# Patient Record
Sex: FEMALE | Race: BLACK OR AFRICAN AMERICAN | NOT HISPANIC OR LATINO | Employment: OTHER | URBAN - METROPOLITAN AREA
[De-identification: names, ages, dates, MRNs, and addresses within clinical notes are randomized per-mention and may not be internally consistent; named-entity substitution may affect disease eponyms.]

---

## 2017-06-27 PROCEDURE — 99283 EMERGENCY DEPT VISIT LOW MDM: CPT | Mod: ,,, | Performed by: EMERGENCY MEDICINE

## 2017-06-27 PROCEDURE — 99282 EMERGENCY DEPT VISIT SF MDM: CPT

## 2017-06-28 ENCOUNTER — HOSPITAL ENCOUNTER (EMERGENCY)
Facility: HOSPITAL | Age: 62
Discharge: HOME | End: 2017-06-28
Attending: EMERGENCY MEDICINE
Payer: MEDICARE

## 2017-06-28 VITALS
TEMPERATURE: 99 F | BODY MASS INDEX: 29.82 KG/M2 | SYSTOLIC BLOOD PRESSURE: 181 MMHG | HEIGHT: 67 IN | WEIGHT: 190 LBS | RESPIRATION RATE: 16 BRPM | DIASTOLIC BLOOD PRESSURE: 84 MMHG | HEART RATE: 88 BPM

## 2017-06-28 DIAGNOSIS — R51.9 NONINTRACTABLE EPISODIC HEADACHE, UNSPECIFIED HEADACHE TYPE: Primary | ICD-10-CM

## 2017-06-28 NOTE — ED PROVIDER NOTES
"Encounter Date: 6/27/2017    SCRIBE #1 NOTE: I, Bettina Payne, am scribing for, and in the presence of,  Dr. Avelar. I have scribed the following portions of the note - Other sections scribed: ANDREA VARGAS.       History     Chief Complaint   Patient presents with    Headache     Headache and nausea x 2 days     Time seen by provider: 1:06 AM    This is a 61 y.o. female with a hx of DM, HTN, stroke, MI x 2, and hypercholesteremia who presents with complaint of headache and nausea x 2 days. Patient states her head felt "swollen," but is now resolved. She did not take any medication for pain. She reports recording her sugar at time of symptom onset to be 205. She denies vomiting, dysuria, cold symptoms, sinus congestion, and unilateral weakness. She notes being out in the sun yesterday, but no over exertion.       The history is provided by the patient and medical records.     Review of patient's allergies indicates:   Allergen Reactions    Ampicillin     Phenergan [promethazine]      Past Medical History:   Diagnosis Date    Diabetes mellitus     Hypercholesteremia     Hypertension     Miscarriage     Myocardial infarct x2    Stroke      Past Surgical History:   Procedure Laterality Date    CARDIAC SURGERY      cardiac stent placement    CORONARY STENT PLACEMENT      TUBAL LIGATION       Family History   Problem Relation Age of Onset    Heart disease Mother     Early death Mother     Diabetes Maternal Aunt      Social History   Substance Use Topics    Smoking status: Current Every Day Smoker     Packs/day: 0.50     Years: 40.00    Smokeless tobacco: Not on file    Alcohol use Yes      Comment: occasionally     Review of Systems   Constitutional: Negative for fever.   HENT: Negative for congestion, sinus pressure and sore throat.    Respiratory: Negative for cough.    Gastrointestinal: Positive for nausea. Negative for abdominal pain, diarrhea and vomiting.   Genitourinary: Negative for dysuria and " hematuria.   Neurological: Positive for headaches. Negative for weakness.       Physical Exam     Initial Vitals [06/27/17 2359]   BP Pulse Resp Temp SpO2   (!) 181/84 88 16 98.7 °F (37.1 °C) --      MAP       116.33         Physical Exam    Constitutional: She appears well-developed and well-nourished. She is not diaphoretic. No distress.   HENT:   Head: Normocephalic and atraumatic.   Right Ear: External ear normal.   Left Ear: External ear normal.   Mouth/Throat: Oropharynx is clear and moist.   Eyes: Conjunctivae and EOM are normal. Pupils are equal, round, and reactive to light. Right eye exhibits no discharge. Left eye exhibits no discharge. No scleral icterus.   Neck: Normal range of motion. Neck supple. No JVD present.   No meningismus   Cardiovascular: Normal rate, regular rhythm and intact distal pulses. Exam reveals no gallop.    Pulmonary/Chest: Breath sounds normal. No stridor. No respiratory distress. She has no wheezes. She has no rhonchi. She has no rales.   Abdominal: Soft. Bowel sounds are normal. She exhibits no distension. There is no tenderness.   Musculoskeletal: Normal range of motion. She exhibits no edema.   Neurological: She is alert and oriented to person, place, and time. She has normal strength. No sensory deficit.   Skin: Skin is warm and dry. No rash noted. No erythema.   Psychiatric: She has a normal mood and affect.         ED Course   Procedures  Labs Reviewed - No data to display          Medical Decision Making:   History:   Old Medical Records: I decided to obtain old medical records.  Initial Assessment:   Resolved headache.  No neuro complaint, no fevers, no trauma.  C/w prior headaches.  ? Tension type ha, migraine.  Low suspicion for bleed, mass, infection or other life-threatening cause.  D/c home, return for new/worse sxs.  Pt indicates understanding.              Scribe Attestation:   Scribe #1: I performed the above scribed service and the documentation accurately  describes the services I performed. I attest to the accuracy of the note.    Attending Attestation:           Physician Attestation for Scribe:  Physician Attestation Statement for Scribe #1: I, Dr. Avelar, reviewed documentation, as scribed by Bettina Payne in my presence, and it is both accurate and complete.                 ED Course     Clinical Impression:   The encounter diagnosis was Nonintractable episodic headache, unspecified headache type.    Disposition:   Disposition: Discharged  Condition: Stable                        Campbell Avelar MD  06/30/17 6962

## 2018-02-16 ENCOUNTER — HOSPITAL ENCOUNTER (OUTPATIENT)
Facility: HOSPITAL | Age: 63
Discharge: HOME | End: 2018-02-17
Attending: EMERGENCY MEDICINE | Admitting: EMERGENCY MEDICINE
Payer: MEDICARE

## 2018-02-16 DIAGNOSIS — Z86.73 H/O: CVA (CEREBROVASCULAR ACCIDENT): Chronic | ICD-10-CM

## 2018-02-16 DIAGNOSIS — R06.02 SHORTNESS OF BREATH: ICD-10-CM

## 2018-02-16 DIAGNOSIS — I25.2 HISTORY OF MI (MYOCARDIAL INFARCTION): Chronic | ICD-10-CM

## 2018-02-16 DIAGNOSIS — R53.1 WEAKNESS: Primary | ICD-10-CM

## 2018-02-16 DIAGNOSIS — I10 HTN (HYPERTENSION): Chronic | ICD-10-CM

## 2018-02-16 LAB
ALBUMIN SERPL BCP-MCNC: 3.4 G/DL
ALP SERPL-CCNC: 148 U/L
ALT SERPL W/O P-5'-P-CCNC: 14 U/L
ANION GAP SERPL CALC-SCNC: 12 MMOL/L
AST SERPL-CCNC: 15 U/L
BASOPHILS # BLD AUTO: 0.1 K/UL
BASOPHILS NFR BLD: 0.6 %
BILIRUB SERPL-MCNC: 0.5 MG/DL
BILIRUB UR QL STRIP: NEGATIVE
BNP SERPL-MCNC: 38 PG/ML
BUN SERPL-MCNC: 19 MG/DL
CALCIUM SERPL-MCNC: 9.7 MG/DL
CHLORIDE SERPL-SCNC: 104 MMOL/L
CK SERPL-CCNC: 50 U/L
CLARITY UR REFRACT.AUTO: CLEAR
CO2 SERPL-SCNC: 22 MMOL/L
COLOR UR AUTO: YELLOW
CREAT SERPL-MCNC: 0.9 MG/DL
DIFFERENTIAL METHOD: ABNORMAL
EOSINOPHIL # BLD AUTO: 0.2 K/UL
EOSINOPHIL NFR BLD: 0.9 %
ERYTHROCYTE [DISTWIDTH] IN BLOOD BY AUTOMATED COUNT: 14.4 %
EST. GFR  (AFRICAN AMERICAN): >60 ML/MIN/1.73 M^2
EST. GFR  (NON AFRICAN AMERICAN): >60 ML/MIN/1.73 M^2
ESTIMATED AVG GLUCOSE: 174 MG/DL
FLUAV AG SPEC QL IA: NEGATIVE
FLUBV AG SPEC QL IA: NEGATIVE
GLUCOSE SERPL-MCNC: 129 MG/DL
GLUCOSE UR QL STRIP: NEGATIVE
HBA1C MFR BLD HPLC: 7.7 %
HCT VFR BLD AUTO: 46.8 %
HGB BLD-MCNC: 16 G/DL
HGB UR QL STRIP: NEGATIVE
IMM GRANULOCYTES # BLD AUTO: 0.09 K/UL
IMM GRANULOCYTES NFR BLD AUTO: 0.6 %
INR PPP: 2.6
KETONES UR QL STRIP: NEGATIVE
LEUKOCYTE ESTERASE UR QL STRIP: NEGATIVE
LYMPHOCYTES # BLD AUTO: 3.2 K/UL
LYMPHOCYTES NFR BLD: 19.8 %
MAGNESIUM SERPL-MCNC: 1.8 MG/DL
MCH RBC QN AUTO: 31.3 PG
MCHC RBC AUTO-ENTMCNC: 34.2 G/DL
MCV RBC AUTO: 91 FL
MONOCYTES # BLD AUTO: 0.9 K/UL
MONOCYTES NFR BLD: 5.5 %
NEUTROPHILS # BLD AUTO: 11.6 K/UL
NEUTROPHILS NFR BLD: 72.6 %
NITRITE UR QL STRIP: NEGATIVE
NRBC BLD-RTO: 0 /100 WBC
PH UR STRIP: 5 [PH] (ref 5–8)
PHOSPHATE SERPL-MCNC: 3.3 MG/DL
PLATELET # BLD AUTO: 249 K/UL
PMV BLD AUTO: 10.7 FL
POCT GLUCOSE: 131 MG/DL (ref 70–110)
POCT GLUCOSE: 162 MG/DL (ref 70–110)
POCT GLUCOSE: 174 MG/DL (ref 70–110)
POTASSIUM SERPL-SCNC: 3.8 MMOL/L
PROCALCITONIN SERPL IA-MCNC: <0.02 NG/ML
PROT SERPL-MCNC: 7.9 G/DL
PROT UR QL STRIP: NEGATIVE
PROTHROMBIN TIME: 25.5 SEC
RBC # BLD AUTO: 5.12 M/UL
SODIUM SERPL-SCNC: 138 MMOL/L
SP GR UR STRIP: 1.02 (ref 1–1.03)
SPECIMEN SOURCE: NORMAL
TROPONIN I SERPL DL<=0.01 NG/ML-MCNC: 0.01 NG/ML
TSH SERPL DL<=0.005 MIU/L-ACNC: 1.49 UIU/ML
URN SPEC COLLECT METH UR: NORMAL
UROBILINOGEN UR STRIP-ACNC: NEGATIVE EU/DL
VIT B12 SERPL-MCNC: 452 PG/ML
WBC # BLD AUTO: 15.97 K/UL

## 2018-02-16 PROCEDURE — 80053 COMPREHEN METABOLIC PANEL: CPT

## 2018-02-16 PROCEDURE — 83880 ASSAY OF NATRIURETIC PEPTIDE: CPT

## 2018-02-16 PROCEDURE — 82607 VITAMIN B-12: CPT

## 2018-02-16 PROCEDURE — 25000003 PHARM REV CODE 250: Performed by: STUDENT IN AN ORGANIZED HEALTH CARE EDUCATION/TRAINING PROGRAM

## 2018-02-16 PROCEDURE — 87400 INFLUENZA A/B EACH AG IA: CPT

## 2018-02-16 PROCEDURE — 85610 PROTHROMBIN TIME: CPT

## 2018-02-16 PROCEDURE — 63600175 PHARM REV CODE 636 W HCPCS: Performed by: STUDENT IN AN ORGANIZED HEALTH CARE EDUCATION/TRAINING PROGRAM

## 2018-02-16 PROCEDURE — 99284 EMERGENCY DEPT VISIT MOD MDM: CPT | Mod: 25

## 2018-02-16 PROCEDURE — 99285 EMERGENCY DEPT VISIT HI MDM: CPT | Mod: ,,, | Performed by: EMERGENCY MEDICINE

## 2018-02-16 PROCEDURE — 85025 COMPLETE CBC W/AUTO DIFF WBC: CPT

## 2018-02-16 PROCEDURE — 84100 ASSAY OF PHOSPHORUS: CPT

## 2018-02-16 PROCEDURE — 81003 URINALYSIS AUTO W/O SCOPE: CPT

## 2018-02-16 PROCEDURE — 83735 ASSAY OF MAGNESIUM: CPT

## 2018-02-16 PROCEDURE — 25000003 PHARM REV CODE 250: Performed by: HOSPITALIST

## 2018-02-16 PROCEDURE — 84443 ASSAY THYROID STIM HORMONE: CPT

## 2018-02-16 PROCEDURE — 96360 HYDRATION IV INFUSION INIT: CPT

## 2018-02-16 PROCEDURE — 84145 PROCALCITONIN (PCT): CPT

## 2018-02-16 PROCEDURE — 99220 PR INITIAL OBSERVATION CARE,LEVL III: CPT | Mod: GC,,, | Performed by: HOSPITALIST

## 2018-02-16 PROCEDURE — 82550 ASSAY OF CK (CPK): CPT

## 2018-02-16 PROCEDURE — G0378 HOSPITAL OBSERVATION PER HR: HCPCS

## 2018-02-16 PROCEDURE — 83036 HEMOGLOBIN GLYCOSYLATED A1C: CPT

## 2018-02-16 PROCEDURE — 84484 ASSAY OF TROPONIN QUANT: CPT

## 2018-02-16 RX ORDER — NITROGLYCERIN 0.4 MG/1
0.4 TABLET SUBLINGUAL EVERY 5 MIN PRN
Status: DISCONTINUED | OUTPATIENT
Start: 2018-02-16 | End: 2018-02-17 | Stop reason: HOSPADM

## 2018-02-16 RX ORDER — METOPROLOL SUCCINATE 50 MG/1
200 TABLET, EXTENDED RELEASE ORAL DAILY
Status: DISCONTINUED | OUTPATIENT
Start: 2018-02-16 | End: 2018-02-17 | Stop reason: HOSPADM

## 2018-02-16 RX ORDER — IBUPROFEN 200 MG
16 TABLET ORAL
Status: DISCONTINUED | OUTPATIENT
Start: 2018-02-16 | End: 2018-02-17 | Stop reason: HOSPADM

## 2018-02-16 RX ORDER — IBUPROFEN 200 MG
24 TABLET ORAL
Status: DISCONTINUED | OUTPATIENT
Start: 2018-02-16 | End: 2018-02-17 | Stop reason: HOSPADM

## 2018-02-16 RX ORDER — WARFARIN SODIUM 5 MG/1
5 TABLET ORAL
COMMUNITY

## 2018-02-16 RX ORDER — AMLODIPINE AND BENAZEPRIL HYDROCHLORIDE 5; 20 MG/1; MG/1
2 CAPSULE ORAL DAILY
Status: DISCONTINUED | OUTPATIENT
Start: 2018-02-16 | End: 2018-02-17 | Stop reason: HOSPADM

## 2018-02-16 RX ORDER — INSULIN ASPART 100 [IU]/ML
0-5 INJECTION, SOLUTION INTRAVENOUS; SUBCUTANEOUS
Status: DISCONTINUED | OUTPATIENT
Start: 2018-02-16 | End: 2018-02-17 | Stop reason: HOSPADM

## 2018-02-16 RX ORDER — ATORVASTATIN CALCIUM 20 MG/1
80 TABLET, FILM COATED ORAL DAILY
Status: DISCONTINUED | OUTPATIENT
Start: 2018-02-16 | End: 2018-02-17 | Stop reason: HOSPADM

## 2018-02-16 RX ORDER — IBUPROFEN 200 MG
1 TABLET ORAL DAILY
Status: DISCONTINUED | OUTPATIENT
Start: 2018-02-16 | End: 2018-02-17 | Stop reason: HOSPADM

## 2018-02-16 RX ORDER — SODIUM CHLORIDE 0.9 % (FLUSH) 0.9 %
5 SYRINGE (ML) INJECTION
Status: DISCONTINUED | OUTPATIENT
Start: 2018-02-16 | End: 2018-02-17 | Stop reason: HOSPADM

## 2018-02-16 RX ORDER — WARFARIN SODIUM 5 MG/1
5 TABLET ORAL
Status: DISCONTINUED | OUTPATIENT
Start: 2018-02-17 | End: 2018-02-17

## 2018-02-16 RX ORDER — ASPIRIN 81 MG/1
81 TABLET ORAL DAILY
Status: DISCONTINUED | OUTPATIENT
Start: 2018-02-16 | End: 2018-02-17 | Stop reason: HOSPADM

## 2018-02-16 RX ORDER — ONDANSETRON 4 MG/1
4 TABLET, ORALLY DISINTEGRATING ORAL EVERY 8 HOURS PRN
Status: DISCONTINUED | OUTPATIENT
Start: 2018-02-16 | End: 2018-02-17 | Stop reason: HOSPADM

## 2018-02-16 RX ORDER — SODIUM CHLORIDE 9 MG/ML
INJECTION, SOLUTION INTRAVENOUS ONCE
Status: COMPLETED | OUTPATIENT
Start: 2018-02-16 | End: 2018-02-16

## 2018-02-16 RX ORDER — GLUCAGON 1 MG
1 KIT INJECTION
Status: DISCONTINUED | OUTPATIENT
Start: 2018-02-16 | End: 2018-02-17 | Stop reason: HOSPADM

## 2018-02-16 RX ORDER — ACETAMINOPHEN 325 MG/1
650 TABLET ORAL EVERY 8 HOURS PRN
Status: DISCONTINUED | OUTPATIENT
Start: 2018-02-16 | End: 2018-02-17 | Stop reason: HOSPADM

## 2018-02-16 RX ORDER — CHOLECALCIFEROL (VITAMIN D3) 25 MCG
1000 TABLET ORAL DAILY
Status: DISCONTINUED | OUTPATIENT
Start: 2018-02-16 | End: 2018-02-17 | Stop reason: HOSPADM

## 2018-02-16 RX ORDER — INSULIN ASPART 100 [IU]/ML
6 INJECTION, SOLUTION INTRAVENOUS; SUBCUTANEOUS
Status: DISCONTINUED | OUTPATIENT
Start: 2018-02-16 | End: 2018-02-17 | Stop reason: HOSPADM

## 2018-02-16 RX ADMIN — SODIUM CHLORIDE: 0.9 INJECTION, SOLUTION INTRAVENOUS at 06:02

## 2018-02-16 RX ADMIN — VITAMIN D, TAB 1000IU (100/BT) 1000 UNITS: 25 TAB at 12:02

## 2018-02-16 RX ADMIN — SPIRONOLACTONE 25 MG: 25 TABLET, FILM COATED ORAL at 09:02

## 2018-02-16 RX ADMIN — WARFARIN SODIUM 7.5 MG: 2.5 TABLET ORAL at 05:02

## 2018-02-16 RX ADMIN — METOPROLOL SUCCINATE 200 MG: 50 TABLET, EXTENDED RELEASE ORAL at 09:02

## 2018-02-16 RX ADMIN — ATORVASTATIN CALCIUM 80 MG: 20 TABLET, FILM COATED ORAL at 12:02

## 2018-02-16 RX ADMIN — SODIUM CHLORIDE 250 ML: 0.9 INJECTION, SOLUTION INTRAVENOUS at 08:02

## 2018-02-16 RX ADMIN — INSULIN DETEMIR 18 UNITS: 100 INJECTION, SOLUTION SUBCUTANEOUS at 09:02

## 2018-02-16 RX ADMIN — ASPIRIN 81 MG: 81 TABLET, COATED ORAL at 12:02

## 2018-02-16 RX ADMIN — INSULIN ASPART 6 UNITS: 100 INJECTION, SOLUTION INTRAVENOUS; SUBCUTANEOUS at 03:02

## 2018-02-16 RX ADMIN — AMLODIPINE AND BENAZEPRIL HYDROCHLORIDE 2 CAPSULE: 5; 20 CAPSULE ORAL at 02:02

## 2018-02-16 NOTE — PLAN OF CARE
Problem: Patient Care Overview  Goal: Plan of Care Review  Outcome: Ongoing (interventions implemented as appropriate)  Pt's VSS, NAD noted. Pt denies pain at this time. Bed locked in lowest position, side rails upx2, call bell within reach. Nonskid socks present on pt.

## 2018-02-16 NOTE — ED PROVIDER NOTES
Encounter Date: 2/16/2018       History     Chief Complaint   Patient presents with    Fatigue     Pt states that she has been feeling weak and having body aches since Wednesday. Pt states she gets tired when she does a little activity.      Mrs. Mohan is a 59 y/o lady with PMHx STEMI s/p RCA PCI x 2, LV thrombus on coumadin, embolic CVA in 2013, DM, HTN, HLD, who presents today for dyspnea on exertion x 2 days. Patient endorses acute onset dyspnea and lower extremity weakness when walking up stairs in her home. She states she has never experienced this before, and her dyspnea is relived with rest. She states her usually her BG is low when she feels weak, however her fingstick glucose this AM was 115. She denies any recent illnesses, fevers, chills, CP, chest tightness, cough/hemoptysis, orthopnea, PND, n/v/d, hematochezia/melena.          Review of patient's allergies indicates:   Allergen Reactions    Ampicillin     Phenergan [promethazine]      Past Medical History:   Diagnosis Date    Diabetes mellitus     Hypercholesteremia     Hypertension     Miscarriage     Myocardial infarct x2    Stroke      Past Surgical History:   Procedure Laterality Date    CARDIAC SURGERY      cardiac stent placement    CORONARY STENT PLACEMENT      TUBAL LIGATION       Family History   Problem Relation Age of Onset    Heart disease Mother     Early death Mother     Diabetes Maternal Aunt      Social History   Substance Use Topics    Smoking status: Current Every Day Smoker     Packs/day: 0.50     Years: 40.00    Smokeless tobacco: Never Used    Alcohol use Yes      Comment: occasionally     Review of Systems   Constitutional: Negative for chills, diaphoresis and fever.   HENT: Negative for congestion and sore throat.    Eyes: Negative for photophobia and visual disturbance.   Respiratory: Positive for shortness of breath. Negative for cough, chest tightness and wheezing.    Cardiovascular: Negative for chest pain,  palpitations and leg swelling.   Gastrointestinal: Negative for abdominal pain, blood in stool, constipation, diarrhea, nausea and vomiting.   Genitourinary: Negative for dysuria and hematuria.   Musculoskeletal: Negative for arthralgias and myalgias.   Skin: Negative for color change and rash.   Neurological: Positive for weakness. Negative for dizziness, syncope, speech difficulty, light-headedness and headaches.       Physical Exam     Initial Vitals [02/16/18 0602]   BP Pulse Resp Temp SpO2   129/74 108 18 98.8 °F (37.1 °C) 98 %      MAP       92.33         Physical Exam    Vitals reviewed.  Constitutional: She appears well-developed and well-nourished. She is not diaphoretic. No distress.   HENT:   Head: Normocephalic and atraumatic.   Mouth/Throat: Oropharynx is clear and moist.   Eyes: Conjunctivae and EOM are normal. Pupils are equal, round, and reactive to light.   Neck: Neck supple. No JVD present.   Cardiovascular: Regular rhythm, normal heart sounds and intact distal pulses.   No murmur heard.  Tachycardic. 104 BPM   Pulmonary/Chest: Breath sounds normal. No respiratory distress. She has no wheezes. She has no rales.   Abdominal: Soft. Bowel sounds are normal. She exhibits no distension. There is no tenderness.   Musculoskeletal: Normal range of motion. She exhibits no edema.   Neurological: She is alert and oriented to person, place, and time. She has normal strength and normal reflexes.   Skin: Skin is warm and dry. Capillary refill takes less than 2 seconds. No erythema.         ED Course   Procedures  Labs Reviewed   CBC W/ AUTO DIFFERENTIAL - Abnormal; Notable for the following:        Result Value    WBC 15.97 (*)     MCH 31.3 (*)     Immature Granulocytes 0.6 (*)     Gran # (ANC) 11.6 (*)     Immature Grans (Abs) 0.09 (*)     All other components within normal limits   COMPREHENSIVE METABOLIC PANEL - Abnormal; Notable for the following:     CO2 22 (*)     Glucose 129 (*)     Albumin 3.4 (*)      Alkaline Phosphatase 148 (*)     All other components within normal limits   PROTIME-INR - Abnormal; Notable for the following:     Prothrombin Time 25.5 (*)     INR 2.6 (*)     All other components within normal limits   HEMOGLOBIN A1C - Abnormal; Notable for the following:     Hemoglobin A1C 7.7 (*)     Estimated Avg Glucose 174 (*)     All other components within normal limits    Narrative:     Add on order [id number] A1C. Per Dr. Vences  02/16/2018  11:51  add on mg tsh phos and cpk as per dr. vences  02/16/2018  12:27     B-TYPE NATRIURETIC PEPTIDE   TROPONIN I   INFLUENZA A AND B ANTIGEN   URINALYSIS, REFLEX TO URINE CULTURE    Narrative:     Preferred Collection Type->Urine, Clean Catch   PHOSPHORUS   MAGNESIUM   HEMOGLOBIN A1C   CK   TSH   MAGNESIUM    Narrative:     Add on order [id number] A1C. Per Dr. Vences  02/16/2018  11:51  add on mg tsh phos and cpk as per dr. vences  02/16/2018  12:27     PHOSPHORUS    Narrative:     Add on order [id number] A1C. Per Dr. Vences  02/16/2018  11:51  add on mg tsh phos and cpk as per dr. vences  02/16/2018  12:27     TSH    Narrative:     Add on order [id number] A1C. Per Dr. Vences  02/16/2018  11:51  add on mg tsh phos and cpk as per dr. vences  02/16/2018  12:27     CK    Narrative:     Add on order [id number] A1C. Per Dr. Vences  02/16/2018  11:51  add on mg tsh phos and cpk as per dr. vences  02/16/2018  12:27     PROCALCITONIN             Medical Decision Making:   Initial Assessment:   63 y/o F presents with dyspnea on exertion x 2 days.    Differential Diagnosis:   DDx includes but not limited to CHF, PNA, COPD, anemia, URI  Clinical Tests:   Lab Tests: Ordered  Radiological Study: Ordered  Medical Tests: Ordered              Attending Attestation:   Physician Attestation Statement for Resident:  As the supervising MD   Physician Attestation Statement: I have personally seen and examined this patient.   I agree with the above history. -: 63 yo f, extensive  "PMH, including DM, HTN, MI/CAD, CVA, LV thrombus on coumadin, here with multiple complaints, generalized weakness x 3-4 days, this am had left forearm discomfort and paresthesias with mild back pain that felt c/w previous MI.  Also feeling "wobbly" on her legs for several days but without focal weakness.  No HA, no bowel/bladder sx.  + episode nausea this am but none since.  On exam, VSS, pt well-appearing, no focal weakness, LE strength 5/5, gait normal.  EKG unchanged from prior.  Unclear etiology of sx - seem vague, multiple complaints - unclear if they are related?  Considered recurrent ACS given pt's hx.  Do not feel stroke an issue given lack of focal/unilateral sx.  Will likely admit to obs   As the supervising MD I agree with the above PE.    As the supervising MD I agree with the above treatment, course, plan, and disposition.  I have reviewed and agree with the residents interpretation of the following: lab data, x-rays and EKG.  I have reviewed the following: old records at this facility.                       Clinical Impression:   The primary encounter diagnosis was Weakness. A diagnosis of Shortness of breath was also pertinent to this visit.                           Zahraa Vences MD  02/17/18 [id number]    "

## 2018-02-16 NOTE — ASSESSMENT & PLAN NOTE
- Home medications:   Amlodipine- Benazepril 10-40 mg qd   Metoprolol succinate 200 mg qd   Spironolactone 25 mg qd  - Currently BP running ~125-150/ 60-80    Plan:  - Will resume all home medications

## 2018-02-16 NOTE — ED TRIAGE NOTES
Karen Mohan, a 62 y.o. female presents to the ED bed 19      Chief Complaint   Patient presents with    Fatigue     Pt states that she has been feeling weak and having body aches since Wednesday. Pt states she gets tired when she does a little activity.        +SOB with exertion. Denies chest pain, fevers/chills.     Review of patient's allergies indicates:   Allergen Reactions    Ampicillin     Phenergan [promethazine]      Past Medical History:   Diagnosis Date    Diabetes mellitus     Hypercholesteremia     Hypertension     Miscarriage     Myocardial infarct x2    Stroke

## 2018-02-16 NOTE — ASSESSMENT & PLAN NOTE
- Home medications:    Metformin 1000 mg q12   Glimepiride 4mg qd   Insulin glargine 36 units qHS (per patient, but not in MAR)  - Most recent HbA1C.    6/2018: 10.2  - Glucose level currently: 129    Plan:  - will obtain an updated HbA1C  - will hold oral medications  - will start her on Detemir 18 units qHS + Aspart 6 units TIDWM  - will also add LD SSI  - will monitor glucose levels closely and adjust insulin based on requirements

## 2018-02-16 NOTE — ASSESSMENT & PLAN NOTE
- Home medications:   Atorvastatin 80 mg qd  - Most recent lipid panel 2/2016:        LDL 55   HDL 22    Plan:  - Will continue home dose  - Will obtain updated lipid panel

## 2018-02-16 NOTE — ASSESSMENT & PLAN NOTE
Assessment:  - patient with non specific generalized fatigue, lightheadedness and weakness in the legs. Is compliant with all medications. No flu like symptoms, or change in GI or urinary habit  - WBC 15.9  - Hgb 16  - influenza negative  - u/a negative  - CXR within normal limits  - Based on findings so far, symptoms are less likely to be due hypoglycemia, anemia, dehydration, or influenza (however still possible). Other differential diagnosis are electrolyte disturbances, hypothyroidism, orthostatic hypotension, polypharmacy, arrhythmias, occult malignancies and lower on the list myositis, and fibromyalgia.     Plan:  - will obtain orthostatic vital signs  - will obtain TSH, B12, Ck, Mg, Phos  - will keep on cardiac monitoring

## 2018-02-16 NOTE — HPI
The patient is a 63 y/o F with HTN, Dm, HLD, and CAD s/p RCA PCI and stenting x2, cardioembolic CVA due to LV thrombus (on warfarin) with no residual FND,who presented to the ED with generalized fatigue and weakness in the lower extremities.    Patient started to feel not being like her usual 3-4 days ago, she describes it as feeling wobbly and week in her legs, associated with lightheadedness when walking, feeling that she is about to pass out. She mentions palpitation and mild shortness of breath, pain in her left forearm and upper back and nausea as well. She denies headache, dizziness, falling to one side, difficulty in speech, chest pain/tightness, diaphoresis, or any focal weakness or paresthesia. She denies any other GI or urinary symptoms, she has had enough PO intake in the past few days. She hasn't lost any weight recently.

## 2018-02-16 NOTE — SUBJECTIVE & OBJECTIVE
Past Medical History:   Diagnosis Date    Diabetes mellitus     Hypercholesteremia     Hypertension     Miscarriage     Myocardial infarct x2    Stroke        Past Surgical History:   Procedure Laterality Date    CARDIAC SURGERY      cardiac stent placement    CORONARY STENT PLACEMENT      TUBAL LIGATION         Review of patient's allergies indicates:   Allergen Reactions    Ampicillin     Phenergan [promethazine]        No current facility-administered medications on file prior to encounter.      Current Outpatient Prescriptions on File Prior to Encounter   Medication Sig    amlodipine-benazepril (LOTREL) 10-40 mg per capsule Take 1 capsule by mouth once daily.    aspirin (ECOTRIN) 81 MG EC tablet Take 81 mg by mouth once daily.    atorvastatin (LIPITOR) 80 MG tablet Take 1 tablet (80 mg total) by mouth once daily.    blood pressure test kit-large Kit Use as directed    glimepiride (AMARYL) 4 MG tablet Take 4 mg by mouth before breakfast.      metformin (GLUCOPHAGE) 1000 MG tablet Take 1,000 mg by mouth 2 (two) times daily with meals.    metoprolol succinate (TOPROL-XL) 200 MG 24 hr tablet Take 200 mg by mouth once daily.    nitroGLYCERIN (NITROSTAT) 0.4 MG SL tablet Place 1 tablet (0.4 mg total) under the tongue every 5 (five) minutes as needed for Chest pain (Do not take more than three doses at one time. If having chest pain, report to nearest emergency room.).    spironolactone (ALDACTONE) 25 MG tablet Take 25 mg by mouth once daily.    vitamin D 1000 units Tab Take 185 mg by mouth once daily.    warfarin (COUMADIN) 7.5 MG tablet Take 7.5 mg by mouth once daily.     Family History     Problem Relation (Age of Onset)    Diabetes Maternal Aunt    Early death Mother    Heart disease Mother        Social History Main Topics    Smoking status: Current Every Day Smoker     Packs/day: 0.50     Years: 40.00    Smokeless tobacco: Never Used    Alcohol use Yes      Comment: occasionally    Drug  use: No    Sexual activity: Yes     Partners: Male     Birth control/ protection: None     Review of Systems   Constitutional: Positive for activity change and fatigue. Negative for appetite change, chills, fever and unexpected weight change.   HENT: Negative for congestion, ear pain (but feels swollen), rhinorrhea, sneezing, sore throat, tinnitus, trouble swallowing and voice change.         Feels her head is swollen   Eyes: Negative for visual disturbance.   Respiratory: Negative for cough, chest tightness, shortness of breath and wheezing.    Cardiovascular: Positive for palpitations. Negative for chest pain and leg swelling.   Gastrointestinal: Positive for nausea. Negative for abdominal pain, blood in stool, diarrhea and vomiting.   Genitourinary: Negative for dysuria, frequency, hematuria and urgency.   Musculoskeletal: Positive for back pain (upper mid chest). Negative for arthralgias and myalgias.   Skin: Negative for pallor and rash.   Neurological: Positive for weakness and light-headedness. Negative for dizziness, syncope (few episodes of near syncope), speech difficulty, numbness and headaches.   Psychiatric/Behavioral: Negative for agitation and confusion. The patient is nervous/anxious.      Objective:     Vital Signs (Most Recent):  Temp: 98.8 °F (37.1 °C) (02/16/18 0602)  Pulse: 95 (02/16/18 1102)  Resp: 19 (02/16/18 1102)  BP: 130/61 (02/16/18 1102)  SpO2: 97 % (02/16/18 1102) Vital Signs (24h Range):  Temp:  [98.8 °F (37.1 °C)] 98.8 °F (37.1 °C)  Pulse:  [] 95  Resp:  [18-21] 19  SpO2:  [95 %-100 %] 97 %  BP: (123-155)/(60-83) 130/61     Weight: 82.6 kg (182 lb)  Body mass index is 28.51 kg/m².    Physical Exam   Constitutional: She is oriented to person, place, and time. She appears well-developed and well-nourished. She is cooperative. She does not appear ill. No distress.   HENT:   Head: Normocephalic and atraumatic.   Right Ear: Tympanic membrane normal. No drainage or swelling. Tympanic  membrane is not bulging. No middle ear effusion.   Left Ear: Tympanic membrane normal. No drainage or swelling. Tympanic membrane is not bulging.  No middle ear effusion.   Mouth/Throat: Mucous membranes are normal. Mucous membranes are not dry.   Eyes: Conjunctivae and EOM are normal. Pupils are equal, round, and reactive to light. No scleral icterus.   Neck: Normal range of motion. Neck supple. No JVD present. No thyroid mass and no thyromegaly present.   Cardiovascular: Regular rhythm and normal heart sounds.  Tachycardia present.  Exam reveals no gallop.    No murmur heard.  Pulses:       Radial pulses are 2+ on the right side, and 2+ on the left side.        Dorsalis pedis pulses are 2+ on the right side, and 2+ on the left side.   Pulmonary/Chest: Effort normal and breath sounds normal. No accessory muscle usage. No tachypnea. No respiratory distress. She has no decreased breath sounds. She has no wheezes. She has no rales.   Abdominal: Soft. Bowel sounds are normal. She exhibits no distension. There is no tenderness. There is no guarding.   Musculoskeletal: She exhibits no edema or tenderness.   Lymphadenopathy:        Head (right side): No submandibular adenopathy present.        Head (left side): No submandibular adenopathy present.     She has no cervical adenopathy.   Neurological: She is alert and oriented to person, place, and time. She has normal strength. She displays no tremor. No sensory deficit. She displays a negative Romberg sign. Coordination normal. GCS eye subscore is 4. GCS verbal subscore is 5. GCS motor subscore is 6.   Skin: Skin is warm and dry. She is not diaphoretic. No cyanosis or erythema. No pallor.   Psychiatric: Her speech is normal and behavior is normal. Thought content normal. Her mood appears anxious (tearful). Cognition and memory are normal.   Nursing note and vitals reviewed.        CRANIAL NERVES     CN III, IV, VI   Pupils are equal, round, and reactive to  light.  Extraocular motions are normal.   Nystagmus: none     CN V   Facial sensation intact.     CN VII   Facial expression full, symmetric.     CN VIII   CN VIII normal.     CN IX, X   CN IX normal.   CN X normal.     CN XI   CN XI normal.     CN XII   CN XII normal.        Significant Labs:   CBC:   Recent Labs  Lab 02/16/18  0655   WBC 15.97*   HGB 16.0   HCT 46.8        CMP:   Recent Labs  Lab 02/16/18  0655      K 3.8      CO2 22*   *   BUN 19   CREATININE 0.9   CALCIUM 9.7   PROT 7.9   ALBUMIN 3.4*   BILITOT 0.5   ALKPHOS 148*   AST 15   ALT 14   ANIONGAP 12   EGFRNONAA >60.0     Cardiac Markers:   Recent Labs  Lab 02/16/18  0655   BNP 38     Coagulation:   Recent Labs  Lab 02/16/18  0655   INR 2.6*     Troponin:   Recent Labs  Lab 02/16/18  0655   TROPONINI 0.011     Urine Studies:   Recent Labs  Lab 02/16/18  0902   COLORU Yellow   APPEARANCEUA Clear   PHUR 5.0   SPECGRAV 1.020   PROTEINUA Negative   GLUCUA Negative   KETONESU Negative   BILIRUBINUA Negative   OCCULTUA Negative   NITRITE Negative   UROBILINOGEN Negative   LEUKOCYTESUR Negative       Significant Imaging: CXR: I have reviewed all pertinent results/findings within the past 24 hours and my personal findings are:  no evidence of pulmonary edema, pleural effusion or any consolidations  EKG: I have reviewed all pertinent results/findings within the past 24 hours and my personal findings are: sinus tachyacardia, no ischemic changes

## 2018-02-16 NOTE — H&P
[facility] Hospital Medicine  History & Physical    Patient Name: Karen Mohan  MRN: [id number]  Admission Date: 2/16/2018  Attending Physician: Zahraa Vences MD   Primary Care Provider: Ghazala Valencia MD    [facility] Medicine Team: [facility] MED 2 Asia Monterroso MD     Patient information was obtained from patient and ER records.     Subjective:     Principal Problem:<principal problem not specified>    Chief Complaint:   Chief Complaint   Patient presents with    Fatigue     Pt states that she has been feeling weak and having body aches since Wednesday. Pt states she gets tired when she does a little activity.         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    Patient started to feel not being like her usual 3-4 days ago, she describes it as feeling wobbly and week in her legs, associated with lightheadedness when walking, feeling that she is about to pass out. She mentions palpitation and mild shortness of breath, pain in her left forearm and upper back and nausea as well. She denies headache, dizziness, falling to one side, difficulty in speech, chest pain/tightness, diaphoresis, or any focal weakness or paresthesia. She denies any other GI or urinary symptoms, she has had enough PO intake in the past few days. She hasn't lost any weight recently.    Upon presentation to the ED she was tachycardic~110, O2 sat WNL, and no significant finding in the exam. Labs were significant for WBC 15k, upper limit normal Hgb, and normal Troponin I, BNP, u/a. ECG with no acute ischemic changes and CXR normal. Patient is getting admitted to the hospital medicine for further work up of the fatigue and weakness and observation.    Past Medical History:   Diagnosis Date    Diabetes mellitus      Hypercholesteremia     Hypertension     Miscarriage     Myocardial infarct x2    Stroke        Past Surgical History:   Procedure Laterality Date    CARDIAC SURGERY      cardiac stent placement    CORONARY STENT PLACEMENT      TUBAL LIGATION         Review of patient's allergies indicates:   Allergen Reactions    Ampicillin     Phenergan [promethazine]        No current facility-administered medications on file prior to encounter.      Current Outpatient Prescriptions on File Prior to Encounter   Medication Sig    amlodipine-benazepril (LOTREL) 10-40 mg per capsule Take 1 capsule by mouth once daily.    aspirin (ECOTRIN) 81 MG EC tablet Take 81 mg by mouth once daily.    atorvastatin (LIPITOR) 80 MG tablet Take 1 tablet (80 mg total) by mouth once daily.    blood pressure test kit-large Kit Use as directed    glimepiride (AMARYL) 4 MG tablet Take 4 mg by mouth before breakfast.      metformin (GLUCOPHAGE) 1000 MG tablet Take 1,000 mg by mouth 2 (two) times daily with meals.    metoprolol succinate (TOPROL-XL) 200 MG 24 hr tablet Take 200 mg by mouth once daily.    nitroGLYCERIN (NITROSTAT) 0.4 MG SL tablet Place 1 tablet (0.4 mg total) under the tongue every 5 (five) minutes as needed for Chest pain (Do not take more than three doses at one time. If having chest pain, report to nearest emergency room.).    spironolactone (ALDACTONE) 25 MG tablet Take 25 mg by mouth once daily.    vitamin D 1000 units Tab Take 185 mg by mouth once daily.    warfarin (COUMADIN) 7.5 MG tablet Take 7.5 mg by mouth once daily.     Family History     Problem Relation (Age of Onset)    Diabetes Maternal Aunt    Early death Mother    Heart disease Mother        Social History Main Topics    Smoking status: Current Every Day Smoker     Packs/day: 0.50     Years: 40.00    Smokeless tobacco: Never Used    Alcohol use Yes      Comment: occasionally    Drug use: No    Sexual activity: Yes     Partners: Male     Birth  control/ protection: None     Review of Systems   Constitutional: Positive for activity change and fatigue. Negative for appetite change, chills, fever and unexpected weight change.   HENT: Negative for congestion, ear pain (but feels swollen), rhinorrhea, sneezing, sore throat, tinnitus, trouble swallowing and voice change.         Feels her head is swollen   Eyes: Negative for visual disturbance.   Respiratory: Negative for cough, chest tightness, shortness of breath and wheezing.    Cardiovascular: Positive for palpitations. Negative for chest pain and leg swelling.   Gastrointestinal: Positive for nausea. Negative for abdominal pain, blood in stool, diarrhea and vomiting.   Genitourinary: Negative for dysuria, frequency, hematuria and urgency.   Musculoskeletal: Positive for back pain (upper mid chest). Negative for arthralgias and myalgias.   Skin: Negative for pallor and rash.   Neurological: Positive for weakness and light-headedness. Negative for dizziness, syncope (few episodes of near syncope), speech difficulty, numbness and headaches.   Psychiatric/Behavioral: Negative for agitation and confusion. The patient is nervous/anxious.      Objective:     Vital Signs (Most Recent):  Temp: 98.8 °F (37.1 °C) (02/16/18 0602)  Pulse: 95 (02/16/18 1102)  Resp: 19 (02/16/18 1102)  BP: 130/61 (02/16/18 1102)  SpO2: 97 % (02/16/18 1102) Vital Signs (24h Range):  Temp:  [98.8 °F (37.1 °C)] 98.8 °F (37.1 °C)  Pulse:  [] 95  Resp:  [18-21] 19  SpO2:  [95 %-100 %] 97 %  BP: (123-155)/(60-83) 130/61     Weight: 82.6 kg (182 lb)  Body mass index is 28.51 kg/m².    Physical Exam   Constitutional: She is oriented to person, place, and time. She appears well-developed and well-nourished. She is cooperative. She does not appear ill. No distress.   HENT:   Head: Normocephalic and atraumatic.   Mouth/Throat: Mucous membranes are normal. Mucous membranes are not dry.   Eyes: Conjunctivae and EOM are normal. Pupils are equal,  round, and reactive to light. No scleral icterus.   Neck: Normal range of motion. Neck supple. No JVD present. No thyroid mass and no thyromegaly present.   Cardiovascular: Regular rhythm and normal heart sounds.  Tachycardia present.  Exam reveals no gallop.    No murmur heard.  Pulses:       Radial pulses are 2+ on the right side, and 2+ on the left side.        Dorsalis pedis pulses are 2+ on the right side, and 2+ on the left side.   Pulmonary/Chest: Effort normal and breath sounds normal. No accessory muscle usage. No tachypnea. No respiratory distress. She has no decreased breath sounds. She has no wheezes. She has no rales.   Abdominal: Soft. Bowel sounds are normal. She exhibits no distension. There is no tenderness. There is no guarding.   Musculoskeletal: She exhibits no edema or tenderness.   Lymphadenopathy:        Head (right side): No submandibular adenopathy present.        Head (left side): No submandibular adenopathy present.     She has no cervical adenopathy.   Skin: Skin is warm and dry. She is not diaphoretic. No cyanosis or erythema. No pallor.   Psychiatric: Her speech is normal and behavior is normal. Thought content normal. Her mood appears anxious (tearful). Cognition and memory are normal.   Nursing note and vitals reviewed.    Neurological: She is alert and oriented to person, place, and time. She has normal strength. She displays no tremor. No sensory deficit. She displays a negative Romberg sign. Coordination normal. GCS eye subscore is 4. GCS verbal subscore is 5. GCS motor subscore is 6.   CN III, IV, VI   Pupils are equal, round, and reactive to light.  Extraocular motions are normal.   Nystagmus: none   CN V   Facial sensation intact.   CN VII   Facial expression full, symmetric.   CN VIII   CN VIII normal.   CN IX, X   CN IX normal.   CN X normal.   CN XI   CN XI normal.   CN XII   CN XII normal.        Significant Labs:   CBC:   Recent Labs  Lab 02/16/18  0655   WBC 15.97*   HGB  16.0   HCT 46.8        CMP:   Recent Labs  Lab 02/16/18  0655      K 3.8      CO2 22*   *   BUN 19   CREATININE 0.9   CALCIUM 9.7   PROT 7.9   ALBUMIN 3.4*   BILITOT 0.5   ALKPHOS 148*   AST 15   ALT 14   ANIONGAP 12   EGFRNONAA >60.0     Cardiac Markers:   Recent Labs  Lab 02/16/18  0655   BNP 38     Coagulation:   Recent Labs  Lab 02/16/18  0655   INR 2.6*     Troponin:   Recent Labs  Lab 02/16/18  0655   TROPONINI 0.011     Urine Studies:   Recent Labs  Lab 02/16/18  0902   COLORU Yellow   APPEARANCEUA Clear   PHUR 5.0   SPECGRAV 1.020   PROTEINUA Negative   GLUCUA Negative   KETONESU Negative   BILIRUBINUA Negative   OCCULTUA Negative   NITRITE Negative   UROBILINOGEN Negative   LEUKOCYTESUR Negative       Significant Imaging:   CXR: I have reviewed all pertinent results/findings within the past 24 hours and my personal findings are:  no evidence of pulmonary edema, pleural effusion or any consolidations  EKG: I have reviewed all pertinent results/findings within the past 24 hours and my personal findings are: sinus tachyacardia, no ischemic changes    Assessment/Plan:     Weakness    Assessment:  - patient with non specific generalized fatigue, lightheadedness and weakness in the legs. Is compliant with all medications. No flu like symptoms, or change in GI or urinary habit  - WBC 15.9  - Hgb 16  - influenza negative  - u/a negative  - CXR within normal limits  - Based on findings so far, symptoms are less likely to be due hypoglycemia, anemia, dehydration, or influenza (however still possible). Other differential diagnosis are electrolyte disturbances, hypothyroidism, orthostatic hypotension, polypharmacy, arrhythmias, occult malignancies and lower on the list myositis, and fibromyalgia.     Plan:  - will obtain orthostatic vital signs  - will obtain TSH, B12, Ck, Mg, Phos  - will keep on cardiac monitoring        History of MI (myocardial infarction)    Assessment:   - Hx of STEMI x2  (2000 + 2012) s/p RCA PCI and stenting x2  - multiple risk factors: HTN, HLD, current smoker 20 PPD  - On Aspirin 81 mg qd + Atorvastatin 80 mg qd  - Most recent 2D echo 2/2016:     1 - Concentric remodeling.      2 - EF 45-50% with akinesis of the apex.      3 - Normal right ventricular systolic function .     4 - Left ventricular diastolic dysfunction.      5 - There is a small, partially calcified (old) thrombus in the cardiac apex.   - came in not feeling well, SOB, palpitation, lightheadedness, and pain in the L forearm and back  - initial ACS work up in the ED:   Troponin: 0.011   BNP: 38   ECG: sinus tachycardia, no ischemic changes   CXR: WNL  - more likely to be due to transient arrhythmia or medication mismanagement (patient mentions ran out of metoprolol a few days ago) rather than ACS, also patient very anxious about her health that is also contributing to her symptoms.    Plan:  - will keep on cardiac monitoring  - might benefit a cardiac event monitoring upon discharge if work up in the hospital inconclusive  - patient already anticoagulated in any case, INR within therapeutic levels  - will order nicotine patch qd  - will provide counseling for smoking cessation         Diabetes mellitus, type 2    - Home medications:    Metformin 1000 mg q12   Glimepiride 4mg qd   Insulin glargine 36 units qHS (per patient, but not in MAR)  - Most recent HbA1C.    6/2018: 10.2  - Glucose level currently: 129    Plan:  - will obtain an updated HbA1C  - will hold oral medications  - will start her on Detemir 18 units qHS + Aspart 6 units TIDWM  - will also add LD SSI  - will monitor glucose levels closely and adjust insulin based on requirements        Hyperlipidemia    - Home medications:   Atorvastatin 80 mg qd  - Most recent lipid panel 2/2016:        LDL 55   HDL 22    Plan:  - Will continue home dose  - Will obtain updated lipid panel        HTN (hypertension)    - Home medications:   Amlodipine- Benazepril  10-40 mg qd   Metoprolol succinate 200 mg qd   Spironolactone 25 mg qd  - Currently BP running ~125-150/ 60-80    Plan:  - Will resume all home medications          VTE Risk Mitigation         Ordered     warfarin (COUMADIN) tablet 5 mg  Every Tues, Thurs, Sat     Route:  Oral        02/16/18 1144     warfarin tablet 7.5 mg  Every Mon, Wed, Fri     Route:  Oral        02/16/18 1144     Reason for no Mechanical VTE Prophylaxis  Once      02/16/18 1109     High Risk of VTE  Once      02/16/18 1109        Asia Monterroso MD  Department of Hospital Medicine   [facility]

## 2018-02-16 NOTE — ASSESSMENT & PLAN NOTE
Assessment:   - Hx of STEMI x2 (2000 + 2012) s/p RCA PCI and stenting x2  - multiple risk factors: HTN, HLD, current smoker 20 PPD  - On Aspirin 81 mg qd + Atorvastatin 80 mg qd  - Most recent 2D echo 2/2016:     1 - Concentric remodeling.      2 - EF 45-50% with akinesis of the apex.      3 - Normal right ventricular systolic function .     4 - Left ventricular diastolic dysfunction.      5 - There is a small, partially calcified (old) thrombus in the cardiac apex.   - came in not feeling well, SOB, palpitation, lightheadedness, and pain in the L forearm and back  - initial ACS work up in the ED:   Troponin: 0.011   BNP: 38   ECG: sinus tachycardia, no ischemic changes   CXR: WNL  - more likely to be due to transient arrhythmia or medication mismanagement (patient mentions ran out of metoprolol a few days ago) rather than ACS, also patient very anxious about her health that is also contributing to her symptoms.    Plan:  - will keep on cardiac monitoring  - might benefit a cardiac event monitoring upon discharge if work up in the hospital inconclusive  - patient already anticoagulated in any case, INR within therapeutic levels  - will order nicotine patch qd  - will provide counseling for smoking cessation

## 2018-02-17 VITALS
BODY MASS INDEX: 27.25 KG/M2 | HEART RATE: 77 BPM | HEIGHT: 67 IN | RESPIRATION RATE: 17 BRPM | DIASTOLIC BLOOD PRESSURE: 73 MMHG | TEMPERATURE: 98 F | WEIGHT: 173.63 LBS | SYSTOLIC BLOOD PRESSURE: 117 MMHG | OXYGEN SATURATION: 93 %

## 2018-02-17 LAB
ANION GAP SERPL CALC-SCNC: 9 MMOL/L
BASOPHILS # BLD AUTO: 0.11 K/UL
BASOPHILS NFR BLD: 0.9 %
BUN SERPL-MCNC: 14 MG/DL
CALCIUM SERPL-MCNC: 9.5 MG/DL
CHLORIDE SERPL-SCNC: 105 MMOL/L
CO2 SERPL-SCNC: 24 MMOL/L
CREAT SERPL-MCNC: 0.8 MG/DL
DIFFERENTIAL METHOD: ABNORMAL
EOSINOPHIL # BLD AUTO: 0.1 K/UL
EOSINOPHIL NFR BLD: 1.1 %
ERYTHROCYTE [DISTWIDTH] IN BLOOD BY AUTOMATED COUNT: 14.4 %
EST. GFR  (AFRICAN AMERICAN): >60 ML/MIN/1.73 M^2
EST. GFR  (NON AFRICAN AMERICAN): >60 ML/MIN/1.73 M^2
GLUCOSE SERPL-MCNC: 77 MG/DL
HCT VFR BLD AUTO: 45.9 %
HGB BLD-MCNC: 15.5 G/DL
IMM GRANULOCYTES # BLD AUTO: 0.07 K/UL
IMM GRANULOCYTES NFR BLD AUTO: 0.6 %
INR PPP: 3.8
LYMPHOCYTES # BLD AUTO: 3.4 K/UL
LYMPHOCYTES NFR BLD: 27.4 %
MCH RBC QN AUTO: 31 PG
MCHC RBC AUTO-ENTMCNC: 33.8 G/DL
MCV RBC AUTO: 92 FL
MONOCYTES # BLD AUTO: 0.7 K/UL
MONOCYTES NFR BLD: 6 %
NEUTROPHILS # BLD AUTO: 7.8 K/UL
NEUTROPHILS NFR BLD: 64 %
NRBC BLD-RTO: 0 /100 WBC
PLATELET # BLD AUTO: 235 K/UL
PMV BLD AUTO: 10.4 FL
POCT GLUCOSE: 107 MG/DL (ref 70–110)
POTASSIUM SERPL-SCNC: 4 MMOL/L
PROTHROMBIN TIME: 37.3 SEC
RBC # BLD AUTO: 5 M/UL
SODIUM SERPL-SCNC: 138 MMOL/L
WBC # BLD AUTO: 12.22 K/UL

## 2018-02-17 PROCEDURE — 97161 PT EVAL LOW COMPLEX 20 MIN: CPT

## 2018-02-17 PROCEDURE — G8980 MOBILITY D/C STATUS: HCPCS | Mod: CH

## 2018-02-17 PROCEDURE — G8987 SELF CARE CURRENT STATUS: HCPCS | Mod: CH

## 2018-02-17 PROCEDURE — 97165 OT EVAL LOW COMPLEX 30 MIN: CPT

## 2018-02-17 PROCEDURE — G8979 MOBILITY GOAL STATUS: HCPCS | Mod: CH

## 2018-02-17 PROCEDURE — G8978 MOBILITY CURRENT STATUS: HCPCS | Mod: CH

## 2018-02-17 PROCEDURE — 85025 COMPLETE CBC W/AUTO DIFF WBC: CPT

## 2018-02-17 PROCEDURE — 85610 PROTHROMBIN TIME: CPT

## 2018-02-17 PROCEDURE — 25000003 PHARM REV CODE 250: Performed by: STUDENT IN AN ORGANIZED HEALTH CARE EDUCATION/TRAINING PROGRAM

## 2018-02-17 PROCEDURE — G8988 SELF CARE GOAL STATUS: HCPCS | Mod: CH

## 2018-02-17 PROCEDURE — 99217 PR OBSERVATION CARE DISCHARGE: CPT | Mod: GC,,, | Performed by: HOSPITALIST

## 2018-02-17 PROCEDURE — G8989 SELF CARE D/C STATUS: HCPCS | Mod: CH

## 2018-02-17 PROCEDURE — G0378 HOSPITAL OBSERVATION PER HR: HCPCS

## 2018-02-17 PROCEDURE — 36415 COLL VENOUS BLD VENIPUNCTURE: CPT

## 2018-02-17 PROCEDURE — 80048 BASIC METABOLIC PNL TOTAL CA: CPT

## 2018-02-17 RX ORDER — INSULIN GLARGINE 100 [IU]/ML
36 INJECTION, SOLUTION SUBCUTANEOUS NIGHTLY
COMMUNITY

## 2018-02-17 RX ADMIN — ASPIRIN 81 MG: 81 TABLET, COATED ORAL at 08:02

## 2018-02-17 RX ADMIN — ATORVASTATIN CALCIUM 80 MG: 20 TABLET, FILM COATED ORAL at 08:02

## 2018-02-17 RX ADMIN — VITAMIN D, TAB 1000IU (100/BT) 1000 UNITS: 25 TAB at 08:02

## 2018-02-17 RX ADMIN — SODIUM CHLORIDE 500 ML: 0.9 INJECTION, SOLUTION INTRAVENOUS at 10:02

## 2018-02-17 NOTE — PT/OT/SLP EVAL
"Occupational Therapy   Evaluation and Discharge Note    Name: Karen Mohan  MRN: [id number]  Admitting Diagnosis:  Weakness      Recommendations:     Discharge Recommendations: home  Discharge Equipment Recommendations:  none  Barriers to discharge:  None    History:     Occupational Profile:  Living Environment: lives in 2 story home, rail on one side, bedroom upstairs  Previous level of function: Independent  Roles and Routines: none stated  Equipment Owned:  grab bar  Assistance upon Discharge: yes    Past Medical History:   Diagnosis Date    Diabetes mellitus     Hypercholesteremia     Hypertension     Miscarriage     Myocardial infarct x2    Stroke        Past Surgical History:   Procedure Laterality Date    CARDIAC SURGERY      cardiac stent placement    CORONARY STENT PLACEMENT      TUBAL LIGATION         Subjective     Chief Complaint: wants to go home  Patient/Family stated goals: go home  Communicated with: temo prior to session.  Pain/Comfort:  · Pain Rating 1: 0/10    Patients cultural, spiritual, Jehovah's witness conflicts given the current situation: none    Objective:     Patient found with: peripheral IV    General Precautions: Standard,     Orthopedic Precautions:    Braces:       Occupational Performance:    Bed Mobility:    · Mod I     Functional Mobility/Transfers:  · Ambulated x 150 ft with independence, no AD  · Activities of Daily Living:  · independent    Cognitive/Visual Perceptual:  intact    Physical Exam:  UE ROM intact, 5/5 strength    Patient left supine with all lines intact and call button in reach    AMPAC 6 Click:  AMPAC Total Score: 24    Treatment & Education:  Plan of care, role of Ot, safety  Education:    Assessment:     Karen Mohan is a 62 y.o. female with a medical diagnosis of Weakness. At this time, patient is functioning at their prior level of function and does not require further acute OT services.     Clinical Decision Makin.  OT Low:  "Pt evaluation falls " "under low complexity for evaluation coding due to performance deficits noted in 1-3 areas as stated above and 0 co-morbities affecting current functional status. Data obtained from problem focused assessments. No modifications or assistance was required for completion of evaluation. Only brief occupational profile and history review completed."     Plan:     During this hospitalization, patient does not require further acute OT services.  Please re-consult if situation changes.    · Plan of Care Reviewed with: patient    This Plan of care has been discussed with the patient who was involved in its development and understands and is in agreement with the identified goals and treatment plan    GOALS:    Occupational Therapy Goals     Not on file                Time Tracking:     OT Date of Treatment: 02/17/18  OT Start Time: 0923  OT Stop Time: 0932  OT Total Time (min): 9 min    Billable Minutes:Evaluation 9 min    Mary Lou Martinez OT  2/17/2018    "

## 2018-02-17 NOTE — PROGRESS NOTES
Pt discharged and ambulated off unit with spouse. VSS, NAD noted. IV removed with catheter intact. Discharge instructions reviewed and pt verbalized understanding.

## 2018-02-17 NOTE — PROGRESS NOTES
Dr. Monterroso from [facility] aware that pt's BP is 104/62 with a HR of 72 and is asymptomatic. Pt stated she did feel lightheaded as she ambulated to the bathroom with the night nurse this AM. Pt denies feeling lightheaded or dizzy at this time. Per Dr. Monterroso, hold morning Amlodipine, Metoprolol, and Spirnolactone medications unless SBP is above 120.

## 2018-02-17 NOTE — PLAN OF CARE
Problem: Patient Care Overview  Goal: Plan of Care Review  Outcome: Ongoing (interventions implemented as appropriate)  Plan of care reviewed with patient. No distress noted at this time. IV fluids infusing as ordered. Cardiac monitoring maintained. Blood glucose monitoring in place. Pt free from falls. Will continue to monitor closely.

## 2018-02-17 NOTE — PT/OT/SLP EVAL
"Physical Therapy Evaluation and Discharge Note    Patient Name:  Karen Mohan   MRN:  [id number]    Recommendations:     Discharge Recommendations:  home   Discharge Equipment Recommendations: none   Barriers to discharge: None (Pt has stairs to bedroom and bathroom but demo'd ability to ascend/descend stairs without difficulty during PT eval.)    Assessment:     Karen Mohan is a 62 y.o. female admitted with a medical diagnosis of Weakness. Pt able to perform functional mobility without physical assist or use of DME. Performed ambulation and stair training with no SOB or LOB noted. Pt is baseline with mobility and denies acute PT needs at this time.  At this time, patient is functioning at their prior level of function and does not require further acute PT services.     Recent Surgery: * No surgery found *      Plan:     During this hospitalization, patient does not require further acute PT services.  Please re-consult if situation changes.     Plan of Care Reviewed with: patient    Subjective     Communicated with RN prior to session.  Patient found supine upon PT entry to room, agreeable to evaluation.      Chief Complaint: none noted  Patient comments/goals: "I'm not even out of breath," pt reported following mobility.   Pain/Comfort:  · Pain Rating 1: 0/10    Patients cultural, spiritual, Latter day conflicts given the current situation: none noted     Living Environment:  Pt lives with her son in a 2SH with 0 NAEEM; bedroom and bathroom on 2nd floor with L handrail to 2nd floor of home. Pt reports that PTA she was (I) with ambulation and ADLs. Pt reports that her son is able to assist upon d/c as needed.   Prior to admission, patients level of function was indep.  Patient has the following equipment: grab bar.  DME owned (not currently used): none.  Upon discharge, patient will have assistance from son.    Objective:     Patient found with: peripheral IV     General Precautions: Standard     Orthopedic " Precautions:N/A   Braces: N/A     Exams:  · Cognitive Exam:  Patient is oriented to Person, Place, Time and Situation and follows 100% of two-step commands   · Gross Motor Coordination:  WFL  · Sensation:    · -       Intact  · RLE ROM: WFL  · RLE Strength: WFL  · LLE ROM: WFL  · LLE Strength: WFL    Functional Mobility:  · Bed Mobility:     · Supine to Sit: modified independence with HOB elevated  · Transfers:     · Sit to Stand:  independence with no AD  · Gait: ~150 ft with no AD and with independence  · No gait deviations noted  · Denied SOB and weakness throughout  · Stairs:  Pt ascended/descended 1st stair x5 reps with No Assistive Device with left handrail with Supervision or Set-up Assistance.   · Stair training limited 2* length of IV line    AM-PAC 6 CLICK MOBILITY  Total Score:24       Therapeutic Activities and Exercises:  Pt educated on role of PT and PT POC including plan to d/c IP PT services at this time. Pt instructed to contact medical team if therapy needs arise during hospital admission. Pt verbalized understanding.   White board updated.    Patient left seated EOB with all lines intact, call button in reach and pt's friend present.    GOALS:    Physical Therapy Goals     Not on file                History:     Past Medical History:   Diagnosis Date    Diabetes mellitus     Hypercholesteremia     Hypertension     Miscarriage     Myocardial infarct x2    Stroke        Past Surgical History:   Procedure Laterality Date    CARDIAC SURGERY      cardiac stent placement    CORONARY STENT PLACEMENT      TUBAL LIGATION         Clinical Decision Making:     History  Co-morbidities and personal factors that may impact the plan of care Examination  Body Structures and Functions, activity limitations and participation restrictions that may impact the plan of care Clinical Presentation   Decision Making/ Complexity Score   Co-morbidities:   [] Time since onset of injury / illness / exacerbation  [x]  Status of current condition  []Patient's cognitive status and safety concerns    [] Multiple Medical Problems (see med hx)  Personal Factors:   [] Patient's age  [] Prior Level of function   [] Patient's home situation (environment and family support)  [] Patient's level of motivation  [] Expected progression of patient      HISTORY:(criteria)    [] 85513 - no personal factors/history    [x] 31799 - has 1-2 personal factor/comorbidity     [] 91168 - has >3 personal factor/comorbidity     Body Regions:  [] Objective examination findings  [] Head     []  Neck  [] Trunk   [] Upper Extremity  [] Lower Extremity    Body Systems:  [] For communication ability, affect, cognition, language, and learning style: the assessment of the ability to make needs known, consciousness, orientation (person, place, and time), expected emotional /behavioral responses, and learning preferences (eg, learning barriers, education  needs)  [x] For the neuromuscular system: a general assessment of gross coordinated movement (eg, balance, gait, locomotion, transfers, and transitions) and motor function  (motor control and motor learning)  [x] For the musculoskeletal system: the assessment of gross symmetry, gross range of motion, gross strength, height, and weight  [] For the integumentary system: the assessment of pliability(texture), presence of scar formation, skin color, and skin integrity  [] For cardiovascular/pulmonary system: the assessment of heart rate, respiratory rate, blood pressure, and edema     Activity limitations:    [] Patient's cognitive status and saf ety concerns          [] Status of current condition      [] Weight bearing restriction  [] Cardiopulmunary Restriction    Participation Restrictions:   [] Goals and goal agreement with the patient     [] Rehab potential (prognosis) and probable outcome      Examination of Body System: (criteria)    [x] 57191 - addressing 1-2 elements    [] 79980 - addressing a total of 3 or  more elements     [] 14304 -  Addressing a total of 4 or more elements         Clinical Presentation: (criteria)  Stable - 90264     On examination of body system using standardized tests and measures patient presents with 1-2 elements from any of the following: body structures and functions, activity limitations, and/or participation restrictions.  Leading to a clinical presentation that is considered stable and/or uncomplicated                              Clinical Decision Making  (Eval Complexity):  Low- 72842     Time Tracking:     PT Received On: 02/17/18  PT Start Time: 0922     PT Stop Time: 0932  PT Total Time (min): 10 min     Billable Minutes: Evaluation 10  (co-eval with OT)    Nancy Felix, PT, DPT   2/17/2018  [phone]

## 2018-02-17 NOTE — H&P
[facility] Hospital Medicine  History & Physical    Patient Name: Karen Mohan  MRN: [id number]  Admission Date: 2/16/2018  Attending Physician: Essence Umanzor MD   Primary Care Provider: Ghazala Valencia MD    [facility] Medicine Team: [facility] MED 2 Asia Monterroso MD     Patient information was obtained from patient and ER records.     Subjective:     Principal Problem:Weakness    Chief Complaint:   Chief Complaint   Patient presents with    Fatigue     Pt states that she has been feeling weak and having body aches since Wednesday. Pt states she gets tired when she does a little activity.         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    Patient started to feel not being like her usual 3-4 days ago, she describes it as feeling wobbly and week in her legs, associated with lightheadedness when walking, feeling that she is about to pass out. She mentions palpitation and mild shortness of breath, pain in her left forearm and upper back and nausea as well. She denies headache, dizziness, falling to one side, difficulty in speech, chest pain/tightness, diaphoresis, or any focal weakness or paresthesia. She denies any other GI or urinary symptoms, she has had enough PO intake in the past few days. She hasn't lost any weight recently.    Upon presentation to the ED she was tachycardic~110, O2 sat WNL, and no significant finding in the exam. Labs were significant for WBC 15k, upper limit normal Hgb, and normal Troponin I, BNP, u/a. ECG with no acute ischemic changes and CXR normal. Patient is getting admitted to the hospital medicine for further work up of the fatigue and weakness and observation.    Past Medical History:   Diagnosis Date    Diabetes mellitus     Hypercholesteremia      Hypertension     Miscarriage     Myocardial infarct x2    Stroke        Past Surgical History:   Procedure Laterality Date    CARDIAC SURGERY      cardiac stent placement    CORONARY STENT PLACEMENT      TUBAL LIGATION         Review of patient's allergies indicates:   Allergen Reactions    Ampicillin     Phenergan [promethazine]        No current facility-administered medications on file prior to encounter.      Current Outpatient Prescriptions on File Prior to Encounter   Medication Sig    amlodipine-benazepril (LOTREL) 10-40 mg per capsule Take 1 capsule by mouth once daily.    aspirin (ECOTRIN) 81 MG EC tablet Take 81 mg by mouth once daily.    atorvastatin (LIPITOR) 80 MG tablet Take 1 tablet (80 mg total) by mouth once daily.    blood pressure test kit-large Kit Use as directed    glimepiride (AMARYL) 4 MG tablet Take 4 mg by mouth before breakfast.      metformin (GLUCOPHAGE) 1000 MG tablet Take 1,000 mg by mouth 2 (two) times daily with meals.    metoprolol succinate (TOPROL-XL) 200 MG 24 hr tablet Take 200 mg by mouth once daily.    nitroGLYCERIN (NITROSTAT) 0.4 MG SL tablet Place 1 tablet (0.4 mg total) under the tongue every 5 (five) minutes as needed for Chest pain (Do not take more than three doses at one time. If having chest pain, report to nearest emergency room.).    spironolactone (ALDACTONE) 25 MG tablet Take 25 mg by mouth once daily.    vitamin D 1000 units Tab Take 185 mg by mouth once daily.    warfarin (COUMADIN) 7.5 MG tablet Take 7.5 mg by mouth once daily.     Family History     Problem Relation (Age of Onset)    Diabetes Maternal Aunt    Early death Mother    Heart disease Mother        Social History Main Topics    Smoking status: Current Every Day Smoker     Packs/day: 0.50     Years: 40.00    Smokeless tobacco: Never Used    Alcohol use Yes      Comment: occasionally    Drug use: No    Sexual activity: Yes     Partners: Male     Birth control/ protection: None      Review of Systems   Constitutional: Positive for activity change and fatigue. Negative for appetite change, chills, fever and unexpected weight change.   HENT: Negative for congestion, ear pain (but feels swollen), rhinorrhea, sneezing, sore throat, tinnitus, trouble swallowing and voice change.         Feels her head is swollen   Eyes: Negative for visual disturbance.   Respiratory: Negative for cough, chest tightness, shortness of breath and wheezing.    Cardiovascular: Positive for palpitations. Negative for chest pain and leg swelling.   Gastrointestinal: Positive for nausea. Negative for abdominal pain, blood in stool, diarrhea and vomiting.   Genitourinary: Negative for dysuria, frequency, hematuria and urgency.   Musculoskeletal: Positive for back pain (upper mid chest). Negative for arthralgias and myalgias.   Skin: Negative for pallor and rash.   Neurological: Positive for weakness and light-headedness. Negative for dizziness, syncope (few episodes of near syncope), speech difficulty, numbness and headaches.   Psychiatric/Behavioral: Negative for agitation and confusion. The patient is nervous/anxious.      Objective:     Vital Signs (Most Recent):  Temp: 98.8 °F (37.1 °C) (02/16/18 0602)  Pulse: 95 (02/16/18 1102)  Resp: 19 (02/16/18 1102)  BP: 130/61 (02/16/18 1102)  SpO2: 97 % (02/16/18 1102) Vital Signs (24h Range):  Temp:  [98.8 °F (37.1 °C)] 98.8 °F (37.1 °C)  Pulse:  [] 95  Resp:  [18-21] 19  SpO2:  [95 %-100 %] 97 %  BP: (123-155)/(60-83) 130/61     Weight: 82.6 kg (182 lb)  Body mass index is 28.51 kg/m².    Physical Exam   Constitutional: She is oriented to person, place, and time. She appears well-developed and well-nourished. She is cooperative. She does not appear ill. No distress.   HENT:   Head: Normocephalic and atraumatic.   Right Ear: Tympanic membrane normal. No drainage or swelling. Tympanic membrane is not bulging. No middle ear effusion.   Left Ear: Tympanic membrane normal.  No drainage or swelling. Tympanic membrane is not bulging.  No middle ear effusion.   Mouth/Throat: Mucous membranes are normal. Mucous membranes are not dry.   Eyes: Conjunctivae and EOM are normal. Pupils are equal, round, and reactive to light. No scleral icterus.   Neck: Normal range of motion. Neck supple. No JVD present. No thyroid mass and no thyromegaly present.   Cardiovascular: Regular rhythm and normal heart sounds.  Tachycardia present.  Exam reveals no gallop.    No murmur heard.  Pulses:       Radial pulses are 2+ on the right side, and 2+ on the left side.        Dorsalis pedis pulses are 2+ on the right side, and 2+ on the left side.   Pulmonary/Chest: Effort normal and breath sounds normal. No accessory muscle usage. No tachypnea. No respiratory distress. She has no decreased breath sounds. She has no wheezes. She has no rales.   Abdominal: Soft. Bowel sounds are normal. She exhibits no distension. There is no tenderness. There is no guarding.   Musculoskeletal: She exhibits no edema or tenderness.   Lymphadenopathy:        Head (right side): No submandibular adenopathy present.        Head (left side): No submandibular adenopathy present.     She has no cervical adenopathy.   Neurological: She is alert and oriented to person, place, and time. She has normal strength. She displays no tremor. No sensory deficit. She displays a negative Romberg sign. Coordination normal. GCS eye subscore is 4. GCS verbal subscore is 5. GCS motor subscore is 6.   Skin: Skin is warm and dry. She is not diaphoretic. No cyanosis or erythema. No pallor.   Psychiatric: Her speech is normal and behavior is normal. Thought content normal. Her mood appears anxious (tearful). Cognition and memory are normal.   Nursing note and vitals reviewed.        CRANIAL NERVES     CN III, IV, VI   Pupils are equal, round, and reactive to light.  Extraocular motions are normal.   Nystagmus: none     CN V   Facial sensation intact.     CN  VII   Facial expression full, symmetric.     CN VIII   CN VIII normal.     CN IX, X   CN IX normal.   CN X normal.     CN XI   CN XI normal.     CN XII   CN XII normal.        Significant Labs:   CBC:   Recent Labs  Lab 02/16/18  0655   WBC 15.97*   HGB 16.0   HCT 46.8        CMP:   Recent Labs  Lab 02/16/18  0655      K 3.8      CO2 22*   *   BUN 19   CREATININE 0.9   CALCIUM 9.7   PROT 7.9   ALBUMIN 3.4*   BILITOT 0.5   ALKPHOS 148*   AST 15   ALT 14   ANIONGAP 12   EGFRNONAA >60.0     Cardiac Markers:   Recent Labs  Lab 02/16/18  0655   BNP 38     Coagulation:   Recent Labs  Lab 02/16/18  0655   INR 2.6*     Troponin:   Recent Labs  Lab 02/16/18  0655   TROPONINI 0.011     Urine Studies:   Recent Labs  Lab 02/16/18  0902   COLORU Yellow   APPEARANCEUA Clear   PHUR 5.0   SPECGRAV 1.020   PROTEINUA Negative   GLUCUA Negative   KETONESU Negative   BILIRUBINUA Negative   OCCULTUA Negative   NITRITE Negative   UROBILINOGEN Negative   LEUKOCYTESUR Negative       Significant Imaging: CXR: I have reviewed all pertinent results/findings within the past 24 hours and my personal findings are:  no evidence of pulmonary edema, pleural effusion or any consolidations  EKG: I have reviewed all pertinent results/findings within the past 24 hours and my personal findings are: sinus tachyacardia, no ischemic changes    Assessment/Plan:     * Weakness    Assessment:  - patient with non specific generalized fatigue, lightheadedness and weakness in the legs. Is compliant with all medications. No flu like symptoms, or change in GI or urinary habit  - WBC 15.9  - Hgb 16  - influenza negative  - u/a negative  - CXR within normal limits  - Based on findings so far, symptoms are less likely to be due hypoglycemia, anemia, dehydration, or influenza (however still possible). Other differential diagnosis are electrolyte disturbances, hypothyroidism, orthostatic hypotension, polypharmacy, arrhythmias, occult  malignancies and lower on the list myositis, and fibromyalgia.     Plan:  - will obtain orthostatic vital signs  - will obtain TSH, B12, Ck, Mg, Phos  - will keep on cardiac monitoring        History of MI (myocardial infarction)    Assessment:   - Hx of STEMI x2 (2000 + 2012) s/p RCA PCI and stenting x2  - multiple risk factors: HTN, HLD, current smoker 20 PPD  - On Aspirin 81 mg qd + Atorvastatin 80 mg qd  - Most recent 2D echo 2/2016:     1 - Concentric remodeling.      2 - EF 45-50% with akinesis of the apex.      3 - Normal right ventricular systolic function .     4 - Left ventricular diastolic dysfunction.      5 - There is a small, partially calcified (old) thrombus in the cardiac apex.   - came in not feeling well, SOB, palpitation, lightheadedness, and pain in the L forearm and back  - initial ACS work up in the ED:   Troponin: 0.011   BNP: 38   ECG: sinus tachycardia, no ischemic changes   CXR: WNL  - more likely to be due to transient arrhythmia or medication mismanagement (patient mentions ran out of metoprolol a few days ago) rather than ACS, also patient very anxious about her health that is also contributing to her symptoms.    Plan:  - will keep on cardiac monitoring  - might benefit a cardiac event monitoring upon discharge if work up in the hospital inconclusive  - patient already anticoagulated in any case, INR within therapeutic levels  - will order nicotine patch qd  - will provide counseling for smoking cessation         Diabetes mellitus, type 2    - Home medications:    Metformin 1000 mg q12   Glimepiride 4mg qd   Insulin glargine 36 units qHS (per patient, but not in MAR)  - Most recent HbA1C.    6/2018: 10.2  - Glucose level currently: 129    Plan:  - will obtain an updated HbA1C  - will hold oral medications  - will start her on Detemir 18 units qHS + Aspart 6 units TIDWM  - will also add LD SSI  - will monitor glucose levels closely and adjust insulin based on requirements         Hyperlipidemia    - Home medications:   Atorvastatin 80 mg qd  - Most recent lipid panel 2/2016:        LDL 55   HDL 22    Plan:  - Will continue home dose  - Will obtain updated lipid panel        HTN (hypertension)    - Home medications:   Amlodipine- Benazepril 10-40 mg qd   Metoprolol succinate 200 mg qd   Spironolactone 25 mg qd  - Currently BP running ~125-150/ 60-80    Plan:  - Will resume all home medications          VTE Risk Mitigation         Ordered     warfarin (COUMADIN) tablet 5 mg  Every Tues, Thurs, Sat     Route:  Oral        02/16/18 1144     warfarin tablet 7.5 mg  Every Mon, Wed, Fri     Route:  Oral        02/16/18 1144     Reason for no Mechanical VTE Prophylaxis  Once      02/16/18 1109     High Risk of VTE  Once      02/16/18 1109             Asia Monterroso MD  Department of Hospital Medicine   [facility]

## 2018-02-17 NOTE — SUBJECTIVE & OBJECTIVE
Past Medical History:   Diagnosis Date    Diabetes mellitus     Hypercholesteremia     Hypertension     Miscarriage     Myocardial infarct x2    Stroke        Past Surgical History:   Procedure Laterality Date    CARDIAC SURGERY      cardiac stent placement    CORONARY STENT PLACEMENT      TUBAL LIGATION         Review of patient's allergies indicates:   Allergen Reactions    Ampicillin     Phenergan [promethazine]        No current facility-administered medications on file prior to encounter.      Current Outpatient Prescriptions on File Prior to Encounter   Medication Sig    amlodipine-benazepril (LOTREL) 10-40 mg per capsule Take 1 capsule by mouth once daily.    aspirin (ECOTRIN) 81 MG EC tablet Take 81 mg by mouth once daily.    atorvastatin (LIPITOR) 80 MG tablet Take 1 tablet (80 mg total) by mouth once daily.    glimepiride (AMARYL) 4 MG tablet Take 4 mg by mouth before breakfast.      metformin (GLUCOPHAGE) 1000 MG tablet Take 1,000 mg by mouth 2 (two) times daily with meals.    metoprolol succinate (TOPROL-XL) 200 MG 24 hr tablet Take 200 mg by mouth once daily.    spironolactone (ALDACTONE) 25 MG tablet Take 25 mg by mouth once daily.    vitamin D 1000 units Tab Take 185 mg by mouth once daily.    warfarin (COUMADIN) 7.5 MG tablet Take 7.5 mg by mouth every Mon, Wed, Fri.     blood pressure test kit-large Kit Use as directed    nitroGLYCERIN (NITROSTAT) 0.4 MG SL tablet Place 1 tablet (0.4 mg total) under the tongue every 5 (five) minutes as needed for Chest pain (Do not take more than three doses at one time. If having chest pain, report to nearest emergency room.).     Family History     Problem Relation (Age of Onset)    Diabetes Maternal Aunt    Early death Mother    Heart disease Mother        Social History Main Topics    Smoking status: Current Every Day Smoker     Packs/day: 0.50     Years: 40.00    Smokeless tobacco: Never Used    Alcohol use Yes      Comment:  occasionally    Drug use: No    Sexual activity: Yes     Partners: Male     Birth control/ protection: None     Review of Systems   Constitutional: Negative for appetite change, chills, fatigue, fever and unexpected weight change.   HENT: Negative for congestion, ear pain (but feels swollen), rhinorrhea, sneezing, sore throat, tinnitus, trouble swallowing and voice change.         Feels her head is swollen   Eyes: Negative for visual disturbance.   Respiratory: Negative for cough, chest tightness, shortness of breath and wheezing.    Cardiovascular: Negative for chest pain, palpitations and leg swelling.   Gastrointestinal: Negative for abdominal pain, blood in stool, diarrhea, nausea and vomiting.   Genitourinary: Negative for dysuria, frequency, hematuria and urgency.   Musculoskeletal: Negative for arthralgias and myalgias.   Skin: Negative for pallor and rash.   Neurological: Positive for light-headedness (while ambulating to the bathroom). Negative for dizziness, syncope (few episodes of near syncope at home), speech difficulty, weakness, numbness and headaches.   Psychiatric/Behavioral: Negative for agitation and confusion. The patient is not nervous/anxious.      Objective:     Vital Signs (Most Recent):  Temp: 98.1 °F (36.7 °C) (02/17/18 0750)  Pulse: 72 (02/17/18 0750)  Resp: 17 (02/17/18 0750)  BP: 104/62 (02/17/18 0750)  SpO2: 96 % (02/17/18 0750) Vital Signs (24h Range):  Temp:  [97.1 °F (36.2 °C)-98.1 °F (36.7 °C)] 98.1 °F (36.7 °C)  Pulse:  [] 72  Resp:  [17-28] 17  SpO2:  [94 %-100 %] 96 %  BP: (104-155)/(58-73) 104/62     Weight: 78.8 kg (173 lb 9.8 oz)  Body mass index is 27.19 kg/m².    Physical Exam   Constitutional: She is oriented to person, place, and time. She appears well-developed and well-nourished. She is cooperative. She does not appear ill. No distress.   HENT:   Head: Normocephalic and atraumatic.   Right Ear: Tympanic membrane normal. No drainage or swelling. Tympanic membrane  is not bulging. No middle ear effusion.   Left Ear: Tympanic membrane normal. No drainage or swelling. Tympanic membrane is not bulging.  No middle ear effusion.   Mouth/Throat: Mucous membranes are normal. Mucous membranes are not dry.   Eyes: Conjunctivae and EOM are normal. Pupils are equal, round, and reactive to light. No scleral icterus.   Neck: Normal range of motion. Neck supple. No JVD present. No thyroid mass and no thyromegaly present.   Cardiovascular: Regular rhythm and normal heart sounds.  Exam reveals no gallop.    No murmur heard.  Pulses:       Radial pulses are 2+ on the right side, and 2+ on the left side.        Dorsalis pedis pulses are 2+ on the right side, and 2+ on the left side.   Pulmonary/Chest: Effort normal and breath sounds normal. No accessory muscle usage. No tachypnea. No respiratory distress. She has no decreased breath sounds. She has no wheezes. She has no rales.   Abdominal: Soft. Bowel sounds are normal. She exhibits no distension. There is no tenderness. There is no guarding.   Musculoskeletal: She exhibits no edema or tenderness.   Lymphadenopathy:        Head (right side): No submandibular adenopathy present.        Head (left side): No submandibular adenopathy present.     She has no cervical adenopathy.   Neurological: She is alert and oriented to person, place, and time. She has normal strength. She displays no tremor. No sensory deficit. She displays a negative Romberg sign. Coordination normal. GCS eye subscore is 4. GCS verbal subscore is 5. GCS motor subscore is 6.   Skin: Skin is warm and dry. She is not diaphoretic. No cyanosis or erythema. No pallor.   Psychiatric: She has a normal mood and affect. Her speech is normal and behavior is normal. Thought content normal. Cognition and memory are normal.   Significantly improved mood, looking more relieved   Nursing note and vitals reviewed.        CRANIAL NERVES     CN III, IV, VI   Pupils are equal, round, and reactive  to light.  Extraocular motions are normal.   Nystagmus: none     CN V   Facial sensation intact.     CN VII   Facial expression full, symmetric.     CN VIII   CN VIII normal.     CN IX, X   CN IX normal.   CN X normal.     CN XI   CN XI normal.     CN XII   CN XII normal.        Significant Labs:   CBC:     Recent Labs  Lab 02/16/18  0655 02/17/18  0641   WBC 15.97* 12.22   HGB 16.0 15.5   HCT 46.8 45.9    235     CMP:     Recent Labs  Lab 02/16/18  0655 02/17/18  0641    138   K 3.8 4.0    105   CO2 22* 24   * 77   BUN 19 14   CREATININE 0.9 0.8   CALCIUM 9.7 9.5   PROT 7.9  --    ALBUMIN 3.4*  --    BILITOT 0.5  --    ALKPHOS 148*  --    AST 15  --    ALT 14  --    ANIONGAP 12 9   EGFRNONAA >60.0 >60.0     Cardiac Markers:     Recent Labs  Lab 02/16/18  0655   BNP 38     Coagulation:     Recent Labs  Lab 02/17/18  0641   INR 3.8*     Troponin:     Recent Labs  Lab 02/16/18  0655   TROPONINI 0.011     Urine Studies:     Recent Labs  Lab 02/16/18  0902   COLORU Yellow   APPEARANCEUA Clear   PHUR 5.0   SPECGRAV 1.020   PROTEINUA Negative   GLUCUA Negative   KETONESU Negative   BILIRUBINUA Negative   OCCULTUA Negative   NITRITE Negative   UROBILINOGEN Negative   LEUKOCYTESUR Negative       Significant Imaging: CXR: I have reviewed all pertinent results/findings within the past 24 hours and my personal findings are:  no evidence of pulmonary edema, pleural effusion or any consolidations  EKG: I have reviewed all pertinent results/findings within the past 24 hours and my personal findings are: sinus tachyacardia, no ischemic changes

## 2018-02-17 NOTE — PLAN OF CARE
Problem: Patient Care Overview  Goal: Plan of Care Review  Outcome: Ongoing (interventions implemented as appropriate)  Pt's VSS, NAD noted. 500mL bolus of NS still infusing. Pt aware of impending discharge after bolus is complete. Bed locked in lowest position, side rails upx2, call bell within reach. Pt's family at bedside. Pt denies pain at this time.

## 2018-02-18 NOTE — ASSESSMENT & PLAN NOTE
- Most recent lipid panel 2/2016:        LDL 55   HDL 22  - Home medications:   Atorvastatin 80 mg qd  - will continue upon discharge

## 2018-02-18 NOTE — ASSESSMENT & PLAN NOTE
Assessment:  - patient with non specific generalized fatigue, lightheadedness and weakness in the legs. Is compliant with all medications. No flu like symptoms, or change in GI or urinary habit  - WBC 15.9 --> 12.2 (most likely was volume contracted)  - influenza, u/a and CXR negative  - symptoms most likely related to dehydration and orthostatic hypotension

## 2018-02-18 NOTE — HOSPITAL COURSE
Upon presentation to the ED she was tachycardic~110, O2 sat WNL, and no significant finding in the exam. Labs were significant for WBC 15k, upper limit normal Hgb, and normal Troponin I, BNP, u/a. ECG with no acute ischemic changes and CXR normal. Patient is getting admitted to the hospital medicine for further work up of the fatigue and weakness and observation.  Found to have positive orthostatic hypotension associated with lightheadedness whenever she wanted to ambulate to the bathroom, resuscitated with IVF which improved her symptoms and overall feeling of well being. Patient agreeable to discharge. This morning INR 3.8, held the warfarin dose, and instructed the patient to hold the dose for tomorrow as well and check in with her coumadin clinic Monday morning.      Review of Systems   Constitutional: Negative for appetite change, chills, fatigue, fever and unexpected weight change.   HENT: Negative for congestion, ear pain (but feels swollen), rhinorrhea, sneezing, sore throat, tinnitus, trouble swallowing and voice change.         Feels her head is swollen   Eyes: Negative for visual disturbance.   Respiratory: Negative for cough, chest tightness, shortness of breath and wheezing.    Cardiovascular: Negative for chest pain, palpitations and leg swelling.   Gastrointestinal: Negative for abdominal pain, blood in stool, diarrhea, nausea and vomiting.   Genitourinary: Negative for dysuria, frequency, hematuria and urgency.   Musculoskeletal: Negative for arthralgias and myalgias.   Skin: Negative for pallor and rash.   Neurological: Positive for light-headedness (while ambulating to the bathroom). Negative for dizziness, syncope (few episodes of near syncope at home), speech difficulty, weakness, numbness and headaches.   Psychiatric/Behavioral: Negative for agitation and confusion. The patient is not nervous/anxious.       Objective:      Vital Signs (Most Recent):  Temp: 98.1 °F (36.7 °C) (02/17/18  0750)  Pulse: 72 (02/17/18 0750)  Resp: 17 (02/17/18 0750)  BP: 104/62 (02/17/18 0750)  SpO2: 96 % (02/17/18 0750) Vital Signs (24h Range):  Temp:  [97.1 °F (36.2 °C)-98.1 °F (36.7 °C)] 98.1 °F (36.7 °C)  Pulse:  [] 72  Resp:  [17-28] 17  SpO2:  [94 %-100 %] 96 %  BP: (104-155)/(58-73) 104/62      Weight: 78.8 kg (173 lb 9.8 oz)  Body mass index is 27.19 kg/m².     Physical Exam   Constitutional: She is oriented to person, place, and time. She appears well-developed and well-nourished. She is cooperative. She does not appear ill. No distress.   HENT:   Head: Normocephalic and atraumatic.   Right Ear: Tympanic membrane normal. No drainage or swelling. Tympanic membrane is not bulging. No middle ear effusion.   Left Ear: Tympanic membrane normal. No drainage or swelling. Tympanic membrane is not bulging.  No middle ear effusion.   Mouth/Throat: Mucous membranes are normal. Mucous membranes are not dry.   Eyes: Conjunctivae and EOM are normal. Pupils are equal, round, and reactive to light. No scleral icterus.   Neck: Normal range of motion. Neck supple. No JVD present. No thyroid mass and no thyromegaly present.   Cardiovascular: Regular rhythm and normal heart sounds.  Exam reveals no gallop.    No murmur heard.  Pulses:       Radial pulses are 2+ on the right side, and 2+ on the left side.        Dorsalis pedis pulses are 2+ on the right side, and 2+ on the left side.   Pulmonary/Chest: Effort normal and breath sounds normal. No accessory muscle usage. No tachypnea. No respiratory distress. She has no decreased breath sounds. She has no wheezes. She has no rales.   Abdominal: Soft. Bowel sounds are normal. She exhibits no distension. There is no tenderness. There is no guarding.   Musculoskeletal: She exhibits no edema or tenderness.   Lymphadenopathy:        Head (right side): No submandibular adenopathy present.        Head (left side): No submandibular adenopathy present.     She has no cervical adenopathy.    Neurological: She is alert and oriented to person, place, and time. She has normal strength. She displays no tremor. No sensory deficit. She displays a negative Romberg sign. Coordination normal. GCS eye subscore is 4. GCS verbal subscore is 5. GCS motor subscore is 6.   Skin: Skin is warm and dry. She is not diaphoretic. No cyanosis or erythema. No pallor.   Psychiatric: She has a normal mood and affect. Her speech is normal and behavior is normal. Thought content normal. Cognition and memory are normal.   Significantly improved mood, looking more relieved   Nursing note and vitals reviewed.

## 2018-02-18 NOTE — ASSESSMENT & PLAN NOTE
- Home medications:   Amlodipine- Benazepril 10-40 mg qd   Metoprolol succinate 200 mg qd   Spironolactone 25 mg qd  - BP WNL  - will continue upon discharge

## 2018-02-18 NOTE — ASSESSMENT & PLAN NOTE
Assessment:   - Hx of STEMI x2 (2000 + 2012) s/p RCA PCI and stenting x2  - multiple risk factors: HTN, HLD, current smoker 20 PPD  - On Aspirin 81 mg qd + Atorvastatin 80 mg qd  - came in not feeling well, SOB, palpitation, lightheadedness, and pain in the L forearm and back  - initial ACS work up in the ED:   Troponin: 0.011   BNP: 38   ECG: sinus tachycardia, no ischemic changes   CXR: WNL  - no events overnight on cardiac monitoring  - symptoms most likely related to dehydration and orthostatic hypotension which resolved with IVF  - provided smoking cessation counseling

## 2018-02-18 NOTE — DISCHARGE SUMMARY
[facility] Hospital Medicine  Discharge Summary      Patient Name: Karen Mohan  MRN: [id number]  Admission Date: 2/16/2018  Hospital Length of Stay: 0 days  Discharge Date and Time:  02/17/2018 6:15 PM  Attending Physician: No att. 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    Patient started to feel not being like her usual 3-4 days ago, she describes it as feeling wobbly and week in her legs, associated with lightheadedness when walking, feeling that she is about to pass out. She mentions palpitation and mild shortness of breath, pain in her left forearm and upper back and nausea as well. She denies headache, dizziness, falling to one side, difficulty in speech, chest pain/tightness, diaphoresis, or any focal weakness or paresthesia. She denies any other GI or urinary symptoms, she has had enough PO intake in the past few days. She hasn't lost any weight recently.      * No surgery found *      Hospital Course:   Upon presentation to the ED she was tachycardic~110, O2 sat WNL, and no significant finding in the exam. Labs were significant for WBC 15k, upper limit normal Hgb, and normal Troponin I, BNP, u/a. ECG with no acute ischemic changes and CXR normal. Patient is getting admitted to the hospital medicine for further work up of the fatigue and weakness and observation.  Found to have positive orthostatic hypotension associated with lightheadedness whenever she wanted to ambulate to the bathroom, resuscitated with IVF which improved her symptoms and overall feeling of well being. Patient agreeable to discharge. This morning  INR 3.8, held the warfarin dose, and instructed the patient to hold the dose for tomorrow as well and check in with her coumadin clinic Monday morning.      Review of Systems   Constitutional: Negative for appetite change, chills, fatigue, fever and unexpected weight change.   HENT: Negative for congestion, ear pain (but feels swollen), rhinorrhea, sneezing, sore throat, tinnitus, trouble swallowing and voice change.         Feels her head is swollen   Eyes: Negative for visual disturbance.   Respiratory: Negative for cough, chest tightness, shortness of breath and wheezing.    Cardiovascular: Negative for chest pain, palpitations and leg swelling.   Gastrointestinal: Negative for abdominal pain, blood in stool, diarrhea, nausea and vomiting.   Genitourinary: Negative for dysuria, frequency, hematuria and urgency.   Musculoskeletal: Negative for arthralgias and myalgias.   Skin: Negative for pallor and rash.   Neurological: Positive for light-headedness (while ambulating to the bathroom). Negative for dizziness, syncope (few episodes of near syncope at home), speech difficulty, weakness, numbness and headaches.   Psychiatric/Behavioral: Negative for agitation and confusion. The patient is not nervous/anxious.       Objective:      Vital Signs (Most Recent):  Temp: 98.1 °F (36.7 °C) (02/17/18 0750)  Pulse: 72 (02/17/18 0750)  Resp: 17 (02/17/18 0750)  BP: 104/62 (02/17/18 0750)  SpO2: 96 % (02/17/18 0750) Vital Signs (24h Range):  Temp:  [97.1 °F (36.2 °C)-98.1 °F (36.7 °C)] 98.1 °F (36.7 °C)  Pulse:  [] 72  Resp:  [17-28] 17  SpO2:  [94 %-100 %] 96 %  BP: (104-155)/(58-73) 104/62      Weight: 78.8 kg (173 lb 9.8 oz)  Body mass index is 27.19 kg/m².     Physical Exam   Constitutional: She is oriented to person, place, and time. She appears well-developed and well-nourished. She is cooperative. She does not appear ill. No distress.   HENT:   Head: Normocephalic and atraumatic.   Right Ear: Tympanic membrane  normal. No drainage or swelling. Tympanic membrane is not bulging. No middle ear effusion.   Left Ear: Tympanic membrane normal. No drainage or swelling. Tympanic membrane is not bulging.  No middle ear effusion.   Mouth/Throat: Mucous membranes are normal. Mucous membranes are not dry.   Eyes: Conjunctivae and EOM are normal. Pupils are equal, round, and reactive to light. No scleral icterus.   Neck: Normal range of motion. Neck supple. No JVD present. No thyroid mass and no thyromegaly present.   Cardiovascular: Regular rhythm and normal heart sounds.  Exam reveals no gallop.    No murmur heard.  Pulses:       Radial pulses are 2+ on the right side, and 2+ on the left side.        Dorsalis pedis pulses are 2+ on the right side, and 2+ on the left side.   Pulmonary/Chest: Effort normal and breath sounds normal. No accessory muscle usage. No tachypnea. No respiratory distress. She has no decreased breath sounds. She has no wheezes. She has no rales.   Abdominal: Soft. Bowel sounds are normal. She exhibits no distension. There is no tenderness. There is no guarding.   Musculoskeletal: She exhibits no edema or tenderness.   Lymphadenopathy:        Head (right side): No submandibular adenopathy present.        Head (left side): No submandibular adenopathy present.     She has no cervical adenopathy.   Neurological: She is alert and oriented to person, place, and time. She has normal strength. She displays no tremor. No sensory deficit. She displays a negative Romberg sign. Coordination normal. GCS eye subscore is 4. GCS verbal subscore is 5. GCS motor subscore is 6.   Skin: Skin is warm and dry. She is not diaphoretic. No cyanosis or erythema. No pallor.   Psychiatric: She has a normal mood and affect. Her speech is normal and behavior is normal. Thought content normal. Cognition and memory are normal.   Significantly improved mood, looking more relieved   Nursing note and vitals reviewed.       Consults:     *  Weakness    Assessment:  - patient with non specific generalized fatigue, lightheadedness and weakness in the legs. Is compliant with all medications. No flu like symptoms, or change in GI or urinary habit  - WBC 15.9 --> 12.2 (most likely was volume contracted)  - TSH, B12, CK WNL  - influenza, u/a and CXR negative  - symptoms most likely related to dehydration and orthostatic hypotension        History of MI (myocardial infarction)    Assessment:   - Hx of STEMI x2 (2000 + 2012) s/p RCA PCI and stenting x2  - multiple risk factors: HTN, HLD, current smoker 20 PPD  - On Aspirin 81 mg qd + Atorvastatin 80 mg qd  - came in not feeling well, SOB, palpitation, lightheadedness, and pain in the L forearm and back  - initial ACS work up in the ED:   Troponin: 0.011   BNP: 38   ECG: sinus tachycardia, no ischemic changes   CXR: WNL  - no events overnight on cardiac monitoring  - symptoms most likely related to dehydration and orthostatic hypotension which resolved with IVF  - provided smoking cessation counseling        Diabetes mellitus, type 2    - Home medications:    Metformin 1000 mg q12   Glimepiride 4mg qd   Insulin glargine 36 units qHS (per patient, but not in MAR)  - Most recent HbA1C.    6/2018: 10.2   2/16/18:  7.7  - Glucose WNL  - will resume all home medications upon discharge        Hyperlipidemia    - Most recent lipid panel 2/2016:        LDL 55   HDL 22  - Home medications:   Atorvastatin 80 mg qd  - will continue upon discharge        HTN (hypertension)    - Home medications:   Amlodipine- Benazepril 10-40 mg qd   Metoprolol succinate 200 mg qd   Spironolactone 25 mg qd  - BP WNL  - will continue upon discharge          Final Active Diagnoses:    Diagnosis Date Noted POA    PRINCIPAL PROBLEM:  Weakness [R53.1] 02/16/2018 Yes    History of MI (myocardial infarction) [I25.2] 03/06/2013 Not Applicable     Chronic    Hyperlipidemia [E78.5] 12/09/2012 Yes     Chronic    HTN (hypertension) [I10]  12/09/2012 Yes     Chronic    Diabetes mellitus, type 2 [E11.9] 12/09/2012 Yes     Chronic      Problems Resolved During this Admission:    Diagnosis Date Noted Date Resolved POA       Discharged Condition: good    Disposition: Home or Self Care    Follow Up:  Follow-up Information     Ghazala Valencia MD.    Specialty:  Family Medicine  Contact information:  [address]  [facility] [id number]  [phone]                 Patient Instructions:   Please follow up with your coumadin clinic early next week for adjustments in your warfarin.      Significant Diagnostic Studies: Labs:   CMP   Recent Labs  Lab 02/16/18  0655 02/17/18  0641    138   K 3.8 4.0    105   CO2 22* 24   * 77   BUN 19 14   CREATININE 0.9 0.8   CALCIUM 9.7 9.5   PROT 7.9  --    ALBUMIN 3.4*  --    BILITOT 0.5  --    ALKPHOS 148*  --    AST 15  --    ALT 14  --    ANIONGAP 12 9   ESTGFRAFRICA >60.0 >60.0   EGFRNONAA >60.0 >60.0   , CBC   Recent Labs  Lab 02/16/18  0655 02/17/18  0641   WBC 15.97* 12.22   HGB 16.0 15.5   HCT 46.8 45.9    235   , INR   Lab Results   Component Value Date    INR 3.8 (H) 02/17/2018    INR 2.6 (H) 02/16/2018    INR 2.6 (H) 06/19/2014   , Lipid Panel   Lab Results   Component Value Date    CHOL 112 (L) 02/26/2016    HDL 22 (L) 02/26/2016    LDLCALC 55.0 (L) 02/26/2016    TRIG 175 (H) 02/26/2016    CHOLHDL 19.6 (L) 02/26/2016   , Troponin   Recent Labs  Lab 02/16/18  0655   TROPONINI 0.011    and A1C:   Recent Labs  Lab 02/16/18  0655   HGBA1C 7.7*     Radiology:    X-Ray Chest PA and Lateral (CXR):   Results for orders placed or performed during the hospital encounter of 02/16/18   X-Ray Chest PA And Lateral    Narrative    Technique: PA and LAT chest radiographs.  Comparison: Radiograph 06/18/2014.  Findings:    Cardiac monitoring leads project over the bilateral hemithoraces.  Mediastinal structures are midline. Cardiac silhouette is normal in size. Lung volumes are normal and  symmetric.  No pulmonary consolidation.  No pneumothorax or pleural effusion. No free air beneath the diaphragm. Bones demonstrate no acute abnormalities.  Cholecystectomy clips noted.    Impression    No acute radiographic findings in the chest.    Electronically signed by: SALVATORE GARNICA MD  Date:     02/16/18  Time:    07:25      Cardiac Graphics: ECG: sinus tachycardia TWI in inferolateral leads (old)    Pending Diagnostic Studies:     None         Medications:  Reconciled Home Medications:   Discharge Medication List as of 2/17/2018 11:41 AM      CONTINUE these medications which have NOT CHANGED    Details   amlodipine-benazepril (LOTREL) 10-40 mg per capsule Take 1 capsule by mouth once daily., Starting 12/12/2012, Until Discontinued, Print      aspirin (ECOTRIN) 81 MG EC tablet Take 81 mg by mouth once daily., Until Discontinued, Historical Med      atorvastatin (LIPITOR) 80 MG tablet Take 1 tablet (80 mg total) by mouth once daily., Starting 12/12/2012, Until Discontinued, Print      blood pressure test kit-large Kit Use as directed, Print      glimepiride (AMARYL) 4 MG tablet Take 4 mg by mouth before breakfast.  , Until Discontinued, Historical Med      insulin glargine (LANTUS) 100 unit/mL injection Inject 36 Units into the skin every evening., Historical Med      metformin (GLUCOPHAGE) 1000 MG tablet Take 1,000 mg by mouth 2 (two) times daily with meals., Until Discontinued, Historical Med      metoprolol succinate (TOPROL-XL) 200 MG 24 hr tablet Take 200 mg by mouth once daily., Until Discontinued, Historical Med      nitroGLYCERIN (NITROSTAT) 0.4 MG SL tablet Place 1 tablet (0.4 mg total) under the tongue every 5 (five) minutes as needed for Chest pain (Do not take more than three doses at one time. If having chest pain, report to nearest emergency room.)., Starting 2/15/2016, Until Discontinued, Print      spironolactone (ALDACTONE) 25 MG tablet Take 25 mg by mouth once daily., Until Discontinued,  Historical Med      vitamin D 1000 units Tab Take 185 mg by mouth once daily., Until Discontinued, Historical Med      !! warfarin (COUMADIN) 5 MG tablet Take 5 mg by mouth every Tues, Thurs, Sat., Historical Med      !! warfarin (COUMADIN) 7.5 MG tablet Take 7.5 mg by mouth every Mon, Wed, Fri. , Historical Med       !! - Potential duplicate medications found. Please discuss with provider.          Indwelling Lines/Drains at time of discharge:   Lines/Drains/Airways          No matching active lines, drains, or airways        Time spent on the discharge of patient: 45 minutes  Patient was seen and examined on the date of discharge and determined to be suitable for discharge.      Asia Monterroso MD  Department of Hospital Medicine  [facility]

## 2019-12-16 ENCOUNTER — HOSPITAL ENCOUNTER (EMERGENCY)
Facility: HOSPITAL | Age: 64
Discharge: HOME | End: 2019-12-16
Attending: EMERGENCY MEDICINE
Payer: COMMERCIAL

## 2019-12-16 VITALS
SYSTOLIC BLOOD PRESSURE: 191 MMHG | RESPIRATION RATE: 18 BRPM | HEART RATE: 90 BPM | TEMPERATURE: 99 F | WEIGHT: 171 LBS | OXYGEN SATURATION: 98 % | BODY MASS INDEX: 26.84 KG/M2 | DIASTOLIC BLOOD PRESSURE: 81 MMHG | HEIGHT: 67 IN

## 2019-12-16 DIAGNOSIS — M79.602 LEFT ARM PAIN: Primary | ICD-10-CM

## 2019-12-16 DIAGNOSIS — R20.2 PARESTHESIAS: ICD-10-CM

## 2019-12-16 LAB
ANION GAP SERPL CALC-SCNC: 10 MMOL/L (ref 8–16)
BASOPHILS # BLD AUTO: 0.15 K/UL (ref 0–0.2)
BASOPHILS NFR BLD: 1 % (ref 0–1.9)
BUN SERPL-MCNC: 15 MG/DL (ref 8–23)
CALCIUM SERPL-MCNC: 9.7 MG/DL (ref 8.7–10.5)
CHLORIDE SERPL-SCNC: 103 MMOL/L (ref 95–110)
CO2 SERPL-SCNC: 25 MMOL/L (ref 23–29)
CREAT SERPL-MCNC: 0.8 MG/DL (ref 0.5–1.4)
DIFFERENTIAL METHOD: ABNORMAL
EOSINOPHIL # BLD AUTO: 0.1 K/UL (ref 0–0.5)
EOSINOPHIL NFR BLD: 0.9 % (ref 0–8)
ERYTHROCYTE [DISTWIDTH] IN BLOOD BY AUTOMATED COUNT: 14.6 % (ref 11.5–14.5)
EST. GFR  (AFRICAN AMERICAN): >60 ML/MIN/1.73 M^2
EST. GFR  (NON AFRICAN AMERICAN): >60 ML/MIN/1.73 M^2
GLUCOSE SERPL-MCNC: 195 MG/DL (ref 70–110)
HCT VFR BLD AUTO: 50.7 % (ref 37–48.5)
HGB BLD-MCNC: 16.6 G/DL (ref 12–16)
IMM GRANULOCYTES # BLD AUTO: 0.07 K/UL (ref 0–0.04)
IMM GRANULOCYTES NFR BLD AUTO: 0.5 % (ref 0–0.5)
LYMPHOCYTES # BLD AUTO: 3.9 K/UL (ref 1–4.8)
LYMPHOCYTES NFR BLD: 25.7 % (ref 18–48)
MCH RBC QN AUTO: 31.3 PG (ref 27–31)
MCHC RBC AUTO-ENTMCNC: 32.7 G/DL (ref 32–36)
MCV RBC AUTO: 96 FL (ref 82–98)
MONOCYTES # BLD AUTO: 0.7 K/UL (ref 0.3–1)
MONOCYTES NFR BLD: 4.7 % (ref 4–15)
NEUTROPHILS # BLD AUTO: 10.2 K/UL (ref 1.8–7.7)
NEUTROPHILS NFR BLD: 67.2 % (ref 38–73)
NRBC BLD-RTO: 0 /100 WBC
PLATELET # BLD AUTO: 243 K/UL (ref 150–350)
PMV BLD AUTO: 11 FL (ref 9.2–12.9)
POTASSIUM SERPL-SCNC: 4.1 MMOL/L (ref 3.5–5.1)
RBC # BLD AUTO: 5.3 M/UL (ref 4–5.4)
SODIUM SERPL-SCNC: 138 MMOL/L (ref 136–145)
TROPONIN I SERPL DL<=0.01 NG/ML-MCNC: 0.02 NG/ML (ref 0–0.03)
WBC # BLD AUTO: 15.23 K/UL (ref 3.9–12.7)

## 2019-12-16 PROCEDURE — 36000 PLACE NEEDLE IN VEIN: CPT

## 2019-12-16 PROCEDURE — 93010 EKG 12-LEAD: ICD-10-PCS | Mod: ,,, | Performed by: INTERNAL MEDICINE

## 2019-12-16 PROCEDURE — 85025 COMPLETE CBC W/AUTO DIFF WBC: CPT

## 2019-12-16 PROCEDURE — 99284 EMERGENCY DEPT VISIT MOD MDM: CPT | Mod: ,,, | Performed by: EMERGENCY MEDICINE

## 2019-12-16 PROCEDURE — 93010 ELECTROCARDIOGRAM REPORT: CPT | Mod: ,,, | Performed by: INTERNAL MEDICINE

## 2019-12-16 PROCEDURE — 99284 PR EMERGENCY DEPT VISIT,LEVEL IV: ICD-10-PCS | Mod: ,,, | Performed by: EMERGENCY MEDICINE

## 2019-12-16 PROCEDURE — 80048 BASIC METABOLIC PNL TOTAL CA: CPT

## 2019-12-16 PROCEDURE — 84484 ASSAY OF TROPONIN QUANT: CPT

## 2019-12-16 PROCEDURE — 93005 ELECTROCARDIOGRAM TRACING: CPT

## 2019-12-16 PROCEDURE — 99284 EMERGENCY DEPT VISIT MOD MDM: CPT | Mod: 25

## 2019-12-17 NOTE — DISCHARGE INSTRUCTIONS
Take Tylenol over the counter as directed on packaging as needed for pain.  Use heating pad to neck.    Our goal in the emergency department is to always give you outstanding care and exceptional service. You may receive a survey by mail or e-mail in the next week regarding your experience in our ED. We would greatly appreciate your completing and returning the survey. Your feedback provides us with a way to recognize our staff who give very good care and it helps us learn how to improve when your experience was below our aspiration of excellence.
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
## 2019-12-17 NOTE — ED TRIAGE NOTES
Asked to eval patient in triage given vague neuro complaints.  No AMS, facial droop, extremity weakness, or slurred speech.  No code stroke indicated.  She will be evaluated in Intake.  Did bedside teaching.  All questions answered.  Patient acknowledges understanding.
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
## 2020-10-12 ENCOUNTER — TELEPHONE (OUTPATIENT)
Dept: HEMATOLOGY/ONCOLOGY | Facility: CLINIC | Age: 65
End: 2020-10-12

## 2020-10-13 ENCOUNTER — TELEPHONE (OUTPATIENT)
Dept: HEMATOLOGY/ONCOLOGY | Facility: CLINIC | Age: 65
End: 2020-10-13

## 2020-10-13 NOTE — TELEPHONE ENCOUNTER
----- Message from Shayan Moran sent at 10/12/2020 10:05 AM CDT -----  Dr. Erik Alvarez would like to refer the following patient to the hematology department for leukocytosis.I have scanned the patients referral and records into media manager. If there are any further questions in regards to the patient, please contact the referring office at, [phone].  Please let me know if I can help schedule in any way.    Thank you,  Shayan Michael

## 2020-10-19 ENCOUNTER — TELEPHONE (OUTPATIENT)
Dept: HEMATOLOGY/ONCOLOGY | Facility: CLINIC | Age: 65
End: 2020-10-19

## 2020-10-20 ENCOUNTER — TELEPHONE (OUTPATIENT)
Dept: HEMATOLOGY/ONCOLOGY | Facility: CLINIC | Age: 65
End: 2020-10-20

## 2020-10-30 ENCOUNTER — TELEPHONE (OUTPATIENT)
Dept: HEMATOLOGY/ONCOLOGY | Facility: CLINIC | Age: 65
End: 2020-10-30

## 2020-10-30 NOTE — TELEPHONE ENCOUNTER
----- Message from Marisol Negro sent at 10/30/2020 11:28 AM CDT -----  Contact: pt  Returning a call.  Speak with nurse       Call back :[phone]

## 2020-10-30 NOTE — TELEPHONE ENCOUNTER
----- Message from Marisol Negro sent at 10/30/2020 11:28 AM CDT -----  Contact: pt  Returning a call.  Speak with nurse       Call back :[phone]

## 2020-11-02 ENCOUNTER — HOSPITAL ENCOUNTER (OUTPATIENT)
Dept: RADIOLOGY | Facility: HOSPITAL | Age: 65
Discharge: HOME | End: 2020-11-02
Attending: INTERNAL MEDICINE
Payer: MEDICARE

## 2020-11-02 ENCOUNTER — OFFICE VISIT (OUTPATIENT)
Dept: HEMATOLOGY/ONCOLOGY | Facility: CLINIC | Age: 65
End: 2020-11-02
Payer: MEDICARE

## 2020-11-02 VITALS
WEIGHT: 169.75 LBS | BODY MASS INDEX: 26.64 KG/M2 | DIASTOLIC BLOOD PRESSURE: 68 MMHG | RESPIRATION RATE: 20 BRPM | TEMPERATURE: 98 F | HEIGHT: 67 IN | SYSTOLIC BLOOD PRESSURE: 134 MMHG | HEART RATE: 83 BPM | OXYGEN SATURATION: 97 %

## 2020-11-02 DIAGNOSIS — D72.823 LEUKEMOID REACTION: ICD-10-CM

## 2020-11-02 DIAGNOSIS — Z72.0 TOBACCO ABUSE: Primary | ICD-10-CM

## 2020-11-02 DIAGNOSIS — R71.8 HIGH HEMATOCRIT: ICD-10-CM

## 2020-11-02 DIAGNOSIS — E11.9 TYPE 2 DIABETES MELLITUS WITHOUT COMPLICATION, WITH LONG-TERM CURRENT USE OF INSULIN: Chronic | ICD-10-CM

## 2020-11-02 DIAGNOSIS — Z79.4 TYPE 2 DIABETES MELLITUS WITHOUT COMPLICATION, WITH LONG-TERM CURRENT USE OF INSULIN: Chronic | ICD-10-CM

## 2020-11-02 DIAGNOSIS — Z86.73 H/O: CVA (CEREBROVASCULAR ACCIDENT): Chronic | ICD-10-CM

## 2020-11-02 DIAGNOSIS — I10 ESSENTIAL HYPERTENSION: Chronic | ICD-10-CM

## 2020-11-02 DIAGNOSIS — E78.5 HYPERLIPIDEMIA, UNSPECIFIED HYPERLIPIDEMIA TYPE: Chronic | ICD-10-CM

## 2020-11-02 LAB
BACTERIA #/AREA URNS AUTO: ABNORMAL /HPF
BILIRUB UR QL STRIP: NEGATIVE
CLARITY UR REFRACT.AUTO: ABNORMAL
COLOR UR AUTO: YELLOW
GLUCOSE UR QL STRIP: NEGATIVE
HGB UR QL STRIP: ABNORMAL
KETONES UR QL STRIP: NEGATIVE
LEUKOCYTE ESTERASE UR QL STRIP: ABNORMAL
MICROSCOPIC COMMENT: ABNORMAL
NITRITE UR QL STRIP: POSITIVE
PH UR STRIP: 5 [PH] (ref 5–8)
PROT UR QL STRIP: NEGATIVE
RBC #/AREA URNS AUTO: 0 /HPF (ref 0–4)
SP GR UR STRIP: 1.01 (ref 1–1.03)
SQUAMOUS #/AREA URNS AUTO: 27 /HPF
URN SPEC COLLECT METH UR: ABNORMAL
WBC #/AREA URNS AUTO: 20 /HPF (ref 0–5)

## 2020-11-02 PROCEDURE — 81001 URINALYSIS AUTO W/SCOPE: CPT

## 2020-11-02 PROCEDURE — 3075F SYST BP GE 130 - 139MM HG: CPT | Mod: CPTII,S$GLB,, | Performed by: INTERNAL MEDICINE

## 2020-11-02 PROCEDURE — 3075F PR MOST RECENT SYSTOLIC BLOOD PRESS GE 130-139MM HG: ICD-10-PCS | Mod: CPTII,S$GLB,, | Performed by: INTERNAL MEDICINE

## 2020-11-02 PROCEDURE — 99999 PR PBB SHADOW E&M-EST. PATIENT-LVL V: ICD-10-PCS | Mod: PBBFAC,,, | Performed by: INTERNAL MEDICINE

## 2020-11-02 PROCEDURE — 71046 XR CHEST PA AND LATERAL: ICD-10-PCS | Mod: 26,,, | Performed by: RADIOLOGY

## 2020-11-02 PROCEDURE — 71046 X-RAY EXAM CHEST 2 VIEWS: CPT | Mod: 26,,, | Performed by: RADIOLOGY

## 2020-11-02 PROCEDURE — 71046 X-RAY EXAM CHEST 2 VIEWS: CPT | Mod: TC,FY

## 2020-11-02 PROCEDURE — 3008F PR BODY MASS INDEX (BMI) DOCUMENTED: ICD-10-PCS | Mod: CPTII,S$GLB,, | Performed by: INTERNAL MEDICINE

## 2020-11-02 PROCEDURE — 99205 OFFICE O/P NEW HI 60 MIN: CPT | Mod: S$GLB,,, | Performed by: INTERNAL MEDICINE

## 2020-11-02 PROCEDURE — 3078F DIAST BP <80 MM HG: CPT | Mod: CPTII,S$GLB,, | Performed by: INTERNAL MEDICINE

## 2020-11-02 PROCEDURE — 99999 PR PBB SHADOW E&M-EST. PATIENT-LVL V: CPT | Mod: PBBFAC,,, | Performed by: INTERNAL MEDICINE

## 2020-11-02 PROCEDURE — 3078F PR MOST RECENT DIASTOLIC BLOOD PRESSURE < 80 MM HG: ICD-10-PCS | Mod: CPTII,S$GLB,, | Performed by: INTERNAL MEDICINE

## 2020-11-02 PROCEDURE — 99205 PR OFFICE/OUTPT VISIT, NEW, LEVL V, 60-74 MIN: ICD-10-PCS | Mod: S$GLB,,, | Performed by: INTERNAL MEDICINE

## 2020-11-02 PROCEDURE — 3008F BODY MASS INDEX DOCD: CPT | Mod: CPTII,S$GLB,, | Performed by: INTERNAL MEDICINE

## 2020-11-02 RX ORDER — INSULIN DEGLUDEC 200 U/ML
INJECTION, SOLUTION SUBCUTANEOUS
COMMUNITY
Start: 2020-10-05

## 2020-11-02 RX ORDER — ALBUTEROL SULFATE 90 UG/1
1 AEROSOL, METERED RESPIRATORY (INHALATION) EVERY 4 HOURS PRN
COMMUNITY

## 2020-11-02 RX ORDER — FLUTICASONE PROPIONATE 50 UG/1
POWDER, METERED RESPIRATORY (INHALATION)
COMMUNITY

## 2020-11-02 NOTE — Clinical Note
Cbc, cmp, ldh, uric acid, CRP, bcr abl p190, bcr abl p210, UA, Chest xray 2 views, carbon monoxide, erythropoietin today    F/u in 6 months with cbc, ldh

## 2020-11-02 NOTE — PROGRESS NOTES
CC: Leukocytosis, hematology consultation    HPI:  Ms. Mohan, 64, is here for hematology consultation for elevated WBC count,. She has DM type 2, HLD, HTn, h/o MI and CVA. She is long time and current smoker.   WBC count and/or neutrophil count has been high since 2008. No significant changes in the past 2 years. Platelets are normal. Hematocrit has been intermittently elevated.   CBC on 8/10/20 showed a WBC count of 12.6k, with ANC of 8757. Hematocrit was mildly elevated.    She denies dysuria / hematuria, cough, dyspnea, rash, ear ache or ear discharge, weight loss, change in appetite, night sweats, diarrhea, joint pain/ swelling or sore throat.         Review of Systems   Constitutional: Positive for malaise/fatigue. Negative for chills, diaphoresis, fever and weight loss.   HENT: Negative for ear pain, hearing loss and tinnitus.    Eyes: Negative for blurred vision, double vision, photophobia and pain.   Respiratory: Negative for cough, hemoptysis and sputum production.    Cardiovascular: Negative for chest pain, orthopnea and claudication.   Gastrointestinal: Negative for abdominal pain, blood in stool, diarrhea, heartburn, melena and vomiting.   Genitourinary: Negative for dysuria, hematuria and urgency.   Musculoskeletal: Negative for back pain, joint pain and myalgias.   Neurological: Negative for dizziness, tremors, sensory change, speech change, focal weakness and seizures.   Endo/Heme/Allergies: Negative for environmental allergies. Does not bruise/bleed easily.   Psychiatric/Behavioral: Negative for hallucinations, substance abuse and suicidal ideas. The patient is nervous/anxious.        Past Medical History:   Diagnosis Date    Diabetes mellitus     Hypercholesteremia     Hypertension     Miscarriage     Myocardial infarct x2    Stroke            Past Surgical History:   Procedure Laterality Date    CARDIAC SURGERY      cardiac stent placement    CORONARY STENT PLACEMENT      TUBAL  LIGATION           Social History     Socioeconomic History    Marital status:      Spouse name: Not on file    Number of children: Not on file    Years of education: Not on file    Highest education level: Not on file   Occupational History    Not on file   Social Needs    Financial resource strain: Not on file    Food insecurity     Worry: Not on file     Inability: Not on file    Transportation needs     Medical: Not on file     Non-medical: Not on file   Tobacco Use    Smoking status: Current Every Day Smoker     Packs/day: 0.50     Years: 40.00     Pack years: 20.00    Smokeless tobacco: Never Used   Substance and Sexual Activity    Alcohol use: Yes     Comment: occasionally    Drug use: No    Sexual activity: Yes     Partners: Male     Birth control/protection: None         Review of patient's allergies indicates:   Allergen Reactions    Ampicillin     Phenergan [promethazine]            Current Outpatient Medications   Medication Sig    amlodipine-benazepril (LOTREL) 10-40 mg per capsule Take 1 capsule by mouth once daily.    aspirin (ECOTRIN) 81 MG EC tablet Take 81 mg by mouth once daily.    atorvastatin (LIPITOR) 80 MG tablet Take 1 tablet (80 mg total) by mouth once daily.    blood pressure test kit-large Kit Use as directed    glimepiride (AMARYL) 4 MG tablet Take 4 mg by mouth before breakfast.      insulin glargine (LANTUS) 100 unit/mL injection Inject 36 Units into the skin every evening.    metformin (GLUCOPHAGE) 1000 MG tablet Take 1,000 mg by mouth 2 (two) times daily with meals.    metoprolol succinate (TOPROL-XL) 200 MG 24 hr tablet Take 200 mg by mouth once daily.    nitroGLYCERIN (NITROSTAT) 0.4 MG SL tablet Place 1 tablet (0.4 mg total) under the tongue every 5 (five) minutes as needed for Chest pain (Do not take more than three doses at one time. If having chest pain, report to nearest emergency room.).    spironolactone (ALDACTONE) 25 MG tablet Take 25 mg by  mouth once daily.    vitamin D 1000 units Tab Take 185 mg by mouth once daily.    warfarin (COUMADIN) 5 MG tablet Take 5 mg by mouth every Tues, Thurs, Sat.    warfarin (COUMADIN) 7.5 MG tablet Take 7.5 mg by mouth every Mon, Wed, Fri.      No current facility-administered medications for this visit.        Vitals:    11/02/20 0940   BP: 134/68   Pulse: 83   Resp: 20   Temp: 97.7 °F (36.5 °C)         Physical Exam   Constitutional: She is oriented to person, place, and time. She appears well-developed.   HENT:   Head: Normocephalic and atraumatic.   Mouth/Throat: No oropharyngeal exudate.   Eyes: No scleral icterus.   Cardiovascular: Normal rate.   No murmur heard.  Pulmonary/Chest: Effort normal. No respiratory distress. She has no rales.   She has globally diminished breath sounds.    Abdominal: Soft. She exhibits no distension. There is no abdominal tenderness. There is no rebound.   Musculoskeletal:         General: No edema.   Lymphadenopathy:     She has no cervical adenopathy.   Neurological: She is alert and oriented to person, place, and time. No cranial nerve deficit.   Skin: Skin is warm.   Psychiatric: She has a normal mood and affect.     Component      Latest Ref Rng & Units 12/16/2019   WBC      3.90 - 12.70 K/uL 15.23 (H)   RBC      4.00 - 5.40 M/uL 5.30   Hemoglobin      12.0 - 16.0 g/dL 16.6 (H)   Hematocrit      37.0 - 48.5 % 50.7 (H)   MCV      82 - 98 fL 96   MCH      27.0 - 31.0 pg 31.3 (H)   MCHC      32.0 - 36.0 g/dL 32.7   RDW      11.5 - 14.5 % 14.6 (H)   Platelets      150 - 350 K/uL 243   MPV      9.2 - 12.9 fL 11.0   Immature Granulocytes      0.0 - 0.5 % 0.5   Gran # (ANC)      1.8 - 7.7 K/uL 10.2 (H)   Immature Grans (Abs)      0.00 - 0.04 K/uL 0.07 (H)   Lymph #      1.0 - 4.8 K/uL 3.9   Mono #      0.3 - 1.0 K/uL 0.7   Eos #      0.0 - 0.5 K/uL 0.1   Baso #      0.00 - 0.20 K/uL 0.15   nRBC      0 /100 WBC 0   Gran %      38.0 - 73.0 % 67.2   Lymph %      18.0 - 48.0 % 25.7   Mono  %      4.0 - 15.0 % 4.7   Eosinophil %      0.0 - 8.0 % 0.9   Basophil %      0.0 - 1.9 % 1.0   Differential Method       Automated   Troponin I      0.000 - 0.026 ng/mL 0.017        Assessment:    1. Leukocytosis  2. Elevated hematocrit  3. Chronic smoker  4. H/o CVA  5. H/o STEMI  6. HTn, essential  7. Dyslipidemia  8. Type 2 DM not on long term insulin without complication      Plan:    1: She has chronic leukocytosis with elevated neutrophils. She has no pruritus or early satiety. She has no arthritis/arthralgias, rash, dysuria or vaginal discharge. No diarrhea, abdominal pain, ear ache or ear discharge. No s/s of sinusitis.  She is a current smoker and her leukocytosis is likely resulting from smoking.  She will be referred to smoking cessation program. She will have UA, CXR, CBC, CMP, LDH, uric acid, bcr abl p210 and p190, CRP checked today.  She will have a follow up in 6 months.     2. She is a chronic smoker. She will have blood CO, serum EPO level checked.    3. Assistance with smoking cessation was offered, including:  []  Medications  []  Counseling  []  Printed Information on Smoking Cessation  [x]  Referral to a Smoking Cessation Program    Patient was counseled regarding smoking for 3-10 minutes.      4,5,6,7, 8: She follows with , her PCP.          \

## 2020-11-02 NOTE — LETTER
November 2, 2020      Ghazala Valencia MD  [address]  [facility] [address]           [facility]  [address]  [facility] [address]  Phone: [phone]          Patient: Karen Mohan   MR Number: [id number]   YOB: 1955   Date of Visit: 11/2/2020       Dear Dr. Ghazala Valencia:    Thank you for referring Karen Mohan to me for evaluation. Attached you will find relevant portions of my assessment and plan of care.    If you have questions, please do not hesitate to call me. I look forward to following Karen Mohan along with you.    Sincerely,    Hali Edward MD    Enclosure  CC:  No Recipients    If you would like to receive this communication electronically, please contact [email or web address] or [phone] to request more information on [employer] Link access.    For providers and/or their staff who would like to refer a patient to [facility], please contact us through our one-stop-shop provider referral line, [facility] , at [phone].    If you feel you have received this communication in error or would no longer like to receive these types of communications, please e-mail [email or web address]

## 2020-11-27 ENCOUNTER — HOSPITAL ENCOUNTER (EMERGENCY)
Facility: HOSPITAL | Age: 65
Discharge: HOME | End: 2020-11-27
Attending: EMERGENCY MEDICINE
Payer: MEDICARE

## 2020-11-27 VITALS
DIASTOLIC BLOOD PRESSURE: 68 MMHG | RESPIRATION RATE: 18 BRPM | SYSTOLIC BLOOD PRESSURE: 137 MMHG | HEART RATE: 108 BPM | BODY MASS INDEX: 26.53 KG/M2 | TEMPERATURE: 98 F | OXYGEN SATURATION: 97 % | WEIGHT: 169 LBS | HEIGHT: 67 IN

## 2020-11-27 DIAGNOSIS — K08.89 PAIN, DENTAL: Primary | ICD-10-CM

## 2020-11-27 PROCEDURE — 99284 PR EMERGENCY DEPT VISIT,LEVEL IV: ICD-10-PCS | Mod: ,,, | Performed by: PHYSICIAN ASSISTANT

## 2020-11-27 PROCEDURE — 99284 EMERGENCY DEPT VISIT MOD MDM: CPT | Mod: ,,, | Performed by: PHYSICIAN ASSISTANT

## 2020-11-27 PROCEDURE — 99284 EMERGENCY DEPT VISIT MOD MDM: CPT

## 2020-11-27 PROCEDURE — 25000003 PHARM REV CODE 250: Performed by: PHYSICIAN ASSISTANT

## 2020-11-27 RX ORDER — CLINDAMYCIN HYDROCHLORIDE 150 MG/1
300 CAPSULE ORAL 4 TIMES DAILY
Qty: 56 CAPSULE | Refills: 0 | Status: SHIPPED | OUTPATIENT
Start: 2020-11-27 | End: 2020-12-04

## 2020-11-27 RX ORDER — TRAMADOL HYDROCHLORIDE 50 MG/1
50 TABLET ORAL EVERY 6 HOURS PRN
Qty: 12 TABLET | Refills: 0 | Status: SHIPPED | OUTPATIENT
Start: 2020-11-27 | End: 2021-05-05

## 2020-11-27 RX ORDER — ACETAMINOPHEN 500 MG
1000 TABLET ORAL
Status: COMPLETED | OUTPATIENT
Start: 2020-11-27 | End: 2020-11-27

## 2020-11-27 RX ORDER — CLINDAMYCIN HYDROCHLORIDE 150 MG/1
300 CAPSULE ORAL
Status: COMPLETED | OUTPATIENT
Start: 2020-11-27 | End: 2020-11-27

## 2020-11-27 RX ADMIN — CLINDAMYCIN HYDROCHLORIDE 300 MG: 150 CAPSULE ORAL at 11:11

## 2020-11-27 RX ADMIN — ACETAMINOPHEN 1000 MG: 500 TABLET ORAL at 11:11

## 2020-11-27 NOTE — ED TRIAGE NOTES
Patient comes into ER with complaints of dental pain for the past two days. Patient states that she believes it is an abscess.

## 2020-11-27 NOTE — DISCHARGE INSTRUCTIONS
Take antibiotics as prescribed. You can take tylenol in addition to prescribed medication. Follow up with a dentist as soon as possible.    If symptoms worsen, return to the ED.

## 2020-11-27 NOTE — ED PROVIDER NOTES
"Encounter Date: 11/27/2020       History     Chief Complaint   Patient presents with    Facial Swelling     r upper tooth hurting     64-year-old  female with history of diabetes, hypertension, MI, stroke presents to the ED complaining of dental pain x 2 days.  She reports that her pain and swelling has been progressively worsening and is now 9/10 "throbbing".  She has been taking over-the-counter aspirin for her symptoms with no relief.  She does not have a dentist that she regularly follows with.  She is not currently on any antibiotics and denies any bleeding or drainage to the area.  She does have multiple cracked teeth and dental caries that she knows about.  She does report a mild headache.  She denies fever, chills, chest pain, shortness breath, abdominal pain, nausea.    The history is provided by the patient.     Review of patient's allergies indicates:   Allergen Reactions    Ampicillin     Phenergan [promethazine]      Past Medical History:   Diagnosis Date    Diabetes mellitus     Hypercholesteremia     Hypertension     Miscarriage     Myocardial infarct x2    Stroke      Past Surgical History:   Procedure Laterality Date    CARDIAC SURGERY      cardiac stent placement    CORONARY STENT PLACEMENT      TUBAL LIGATION       Family History   Problem Relation Age of Onset    Heart disease Mother     Early death Mother     Diabetes Maternal Aunt      Social History     Tobacco Use    Smoking status: Current Every Day Smoker     Packs/day: 0.50     Years: 40.00     Pack years: 20.00    Smokeless tobacco: Never Used   Substance Use Topics    Alcohol use: Yes     Comment: occasionally    Drug use: No     Review of Systems   Constitutional: Negative for chills and fever.   HENT: Positive for dental problem and facial swelling. Negative for congestion, rhinorrhea and sore throat.    Eyes: Negative for photophobia and visual disturbance.   Respiratory: Negative for cough and " shortness of breath.    Cardiovascular: Negative for chest pain.   Gastrointestinal: Negative for abdominal pain, constipation, diarrhea, nausea and vomiting.   Genitourinary: Negative for dysuria and hematuria.   Musculoskeletal: Negative for back pain and myalgias.   Skin: Negative for rash.   Neurological: Positive for headaches. Negative for dizziness, weakness and numbness.   Psychiatric/Behavioral: Negative for confusion.       Physical Exam     Initial Vitals [11/27/20 1048]   BP Pulse Resp Temp SpO2   137/68 108 18 98.3 °F (36.8 °C) 97 %      MAP       --         Physical Exam    Nursing note and vitals reviewed.  Constitutional: She appears well-developed and well-nourished. She is not diaphoretic. No distress.   HENT:   Head: Normocephalic and atraumatic.   Mouth/Throat: No trismus in the jaw.   Swelling noted to the R cheek, no cellulitis. Multiple dental caries. No palpable dental abscess. No bleeding/drainage.    Neck: Normal range of motion. Neck supple.   Cardiovascular: Normal rate, regular rhythm and normal heart sounds. Exam reveals no gallop and no friction rub.    No murmur heard.  Pulmonary/Chest: Breath sounds normal. She has no wheezes. She has no rhonchi. She has no rales.   Abdominal: Soft. Bowel sounds are normal. There is no abdominal tenderness. There is no rebound and no guarding.   Musculoskeletal: Normal range of motion.   Neurological: She is alert and oriented to person, place, and time.   Skin: Skin is warm and dry. No rash noted. No erythema.   Psychiatric: She has a normal mood and affect.         ED Course   Procedures  Labs Reviewed - No data to display       Imaging Results    None          Medical Decision Making:   History:   Old Medical Records: I decided to obtain old medical records.       APC / Resident Notes:   64-year-old  female with history of diabetes, hypertension, MI, stroke presents to the ED complaining of dental pain x 2 days.  Vital signs  stable.  There is swelling noted to the right cheek without any cellulitis.  There is no palpable dental abscess noted with no bleeding or drainage appreciated.  Multiple dental caries.  No trismus.  She is well appearing, afebrile.  Will start on antibiotics.  Stable for discharge.    Given clindamycin in the ED.    She was discharged with prescriptions for clindamycin and tramadol.  She will follow up with a dentist - dental resources provided.  Strict ED return precautions given, she expressed understanding.  All of the patient's questions were answered.  I reviewed the patient's chart.                          Clinical Impression:       ICD-10-CM ICD-9-CM   1. Pain, dental  K08.89 525.9                      Disposition:   Disposition: Discharged  Condition: Stable     ED Disposition Condition    Discharge Stable        ED Prescriptions     Medication Sig Dispense Start Date End Date Auth. Provider    clindamycin (CLEOCIN) 150 MG capsule Take 2 capsules (300 mg total) by mouth 4 (four) times daily. for 7 days 56 capsule 11/27/2020 12/4/2020 Asia Jones PA-C    traMADoL (ULTRAM) 50 mg tablet Take 1 tablet (50 mg total) by mouth every 6 (six) hours as needed for Pain. 12 tablet 11/27/2020  Asia Jones PA-C        Follow-up Information     Follow up With Specialties Details Why Contact [facility] Dental [facility], Oral and Maxillofacial Surgery, Oral Surgery   [address] [facility] [address]  [phone]                                         Asia Jones PA-C  11/27/20 7258

## 2021-04-12 DIAGNOSIS — R71.8 HIGH HEMATOCRIT: Primary | ICD-10-CM

## 2021-04-12 DIAGNOSIS — D72.829 LEUKOCYTOSIS, UNSPECIFIED TYPE: ICD-10-CM

## 2021-05-05 ENCOUNTER — OFFICE VISIT (OUTPATIENT)
Dept: HEMATOLOGY/ONCOLOGY | Facility: CLINIC | Age: 66
End: 2021-05-05
Payer: MEDICARE

## 2021-05-05 ENCOUNTER — LAB VISIT (OUTPATIENT)
Dept: LAB | Facility: HOSPITAL | Age: 66
End: 2021-05-05
Payer: MEDICARE

## 2021-05-05 VITALS
HEIGHT: 67 IN | RESPIRATION RATE: 20 BRPM | TEMPERATURE: 98 F | DIASTOLIC BLOOD PRESSURE: 67 MMHG | OXYGEN SATURATION: 98 % | HEART RATE: 77 BPM | WEIGHT: 168.31 LBS | SYSTOLIC BLOOD PRESSURE: 140 MMHG | BODY MASS INDEX: 26.42 KG/M2

## 2021-05-05 DIAGNOSIS — D72.829 LEUKOCYTOSIS, UNSPECIFIED TYPE: Primary | ICD-10-CM

## 2021-05-05 DIAGNOSIS — Z86.73 H/O: CVA (CEREBROVASCULAR ACCIDENT): ICD-10-CM

## 2021-05-05 DIAGNOSIS — D72.829 LEUKOCYTOSIS, UNSPECIFIED TYPE: ICD-10-CM

## 2021-05-05 DIAGNOSIS — I25.2 HISTORY OF ST ELEVATION MYOCARDIAL INFARCTION (STEMI): ICD-10-CM

## 2021-05-05 DIAGNOSIS — I10 ESSENTIAL HYPERTENSION: ICD-10-CM

## 2021-05-05 DIAGNOSIS — E78.5 HYPERLIPIDEMIA, UNSPECIFIED HYPERLIPIDEMIA TYPE: ICD-10-CM

## 2021-05-05 DIAGNOSIS — E11.9 TYPE 2 DIABETES MELLITUS WITHOUT COMPLICATION, WITH LONG-TERM CURRENT USE OF INSULIN: ICD-10-CM

## 2021-05-05 DIAGNOSIS — Z72.0 TOBACCO ABUSE: ICD-10-CM

## 2021-05-05 DIAGNOSIS — Z79.4 TYPE 2 DIABETES MELLITUS WITHOUT COMPLICATION, WITH LONG-TERM CURRENT USE OF INSULIN: ICD-10-CM

## 2021-05-05 DIAGNOSIS — R71.8 HIGH HEMATOCRIT: ICD-10-CM

## 2021-05-05 LAB
BASOPHILS # BLD AUTO: 0.11 K/UL (ref 0–0.2)
BASOPHILS NFR BLD: 0.9 % (ref 0–1.9)
DIFFERENTIAL METHOD: ABNORMAL
EOSINOPHIL # BLD AUTO: 0.1 K/UL (ref 0–0.5)
EOSINOPHIL NFR BLD: 1.1 % (ref 0–8)
ERYTHROCYTE [DISTWIDTH] IN BLOOD BY AUTOMATED COUNT: 14.6 % (ref 11.5–14.5)
HCT VFR BLD AUTO: 48.1 % (ref 37–48.5)
HGB BLD-MCNC: 16.1 G/DL (ref 12–16)
IMM GRANULOCYTES # BLD AUTO: 0.05 K/UL (ref 0–0.04)
IMM GRANULOCYTES NFR BLD AUTO: 0.4 % (ref 0–0.5)
LDH SERPL L TO P-CCNC: 158 U/L (ref 110–260)
LYMPHOCYTES # BLD AUTO: 3.1 K/UL (ref 1–4.8)
LYMPHOCYTES NFR BLD: 26.3 % (ref 18–48)
MCH RBC QN AUTO: 31.2 PG (ref 27–31)
MCHC RBC AUTO-ENTMCNC: 33.5 G/DL (ref 32–36)
MCV RBC AUTO: 93 FL (ref 82–98)
MONOCYTES # BLD AUTO: 0.7 K/UL (ref 0.3–1)
MONOCYTES NFR BLD: 5.8 % (ref 4–15)
NEUTROPHILS # BLD AUTO: 7.8 K/UL (ref 1.8–7.7)
NEUTROPHILS NFR BLD: 65.5 % (ref 38–73)
NRBC BLD-RTO: 0 /100 WBC
PLATELET # BLD AUTO: 248 K/UL (ref 150–450)
PMV BLD AUTO: 11.1 FL (ref 9.2–12.9)
RBC # BLD AUTO: 5.16 M/UL (ref 4–5.4)
WBC # BLD AUTO: 11.89 K/UL (ref 3.9–12.7)

## 2021-05-05 PROCEDURE — 99999 PR PBB SHADOW E&M-EST. PATIENT-LVL IV: CPT | Mod: PBBFAC,,, | Performed by: NURSE PRACTITIONER

## 2021-05-05 PROCEDURE — 85025 COMPLETE CBC W/AUTO DIFF WBC: CPT | Performed by: INTERNAL MEDICINE

## 2021-05-05 PROCEDURE — 1126F PR PAIN SEVERITY QUANTIFIED, NO PAIN PRESENT: ICD-10-PCS | Mod: S$GLB,,, | Performed by: NURSE PRACTITIONER

## 2021-05-05 PROCEDURE — 1126F AMNT PAIN NOTED NONE PRSNT: CPT | Mod: S$GLB,,, | Performed by: NURSE PRACTITIONER

## 2021-05-05 PROCEDURE — 99214 PR OFFICE/OUTPT VISIT, EST, LEVL IV, 30-39 MIN: ICD-10-PCS | Mod: S$GLB,,, | Performed by: NURSE PRACTITIONER

## 2021-05-05 PROCEDURE — 3008F PR BODY MASS INDEX (BMI) DOCUMENTED: ICD-10-PCS | Mod: CPTII,S$GLB,, | Performed by: NURSE PRACTITIONER

## 2021-05-05 PROCEDURE — 83615 LACTATE (LD) (LDH) ENZYME: CPT | Performed by: INTERNAL MEDICINE

## 2021-05-05 PROCEDURE — 99999 PR PBB SHADOW E&M-EST. PATIENT-LVL IV: ICD-10-PCS | Mod: PBBFAC,,, | Performed by: NURSE PRACTITIONER

## 2021-05-05 PROCEDURE — 3008F BODY MASS INDEX DOCD: CPT | Mod: CPTII,S$GLB,, | Performed by: NURSE PRACTITIONER

## 2021-05-05 PROCEDURE — 36415 COLL VENOUS BLD VENIPUNCTURE: CPT | Performed by: INTERNAL MEDICINE

## 2021-05-05 PROCEDURE — 99214 OFFICE O/P EST MOD 30 MIN: CPT | Mod: S$GLB,,, | Performed by: NURSE PRACTITIONER

## 2021-06-16 DIAGNOSIS — R29.898 LEFT LEG WEAKNESS: Primary | ICD-10-CM

## 2021-06-23 ENCOUNTER — HOSPITAL ENCOUNTER (OUTPATIENT)
Dept: RADIOLOGY | Facility: HOSPITAL | Age: 66
Discharge: HOME | End: 2021-06-23
Attending: FAMILY MEDICINE
Payer: MEDICARE

## 2021-06-23 DIAGNOSIS — R29.898 LEFT LEG WEAKNESS: ICD-10-CM

## 2021-06-23 PROCEDURE — 93926 US ARTERIAL LOWER EXTREMITY LEFT WITH ABI (XPD): ICD-10-PCS | Mod: 26,,, | Performed by: INTERNAL MEDICINE

## 2021-06-23 PROCEDURE — 93926 LOWER EXTREMITY STUDY: CPT | Mod: 26,,, | Performed by: INTERNAL MEDICINE

## 2021-06-23 PROCEDURE — 93922 US ARTERIAL LOWER EXTREMITY LEFT WITH ABI (XPD): ICD-10-PCS | Mod: 26,,, | Performed by: INTERNAL MEDICINE

## 2021-06-23 PROCEDURE — 93922 UPR/L XTREMITY ART 2 LEVELS: CPT | Mod: TC

## 2021-06-23 PROCEDURE — 93922 UPR/L XTREMITY ART 2 LEVELS: CPT | Mod: 26,,, | Performed by: INTERNAL MEDICINE

## 2021-06-27 ENCOUNTER — HOSPITAL ENCOUNTER (EMERGENCY)
Facility: HOSPITAL | Age: 66
Discharge: HOME | End: 2021-06-27
Attending: EMERGENCY MEDICINE
Payer: MEDICARE

## 2021-06-27 VITALS
OXYGEN SATURATION: 98 % | SYSTOLIC BLOOD PRESSURE: 146 MMHG | TEMPERATURE: 99 F | DIASTOLIC BLOOD PRESSURE: 99 MMHG | HEIGHT: 67 IN | HEART RATE: 85 BPM | WEIGHT: 165 LBS | RESPIRATION RATE: 18 BRPM | BODY MASS INDEX: 25.9 KG/M2

## 2021-06-27 DIAGNOSIS — M25.561 POSTERIOR KNEE PAIN, RIGHT: Primary | ICD-10-CM

## 2021-06-27 DIAGNOSIS — M25.571 RIGHT ANKLE PAIN: ICD-10-CM

## 2021-06-27 PROCEDURE — 25000003 PHARM REV CODE 250: Performed by: NURSE PRACTITIONER

## 2021-06-27 PROCEDURE — 99282 EMERGENCY DEPT VISIT SF MDM: CPT | Mod: ,,, | Performed by: EMERGENCY MEDICINE

## 2021-06-27 PROCEDURE — 99284 EMERGENCY DEPT VISIT MOD MDM: CPT | Mod: 25

## 2021-06-27 PROCEDURE — 99282 PR EMERGENCY DEPT VISIT,LEVEL II: ICD-10-PCS | Mod: ,,, | Performed by: EMERGENCY MEDICINE

## 2021-06-27 RX ORDER — ACETAMINOPHEN 325 MG/1
650 TABLET ORAL
Status: COMPLETED | OUTPATIENT
Start: 2021-06-27 | End: 2021-06-27

## 2021-06-27 RX ADMIN — ACETAMINOPHEN 650 MG: 325 TABLET ORAL at 06:06

## 2021-08-06 DIAGNOSIS — I73.9 CLAUDICATION: Primary | ICD-10-CM

## 2021-08-16 ENCOUNTER — HOSPITAL ENCOUNTER (EMERGENCY)
Facility: HOSPITAL | Age: 66
Discharge: HOME | End: 2021-08-16
Attending: EMERGENCY MEDICINE
Payer: MEDICARE

## 2021-08-16 VITALS
TEMPERATURE: 98 F | HEART RATE: 100 BPM | DIASTOLIC BLOOD PRESSURE: 93 MMHG | WEIGHT: 160 LBS | RESPIRATION RATE: 18 BRPM | BODY MASS INDEX: 25.11 KG/M2 | HEIGHT: 67 IN | OXYGEN SATURATION: 97 % | SYSTOLIC BLOOD PRESSURE: 182 MMHG

## 2021-08-16 DIAGNOSIS — R31.9 URINARY TRACT INFECTION WITH HEMATURIA, SITE UNSPECIFIED: Primary | ICD-10-CM

## 2021-08-16 DIAGNOSIS — N39.0 URINARY TRACT INFECTION WITH HEMATURIA, SITE UNSPECIFIED: Primary | ICD-10-CM

## 2021-08-16 LAB
BACTERIA #/AREA URNS AUTO: ABNORMAL /HPF
BILIRUB UR QL STRIP: NEGATIVE
BUN SERPL-MCNC: 11 MG/DL (ref 6–30)
CHLORIDE SERPL-SCNC: 101 MMOL/L (ref 95–110)
CLARITY UR REFRACT.AUTO: ABNORMAL
COLOR UR AUTO: YELLOW
CREAT SERPL-MCNC: 0.6 MG/DL (ref 0.5–1.4)
CTP QC/QA: YES
GLUCOSE SERPL-MCNC: 202 MG/DL (ref 70–110)
GLUCOSE UR QL STRIP: ABNORMAL
HCT VFR BLD CALC: 53 %PCV (ref 36–54)
HGB UR QL STRIP: ABNORMAL
HYALINE CASTS UR QL AUTO: 3 /LPF
KETONES UR QL STRIP: NEGATIVE
LEUKOCYTE ESTERASE UR QL STRIP: ABNORMAL
MICROSCOPIC COMMENT: ABNORMAL
NITRITE UR QL STRIP: POSITIVE
PH UR STRIP: 5 [PH] (ref 5–8)
POC IONIZED CALCIUM: 1.08 MMOL/L (ref 1.06–1.42)
POC TCO2 (MEASURED): 25 MMOL/L (ref 23–29)
POTASSIUM BLD-SCNC: 3.4 MMOL/L (ref 3.5–5.1)
PROT UR QL STRIP: ABNORMAL
RBC #/AREA URNS AUTO: 2 /HPF (ref 0–4)
SAMPLE: ABNORMAL
SARS-COV-2 RDRP RESP QL NAA+PROBE: NEGATIVE
SODIUM BLD-SCNC: 140 MMOL/L (ref 136–145)
SP GR UR STRIP: 1.02 (ref 1–1.03)
SQUAMOUS #/AREA URNS AUTO: 2 /HPF
URN SPEC COLLECT METH UR: ABNORMAL
WBC #/AREA URNS AUTO: 17 /HPF (ref 0–5)

## 2021-08-16 PROCEDURE — 87086 URINE CULTURE/COLONY COUNT: CPT | Performed by: PHYSICIAN ASSISTANT

## 2021-08-16 PROCEDURE — 99283 PR EMERGENCY DEPT VISIT,LEVEL III: ICD-10-PCS | Mod: CR,CS,, | Performed by: PHYSICIAN ASSISTANT

## 2021-08-16 PROCEDURE — 81001 URINALYSIS AUTO W/SCOPE: CPT | Performed by: PHYSICIAN ASSISTANT

## 2021-08-16 PROCEDURE — 87077 CULTURE AEROBIC IDENTIFY: CPT | Performed by: PHYSICIAN ASSISTANT

## 2021-08-16 PROCEDURE — 99283 EMERGENCY DEPT VISIT LOW MDM: CPT | Mod: 25

## 2021-08-16 PROCEDURE — U0002 COVID-19 LAB TEST NON-CDC: HCPCS | Performed by: EMERGENCY MEDICINE

## 2021-08-16 PROCEDURE — 80047 BASIC METABLC PNL IONIZED CA: CPT

## 2021-08-16 PROCEDURE — 87186 SC STD MICRODIL/AGAR DIL: CPT | Performed by: PHYSICIAN ASSISTANT

## 2021-08-16 PROCEDURE — 87088 URINE BACTERIA CULTURE: CPT | Performed by: PHYSICIAN ASSISTANT

## 2021-08-16 PROCEDURE — 99283 EMERGENCY DEPT VISIT LOW MDM: CPT | Mod: CR,CS,, | Performed by: PHYSICIAN ASSISTANT

## 2021-08-16 RX ORDER — CEPHALEXIN 500 MG/1
500 CAPSULE ORAL EVERY 12 HOURS
Qty: 14 CAPSULE | Refills: 0 | Status: SHIPPED | OUTPATIENT
Start: 2021-08-16 | End: 2021-08-23

## 2021-08-19 LAB — BACTERIA UR CULT: ABNORMAL

## 2021-11-02 ENCOUNTER — LAB VISIT (OUTPATIENT)
Dept: LAB | Facility: HOSPITAL | Age: 66
End: 2021-11-02
Payer: MEDICARE

## 2021-11-02 ENCOUNTER — OFFICE VISIT (OUTPATIENT)
Dept: HEMATOLOGY/ONCOLOGY | Facility: CLINIC | Age: 66
End: 2021-11-02
Payer: MEDICARE

## 2021-11-02 VITALS
RESPIRATION RATE: 16 BRPM | TEMPERATURE: 98 F | HEART RATE: 65 BPM | BODY MASS INDEX: 23.91 KG/M2 | OXYGEN SATURATION: 97 % | WEIGHT: 152.31 LBS | DIASTOLIC BLOOD PRESSURE: 74 MMHG | HEIGHT: 67 IN | SYSTOLIC BLOOD PRESSURE: 157 MMHG

## 2021-11-02 DIAGNOSIS — Z79.4 TYPE 2 DIABETES MELLITUS WITHOUT COMPLICATION, WITH LONG-TERM CURRENT USE OF INSULIN: Chronic | ICD-10-CM

## 2021-11-02 DIAGNOSIS — I66.9 CEREBRAL EMBOLISM: Primary | ICD-10-CM

## 2021-11-02 DIAGNOSIS — E66.01 MORBID OBESITY DUE TO EXCESS CALORIES: ICD-10-CM

## 2021-11-02 DIAGNOSIS — E78.5 HYPERLIPIDEMIA, UNSPECIFIED HYPERLIPIDEMIA TYPE: Chronic | ICD-10-CM

## 2021-11-02 DIAGNOSIS — Z72.0 TOBACCO ABUSE: ICD-10-CM

## 2021-11-02 DIAGNOSIS — D72.829 LEUKOCYTOSIS, UNSPECIFIED TYPE: ICD-10-CM

## 2021-11-02 DIAGNOSIS — I63.9 CEREBROVASCULAR ACCIDENT (CVA), UNSPECIFIED MECHANISM: ICD-10-CM

## 2021-11-02 DIAGNOSIS — D72.823 LEUKEMOID REACTION: ICD-10-CM

## 2021-11-02 DIAGNOSIS — R71.8 HIGH HEMATOCRIT: ICD-10-CM

## 2021-11-02 DIAGNOSIS — E11.9 TYPE 2 DIABETES MELLITUS WITHOUT COMPLICATION, WITH LONG-TERM CURRENT USE OF INSULIN: Chronic | ICD-10-CM

## 2021-11-02 LAB
BASOPHILS # BLD AUTO: 0.11 K/UL (ref 0–0.2)
BASOPHILS NFR BLD: 0.8 % (ref 0–1.9)
DIFFERENTIAL METHOD: ABNORMAL
EOSINOPHIL # BLD AUTO: 0.1 K/UL (ref 0–0.5)
EOSINOPHIL NFR BLD: 1 % (ref 0–8)
ERYTHROCYTE [DISTWIDTH] IN BLOOD BY AUTOMATED COUNT: 14.1 % (ref 11.5–14.5)
HCT VFR BLD AUTO: 47.6 % (ref 37–48.5)
HGB BLD-MCNC: 16.1 G/DL (ref 12–16)
IMM GRANULOCYTES # BLD AUTO: 0.06 K/UL (ref 0–0.04)
IMM GRANULOCYTES NFR BLD AUTO: 0.4 % (ref 0–0.5)
LDH SERPL L TO P-CCNC: 158 U/L (ref 110–260)
LYMPHOCYTES # BLD AUTO: 2.9 K/UL (ref 1–4.8)
LYMPHOCYTES NFR BLD: 20.4 % (ref 18–48)
MCH RBC QN AUTO: 32.1 PG (ref 27–31)
MCHC RBC AUTO-ENTMCNC: 33.8 G/DL (ref 32–36)
MCV RBC AUTO: 95 FL (ref 82–98)
MONOCYTES # BLD AUTO: 0.7 K/UL (ref 0.3–1)
MONOCYTES NFR BLD: 5 % (ref 4–15)
NEUTROPHILS # BLD AUTO: 10.3 K/UL (ref 1.8–7.7)
NEUTROPHILS NFR BLD: 72.4 % (ref 38–73)
NRBC BLD-RTO: 0 /100 WBC
PLATELET # BLD AUTO: 226 K/UL (ref 150–450)
PMV BLD AUTO: 10.1 FL (ref 9.2–12.9)
RBC # BLD AUTO: 5.01 M/UL (ref 4–5.4)
WBC # BLD AUTO: 14.19 K/UL (ref 3.9–12.7)

## 2021-11-02 PROCEDURE — 83615 LACTATE (LD) (LDH) ENZYME: CPT | Performed by: INTERNAL MEDICINE

## 2021-11-02 PROCEDURE — 99999 PR PBB SHADOW E&M-EST. PATIENT-LVL III: CPT | Mod: PBBFAC,,, | Performed by: INTERNAL MEDICINE

## 2021-11-02 PROCEDURE — 36415 COLL VENOUS BLD VENIPUNCTURE: CPT | Performed by: INTERNAL MEDICINE

## 2021-11-02 PROCEDURE — 99999 PR PBB SHADOW E&M-EST. PATIENT-LVL III: ICD-10-PCS | Mod: PBBFAC,,, | Performed by: INTERNAL MEDICINE

## 2021-11-02 PROCEDURE — 99213 OFFICE O/P EST LOW 20 MIN: CPT | Mod: PBBFAC | Performed by: INTERNAL MEDICINE

## 2021-11-02 PROCEDURE — 99215 OFFICE O/P EST HI 40 MIN: CPT | Mod: S$GLB,,, | Performed by: INTERNAL MEDICINE

## 2021-11-02 PROCEDURE — 99215 PR OFFICE/OUTPT VISIT, EST, LEVL V, 40-54 MIN: ICD-10-PCS | Mod: S$GLB,,, | Performed by: INTERNAL MEDICINE

## 2021-11-02 PROCEDURE — 85025 COMPLETE CBC W/AUTO DIFF WBC: CPT | Performed by: INTERNAL MEDICINE

## 2021-11-02 RX ORDER — KETOROLAC TROMETHAMINE 4 MG/ML
SOLUTION/ DROPS OPHTHALMIC
COMMUNITY
Start: 2021-10-15

## 2021-11-02 RX ORDER — PREDNISOLONE ACETATE 10 MG/ML
SUSPENSION/ DROPS OPHTHALMIC
COMMUNITY
Start: 2021-10-26

## 2021-11-02 RX ORDER — ACETAMINOPHEN AND CODEINE PHOSPHATE 300; 60 MG/1; MG/1
TABLET ORAL
COMMUNITY
Start: 2021-06-08

## 2021-11-02 RX ORDER — CALCIUM CITRATE/VITAMIN D3 200MG-6.25
TABLET ORAL
COMMUNITY
Start: 2021-09-09

## 2021-11-02 RX ORDER — PEN NEEDLE, DIABETIC 29 G X1/2"
NEEDLE, DISPOSABLE MISCELLANEOUS
COMMUNITY
Start: 2021-10-16

## 2022-08-21 ENCOUNTER — HOSPITAL ENCOUNTER (EMERGENCY)
Facility: HOSPITAL | Age: 67
Discharge: HOME | End: 2022-08-21
Attending: EMERGENCY MEDICINE
Payer: MEDICARE

## 2022-08-21 VITALS
OXYGEN SATURATION: 99 % | HEIGHT: 67 IN | RESPIRATION RATE: 16 BRPM | HEART RATE: 98 BPM | WEIGHT: 147 LBS | SYSTOLIC BLOOD PRESSURE: 140 MMHG | TEMPERATURE: 99 F | BODY MASS INDEX: 23.07 KG/M2 | DIASTOLIC BLOOD PRESSURE: 71 MMHG

## 2022-08-21 DIAGNOSIS — R68.83 CHILLS: ICD-10-CM

## 2022-08-21 DIAGNOSIS — R73.9 HYPERGLYCEMIA: Primary | ICD-10-CM

## 2022-08-21 LAB
ANION GAP SERPL CALC-SCNC: 7 MMOL/L (ref 8–16)
BACTERIA #/AREA URNS AUTO: NORMAL /HPF
BASOPHILS # BLD AUTO: 0.12 K/UL (ref 0–0.2)
BASOPHILS NFR BLD: 1.1 % (ref 0–1.9)
BILIRUB UR QL STRIP: NEGATIVE
BUN SERPL-MCNC: 13 MG/DL (ref 8–23)
CALCIUM SERPL-MCNC: 9.6 MG/DL (ref 8.7–10.5)
CHLORIDE SERPL-SCNC: 105 MMOL/L (ref 95–110)
CLARITY UR REFRACT.AUTO: CLEAR
CO2 SERPL-SCNC: 26 MMOL/L (ref 23–29)
COLOR UR AUTO: COLORLESS
CREAT SERPL-MCNC: 0.8 MG/DL (ref 0.5–1.4)
DIFFERENTIAL METHOD: ABNORMAL
EOSINOPHIL # BLD AUTO: 0.1 K/UL (ref 0–0.5)
EOSINOPHIL NFR BLD: 0.8 % (ref 0–8)
ERYTHROCYTE [DISTWIDTH] IN BLOOD BY AUTOMATED COUNT: 13.3 % (ref 11.5–14.5)
EST. GFR  (NO RACE VARIABLE): >60 ML/MIN/1.73 M^2
GLUCOSE SERPL-MCNC: 221 MG/DL (ref 70–110)
GLUCOSE UR QL STRIP: ABNORMAL
HCT VFR BLD AUTO: 48.2 % (ref 37–48.5)
HGB BLD-MCNC: 16.6 G/DL (ref 12–16)
HGB UR QL STRIP: NEGATIVE
IMM GRANULOCYTES # BLD AUTO: 0.05 K/UL (ref 0–0.04)
IMM GRANULOCYTES NFR BLD AUTO: 0.4 % (ref 0–0.5)
KETONES UR QL STRIP: NEGATIVE
LEUKOCYTE ESTERASE UR QL STRIP: NEGATIVE
LYMPHOCYTES # BLD AUTO: 3.2 K/UL (ref 1–4.8)
LYMPHOCYTES NFR BLD: 28.6 % (ref 18–48)
MCH RBC QN AUTO: 32.7 PG (ref 27–31)
MCHC RBC AUTO-ENTMCNC: 34.4 G/DL (ref 32–36)
MCV RBC AUTO: 95 FL (ref 82–98)
MICROSCOPIC COMMENT: NORMAL
MONOCYTES # BLD AUTO: 0.6 K/UL (ref 0.3–1)
MONOCYTES NFR BLD: 5.6 % (ref 4–15)
NEUTROPHILS # BLD AUTO: 7.1 K/UL (ref 1.8–7.7)
NEUTROPHILS NFR BLD: 63.5 % (ref 38–73)
NITRITE UR QL STRIP: NEGATIVE
NRBC BLD-RTO: 0 /100 WBC
PH UR STRIP: 7 [PH] (ref 5–8)
PLATELET # BLD AUTO: 233 K/UL (ref 150–450)
PMV BLD AUTO: 10.4 FL (ref 9.2–12.9)
POTASSIUM SERPL-SCNC: 4.4 MMOL/L (ref 3.5–5.1)
PROT UR QL STRIP: NEGATIVE
RBC # BLD AUTO: 5.08 M/UL (ref 4–5.4)
RBC #/AREA URNS AUTO: 1 /HPF (ref 0–4)
SARS-COV-2 RDRP RESP QL NAA+PROBE: NEGATIVE
SODIUM SERPL-SCNC: 138 MMOL/L (ref 136–145)
SP GR UR STRIP: 1 (ref 1–1.03)
SQUAMOUS #/AREA URNS AUTO: 3 /HPF
URN SPEC COLLECT METH UR: ABNORMAL
WBC # BLD AUTO: 11.25 K/UL (ref 3.9–12.7)
WBC #/AREA URNS AUTO: 1 /HPF (ref 0–5)
YEAST UR QL AUTO: NORMAL

## 2022-08-21 PROCEDURE — 99284 EMERGENCY DEPT VISIT MOD MDM: CPT | Mod: 25

## 2022-08-21 PROCEDURE — 93005 ELECTROCARDIOGRAM TRACING: CPT

## 2022-08-21 PROCEDURE — U0002 COVID-19 LAB TEST NON-CDC: HCPCS | Performed by: PHYSICIAN ASSISTANT

## 2022-08-21 PROCEDURE — 86803 HEPATITIS C AB TEST: CPT | Performed by: PHYSICIAN ASSISTANT

## 2022-08-21 PROCEDURE — 87389 HIV-1 AG W/HIV-1&-2 AB AG IA: CPT | Performed by: PHYSICIAN ASSISTANT

## 2022-08-21 PROCEDURE — 25000003 PHARM REV CODE 250: Performed by: PHYSICIAN ASSISTANT

## 2022-08-21 PROCEDURE — 80048 BASIC METABOLIC PNL TOTAL CA: CPT | Performed by: PHYSICIAN ASSISTANT

## 2022-08-21 PROCEDURE — 81001 URINALYSIS AUTO W/SCOPE: CPT | Performed by: PHYSICIAN ASSISTANT

## 2022-08-21 PROCEDURE — 99284 PR EMERGENCY DEPT VISIT,LEVEL IV: ICD-10-PCS | Mod: CS,,, | Performed by: PHYSICIAN ASSISTANT

## 2022-08-21 PROCEDURE — 80048 BASIC METABOLIC PNL TOTAL CA: CPT

## 2022-08-21 PROCEDURE — 93010 ELECTROCARDIOGRAM REPORT: CPT | Mod: ,,, | Performed by: INTERNAL MEDICINE

## 2022-08-21 PROCEDURE — 93010 EKG 12-LEAD: ICD-10-PCS | Mod: ,,, | Performed by: INTERNAL MEDICINE

## 2022-08-21 PROCEDURE — 96360 HYDRATION IV INFUSION INIT: CPT

## 2022-08-21 PROCEDURE — 85025 COMPLETE CBC W/AUTO DIFF WBC: CPT | Performed by: PHYSICIAN ASSISTANT

## 2022-08-21 PROCEDURE — 99284 EMERGENCY DEPT VISIT MOD MDM: CPT | Mod: CS,,, | Performed by: PHYSICIAN ASSISTANT

## 2022-08-21 RX ADMIN — SODIUM CHLORIDE 500 ML: 0.9 INJECTION, SOLUTION INTRAVENOUS at 09:08

## 2022-08-22 LAB
HCV AB SERPL QL IA: NEGATIVE
HIV 1+2 AB+HIV1 P24 AG SERPL QL IA: NEGATIVE

## 2022-08-22 NOTE — DISCHARGE INSTRUCTIONS
Your blood sugar is slightly elevated today.  Please take all of your diabetes medications as prescribed.  Your urinalysis does not show any signs of infection.  Your COVID swab is negative.  Follow-up closely with your primary doctor or return to the emergency department sooner for any new or worsening symptoms.

## 2022-08-22 NOTE — ED TRIAGE NOTES
"Karen Mohan, a 66 y.o. female presents to the ED w/ complaint of possible UTI. Pt c/o "shakes" and "not really myself".     Pt reports excessive sleeping and "somewhat" dizzy.     Pt's granddaughter is positive for covid as of today.     Triage note:  Chief Complaint   Patient presents with    Chills     Last time had uti, no symptoms now     Review of patient's allergies indicates:   Allergen Reactions    Promethazine Anxiety    Ampicillin     Empagliflozin Nausea And Vomiting     Past Medical History:   Diagnosis Date    Diabetes mellitus     Hypercholesteremia     Hypertension     Miscarriage     Myocardial infarct x2    Stroke        "

## 2022-08-22 NOTE — ED PROVIDER NOTES
Encounter Date: 8/21/2022       History     Chief Complaint   Patient presents with    Chills     Last time had uti, no symptoms now     66-year-old female with past medical history of DM, hypertension, hyperlipidemia, MI, CVA presents to the emergency department with chief complaint of chills that began last night.  She has no other associated symptoms.  She denies rigors, fever, headache, congestion, sore throat, chest pain, shortness of breath, abdominal pain, nausea, vomiting, diarrhea, dysuria, hematuria.  She states that the last time that she had chills, it was due to a urinary tract infection.  She denies any urinary complaints currently.  She does report that she just found out her granddaughter tested positive for COVID. She is vaccinated.  No medication prior to arrival.  She denies other worsening or alleviating factors.        Review of patient's allergies indicates:   Allergen Reactions    Promethazine Anxiety    Ampicillin     Empagliflozin Nausea And Vomiting     Past Medical History:   Diagnosis Date    Diabetes mellitus     Hypercholesteremia     Hypertension     Miscarriage     Myocardial infarct x2    Stroke      Past Surgical History:   Procedure Laterality Date    CARDIAC SURGERY      cardiac stent placement    CORONARY STENT PLACEMENT      TUBAL LIGATION       Family History   Problem Relation Age of Onset    Heart disease Mother     Early death Mother     Diabetes Maternal Aunt      Social History     Tobacco Use    Smoking status: Current Every Day Smoker     Packs/day: 0.50     Years: 40.00     Pack years: 20.00    Smokeless tobacco: Never Used   Substance Use Topics    Alcohol use: Yes     Comment: occasionally    Drug use: No     Review of Systems   Constitutional: Positive for chills. Negative for fever.   HENT: Negative for sore throat.    Respiratory: Negative for shortness of breath.    Cardiovascular: Negative for chest pain.   Gastrointestinal: Negative for  nausea.   Genitourinary: Negative for dysuria.   Musculoskeletal: Negative for back pain.   Skin: Negative for rash.   Neurological: Negative for weakness.   Hematological: Does not bruise/bleed easily.       Physical Exam     Initial Vitals [08/21/22 1855]   BP Pulse Resp Temp SpO2   (!) 140/71 98 16 98.9 °F (37.2 °C) 99 %      MAP       --         Physical Exam    Nursing note and vitals reviewed.  Constitutional: She appears well-developed and well-nourished. She is not diaphoretic. No distress.   HENT:   Head: Normocephalic and atraumatic.   Mouth/Throat: Oropharynx is clear and moist.   Eyes: Conjunctivae and EOM are normal. Pupils are equal, round, and reactive to light.   Neck: Neck supple.   Normal range of motion.  Cardiovascular: Normal rate, regular rhythm, normal heart sounds and intact distal pulses. Exam reveals no gallop and no friction rub.    No murmur heard.  Pulmonary/Chest: Breath sounds normal. She has no wheezes. She has no rhonchi. She has no rales.   Abdominal: Abdomen is soft. Bowel sounds are normal. There is no abdominal tenderness.   Musculoskeletal:         General: Normal range of motion.      Cervical back: Normal range of motion and neck supple.     Neurological: She is alert and oriented to person, place, and time. She has normal strength. No cranial nerve deficit or sensory deficit. GCS score is 15. GCS eye subscore is 4. GCS verbal subscore is 5. GCS motor subscore is 6.   Skin: Skin is warm and dry. Capillary refill takes less than 2 seconds.   Psychiatric: She has a normal mood and affect. Her behavior is normal. Judgment and thought content normal.         ED Course   Procedures  Labs Reviewed   CBC W/ AUTO DIFFERENTIAL - Abnormal; Notable for the following components:       Result Value    Hemoglobin 16.6 (*)     MCH 32.7 (*)     Immature Grans (Abs) 0.05 (*)     All other components within normal limits   BASIC METABOLIC PANEL - Abnormal; Notable for the following components:     Glucose 221 (*)     Anion Gap 7 (*)     All other components within normal limits   URINALYSIS, REFLEX TO URINE CULTURE - Abnormal; Notable for the following components:    Color, UA Colorless (*)     Specific [place], UA 1.000 (*)     Glucose, UA 3+ (*)     All other components within normal limits    Narrative:     Specimen Source->Urine   SARS-COV-2 RNA AMPLIFICATION, QUAL   URINALYSIS MICROSCOPIC    Narrative:     Specimen Source->Urine   HIV 1 / 2 ANTIBODY   HEPATITIS C ANTIBODY     EKG Readings: (Independently Interpreted)   Initial Reading: No STEMI. Rhythm: Normal Sinus Rhythm. Heart Rate: 72.       Imaging Results    None          Medications   sodium chloride 0.9% bolus 500 mL (0 mLs Intravenous Stopped 8/21/22 2227)     Medical Decision Making:   History:   Old Medical Records: I decided to obtain old medical records.  Initial Assessment:   Emergent evaluation of a 66-year-old female who presents to the emergency department with chief complaint of chills that began yesterday.  She has no other complaints.  She does endorse recent COVID exposure.  Patient is afebrile, hemodynamically stable, nontoxic appearing.  Will order labs, urinalysis, COVID swab.  Differential Diagnosis:   DDx includes but is not limited to COVID, UTI, viral syndrome, metabolic derangement.   Independently Interpreted Test(s):   I have ordered and independently interpreted EKG Reading(s) - see prior notes  Clinical Tests:   Lab Tests: Ordered and Reviewed  Medical Tests: Ordered and Reviewed  ED Management:  No severe hematologic derangements.   Hyperglycemia 221. Will administer fluids.   UA without infection.   COVID swab negative.   Patient advised to remain compliant with all diabetes medications and adhere to a strict diet.  She is advised that she still may develop COVID, despite a negative test today.  Recommend close outpatient follow-up.  Return precautions given.  All questions answered.  The patient was instructed to  follow up with a primary care provider or to return to the emergency department for worsening symptoms. The treatment plan was discussed with the patient who demonstrated understanding and comfort with plan.              ED Course as of 08/21/22 2325   Sun Aug 21, 2022   2100 WBC: 11.25 [JM]   2100 Hemoglobin(!): 16.6 [JM]   2100 Hematocrit: 48.2 [JM]   2100 Platelets: 233 [JM]   2135 SARS-CoV-2 RNA, Amplification, Qual: Negative [JM]   2135 Glucose(!): 221 [JM]   2135 BUN: 13 [JM]   2135 Creatinine: 0.8 [JM]      ED Course User Index  [JM] Obdulia Frances PA-C             Clinical Impression:   Final diagnoses:  [R68.83] Chills  [R73.9] Hyperglycemia (Primary)          ED Disposition Condition    Discharge Stable        ED Prescriptions     None        Follow-up Information     Follow up With Specialties Details Why Contact Info    [facility] - Emergency Dept Emergency Medicine Go to  If symptoms worsen [address]  [facility] [id number] [phone]    Ghazala Valencia MD Family Medicine Schedule an appointment as soon as possible for a visit in 1 week  [address] [facility] [address]  [phone]             Obdulia Frances PA-C  08/21/22 2328

## 2023-12-07 ENCOUNTER — HOSPITAL ENCOUNTER (EMERGENCY)
Facility: HOSPITAL | Age: 68
Discharge: HOME | End: 2023-12-07
Attending: EMERGENCY MEDICINE
Payer: MEDICARE

## 2023-12-07 VITALS
RESPIRATION RATE: 16 BRPM | HEART RATE: 84 BPM | WEIGHT: 138 LBS | HEIGHT: 67 IN | TEMPERATURE: 98 F | OXYGEN SATURATION: 97 % | BODY MASS INDEX: 21.66 KG/M2 | DIASTOLIC BLOOD PRESSURE: 82 MMHG | SYSTOLIC BLOOD PRESSURE: 159 MMHG

## 2023-12-07 DIAGNOSIS — I73.9 PVD (PERIPHERAL VASCULAR DISEASE): ICD-10-CM

## 2023-12-07 DIAGNOSIS — F17.200 TOBACCO DEPENDENCY: ICD-10-CM

## 2023-12-07 DIAGNOSIS — M79.604 RIGHT LEG PAIN: Primary | ICD-10-CM

## 2023-12-07 PROCEDURE — 99281 EMR DPT VST MAYX REQ PHY/QHP: CPT

## 2023-12-07 NOTE — ED NOTES
Patient states pain to right buttock radiating down leg to mid back knee, denies injury, no OTC meds

## 2023-12-07 NOTE — DISCHARGE INSTRUCTIONS
Take acetaminophen (Tylenol), ibuprofen (Motrin, Advil) or naproxen (Naprosyn, Aleve) over-the-counter for pain as needed.  Please strictly follow all instructions on the packaging and do not take more medication per dose and per day than the instructions recommend.    Stop smoking

## 2023-12-07 NOTE — ED PROVIDER NOTES
Chief Complaint   Leg Pain (R hip down to foot feels numb but pain in buttocks)      History Of Present Illness   Karen Mohan is a 67 y.o. female presenting with right buttock and upper posterior thigh pain that has been present for a few days.  It is worse when she stands up and occasionally it is severe and makes her leg give out.  It does not really radiate down the leg but she did have some numbness in her foot a couple of days ago.  No back pain, fever, chills.  Normal urination and bowel movements.      Review of patient's allergies indicates:   Allergen Reactions    Promethazine Anxiety    Ampicillin     Empagliflozin Nausea And Vomiting       No current facility-administered medications on file prior to encounter.     Current Outpatient Medications on File Prior to Encounter   Medication Sig Dispense Refill    glimepiride (AMARYL) 4 MG tablet Take 4 mg by mouth before breakfast.      insulin glargine (LANTUS) 100 unit/mL injection Inject 36 Units into the skin every evening.      metoprolol succinate (TOPROL-XL) 200 MG 24 hr tablet Take 200 mg by mouth once daily.      warfarin (COUMADIN) 5 MG tablet Take 5 mg by mouth every Tues, Thurs, Sat.      acetaminophen-codeine 300-60mg (TYLENOL #4) 300-60 mg Tab       albuterol (PROVENTIL/VENTOLIN HFA) 90 mcg/actuation inhaler Inhale 1 puff into the lungs every 4 (four) hours as needed for Wheezing. Rescue      amlodipine-benazepril (LOTREL) 10-40 mg per capsule Take 1 capsule by mouth once daily. 90 capsule 1    aspirin (ECOTRIN) 81 MG EC tablet Take 81 mg by mouth once daily.      atorvastatin (LIPITOR) 80 MG tablet Take 1 tablet (80 mg total) by mouth once daily. 90 tablet 1    blood pressure test kit-large Kit Use as directed 1 each 0    fluticasone propionate (FLOVENT DISKUS) 50 mcg/actuation DsDv Inhale into the lungs. Controller      ketorolac 0.4% (ACULAR) 0.4 % Drop       metformin (GLUCOPHAGE) 1000 MG tablet Take 1,000 mg by mouth 2 (two) times daily  "with meals.      nitroGLYCERIN (NITROSTAT) 0.4 MG SL tablet Place 1 tablet (0.4 mg total) under the tongue every 5 (five) minutes as needed for Chest pain (Do not take more than three doses at one time. If having chest pain, report to nearest emergency room.). (Patient not taking: No sig reported) 90 tablet 1    pen needle, diabetic 31 gauge x 1/4" Ndle       prednisoLONE acetate (PRED FORTE) 1 % DrpS       spironolactone (ALDACTONE) 25 MG tablet Take 25 mg by mouth once daily.      TRESIBA FLEXTOUCH U-200 200 unit/mL (3 mL) InPn       TRUE METRIX GLUCOSE TEST STRIP Strp       vitamin D 1000 units Tab Take 185 mg by mouth once daily.      warfarin (COUMADIN) 7.5 MG tablet Take 7.5 mg by mouth every Mon, Wed, Fri.          Past History   As per HPI and below:  Past Medical History:   Diagnosis Date    Diabetes mellitus     Hypercholesteremia     Hypertension     Miscarriage     Myocardial infarct x2    Stroke      Past Surgical History:   Procedure Laterality Date    CARDIAC SURGERY      cardiac stent placement    CORONARY STENT PLACEMENT      TUBAL LIGATION         Social History     Socioeconomic History    Marital status:    Tobacco Use    Smoking status: Every Day     Current packs/day: 0.50     Average packs/day: 0.5 packs/day for 40.0 years (20.0 ttl pk-yrs)     Types: Cigarettes    Smokeless tobacco: Never   Substance and Sexual Activity    Alcohol use: Yes     Comment: occasionally    Drug use: No    Sexual activity: Yes     Partners: Male     Birth control/protection: None       Family History   Problem Relation Age of Onset    Heart disease Mother     Early death Mother     Diabetes Maternal Aunt        Physical Exam     Vitals:    12/07/23 1228   BP: (!) 159/82   Pulse: 84   Resp: 16   Temp: 97.7 °F (36.5 °C)   TempSrc: Oral   SpO2: 97%   Weight: 62.6 kg (138 lb)   Height: 5' 7" (1.702 m)     Appearance: No acute distress.  Skin: No rashes seen.  Good turgor.  No abrasions.  No ecchymoses.    Chest: " Clear to auscultation bilaterally.  Good air movement.  No wheezes.  No rhonchi.  Cardiovascular: Regular rate and rhythm.  No murmurs. No gallops. No rubs.  Abdomen: Soft.  Not distended.  Nontender.  No guarding.  No rebound.  Musculoskeletal: Good range of motion all joints.  No deformities.  Negative straight leg raise.  No buttock tenderness.  Peripheral vascular: Unable to palpate DP pulse on right, decreased capillary refill.  Good doppler signal of the DP.  Neurologic: Motor intact including proximal and distal leg muscles.  Sensation intact.    Mental Status:  Alert and oriented x 3.  Appropriate, conversant.        External Records Reviewed: Dx of claudication noted in clinic records    Medications Given   Medications - No data to display    Results and Course     Labs Reviewed   HIV 1 / 2 ANTIBODY   HEPATITIS C ANTIBODY       Imaging Results    None                      MDM, Impression and Plan   67 y.o. female with right buttock pain, worse when standing at times, negative straight leg raise.  Doubt sciatica.  She has no pulse but a good Doppler signal.  Foot is warm.  This may be related to peripheral arterial disease, but I highly doubt critical limb ischemia. She is a heavy smoker. Will return refer to vascular surgery for full evaluation.  No indication for ED labs or imaging.       Final diagnoses:  [M79.604] Right leg pain (Primary)  [I73.9] PVD (peripheral vascular disease)  [F17.200] Tobacco dependency        ED Disposition Condition    Discharge Stable          ED Prescriptions    None       Follow-up Information       Follow up With Specialties Details Why Contact Info Additional Information    Ghazala Valencia MD [facility] Call   [address] [facility] [address]  [phone]       Anand Shepard - Vascular Surg [facility] Vascular Surgery Call   [address]  [facility] [address] [phone] Main Building, 5th Floor Please park in [facility] and use Clinic  elevator               Keven Layne MD  12/07/23 8917

## 2023-12-07 NOTE — ED NOTES
Patient identifiers verified and correct for  MS Mohan  C/C: Pain to right leg SEE NN  APPEARANCE: awake and alert in NAD. PAIN  10/10  SKIN: warm, dry and intact. No breakdown or bruising.  MUSCULOSKELETAL: Patient moving all extremities spontaneously, no obvious swelling or deformities noted. Ambulates independently.  RESPIRATORY: Denies shortness of breath.Respirations unlabored.   CARDIAC: Denies CP, 2+ distal pulses; no peripheral edema  ABDOMEN: S/ND/NT, Denies nausea  : voids spontaneously, denies difficulty  Neurologic: AAO x 4; follows commands equal strength in all extremities; denies numbness/tingling. Denies dizziness  Denies ne wweakness

## 2024-05-26 ENCOUNTER — HOSPITAL ENCOUNTER (EMERGENCY)
Facility: HOSPITAL | Age: 69
Discharge: HOME | End: 2024-05-26
Attending: EMERGENCY MEDICINE
Payer: MEDICARE

## 2024-05-26 VITALS
RESPIRATION RATE: 18 BRPM | DIASTOLIC BLOOD PRESSURE: 70 MMHG | OXYGEN SATURATION: 98 % | SYSTOLIC BLOOD PRESSURE: 120 MMHG | BODY MASS INDEX: 21.14 KG/M2 | HEART RATE: 78 BPM | WEIGHT: 135 LBS | TEMPERATURE: 98 F

## 2024-05-26 DIAGNOSIS — I73.9 PVD (PERIPHERAL VASCULAR DISEASE): ICD-10-CM

## 2024-05-26 DIAGNOSIS — I70.0 AORTIC ATHEROSCLEROSIS: ICD-10-CM

## 2024-05-26 DIAGNOSIS — M51.36 DDD (DEGENERATIVE DISC DISEASE), LUMBAR: ICD-10-CM

## 2024-05-26 DIAGNOSIS — M54.32 BILATERAL SCIATICA: Primary | ICD-10-CM

## 2024-05-26 DIAGNOSIS — M53.3 COCCYODYNIA: ICD-10-CM

## 2024-05-26 DIAGNOSIS — Z72.0 TOBACCO USE: ICD-10-CM

## 2024-05-26 DIAGNOSIS — M54.31 BILATERAL SCIATICA: Primary | ICD-10-CM

## 2024-05-26 PROCEDURE — 99284 EMERGENCY DEPT VISIT MOD MDM: CPT | Mod: 25

## 2024-05-26 PROCEDURE — 25000003 PHARM REV CODE 250: Performed by: PHYSICIAN ASSISTANT

## 2024-05-26 RX ORDER — HYDROCODONE BITARTRATE AND ACETAMINOPHEN 5; 325 MG/1; MG/1
1 TABLET ORAL
Status: COMPLETED | OUTPATIENT
Start: 2024-05-26 | End: 2024-05-26

## 2024-05-26 RX ORDER — LIDOCAINE 50 MG/G
1 PATCH TOPICAL
Status: DISCONTINUED | OUTPATIENT
Start: 2024-05-26 | End: 2024-05-26 | Stop reason: HOSPADM

## 2024-05-26 RX ORDER — HYDROCODONE BITARTRATE AND ACETAMINOPHEN 5; 325 MG/1; MG/1
1 TABLET ORAL EVERY 4 HOURS PRN
Qty: 15 TABLET | Refills: 0 | Status: SHIPPED | OUTPATIENT
Start: 2024-05-26 | End: 2024-05-29

## 2024-05-26 RX ORDER — METHOCARBAMOL 500 MG/1
500 TABLET, FILM COATED ORAL 2 TIMES DAILY PRN
Qty: 20 TABLET | Refills: 0 | Status: SHIPPED | OUTPATIENT
Start: 2024-05-26 | End: 2024-06-05

## 2024-05-26 RX ADMIN — LIDOCAINE 1 PATCH: 700 PATCH TOPICAL at 03:05

## 2024-05-26 RX ADMIN — HYDROCODONE BITARTRATE AND ACETAMINOPHEN 1 TABLET: 5; 325 TABLET ORAL at 03:05

## 2024-05-26 NOTE — ED PROVIDER NOTES
"Encounter Date: 5/26/2024       History     Chief Complaint   Patient presents with    Tailbone Pain     States she was told 1 month ago that her "bones were brittle," having tailbone and XAVI leg pain since then.     Patient is a 68-year-old female.  She has a past medical history of hypertension, hyperlipidemia, diabetes, tobacco use, PVD, CAD, and previous stroke.  She presents to the ER for an urgent evaluation complaining of low back pain and tailbone pain.  She denies any injury or trauma.  She states that the pain has been present for the past week.  She states the degree of pain is mild.  She states that the pain course is constant.  She states that she is concerned because she recently had a bone scan and was told that she has osteoporosis.  She denies any radiation of the pain.  She denies any numbness or weakness.  She denies any bowel or bladder dysfunction.      Review of patient's allergies indicates:   Allergen Reactions    Promethazine Anxiety    Ampicillin     Empagliflozin Nausea And Vomiting     Past Medical History:   Diagnosis Date    Diabetes mellitus     Heavy tobacco smoker     Hypercholesteremia     Hypertension     Miscarriage     Myocardial infarct x2    PVD (peripheral vascular disease)     Stroke      Past Surgical History:   Procedure Laterality Date    CARDIAC SURGERY      cardiac stent placement    CORONARY STENT PLACEMENT      TUBAL LIGATION       Family History   Problem Relation Name Age of Onset    Heart disease Mother      Early death Mother      Diabetes Maternal Aunt       Social History     Tobacco Use    Smoking status: Every Day     Current packs/day: 0.50     Average packs/day: 0.5 packs/day for 40.0 years (20.0 ttl pk-yrs)     Types: Cigarettes    Smokeless tobacco: Never   Substance Use Topics    Alcohol use: Yes     Comment: occasionally    Drug use: No     Review of Systems   Constitutional:  Negative for chills and fever.   Respiratory:  Negative for chest tightness and " shortness of breath.    Cardiovascular:  Negative for chest pain and leg swelling.   Gastrointestinal:  Negative for abdominal pain, diarrhea, nausea and vomiting.   Genitourinary:  Negative for decreased urine volume, difficulty urinating, dysuria, flank pain, frequency, hematuria, pelvic pain and urgency.   Musculoskeletal:  Positive for back pain. Negative for gait problem, joint swelling and neck pain.   Skin:  Negative for color change, rash and wound.   Allergic/Immunologic: Negative for immunocompromised state.   Neurological:  Negative for dizziness, syncope, speech difficulty, weakness, light-headedness, numbness and headaches.   Psychiatric/Behavioral:  Negative for confusion.        Physical Exam     Initial Vitals [05/26/24 1407]   BP Pulse Resp Temp SpO2   119/69 77 18 97.2 °F (36.2 °C) 98 %      MAP       --         Physical Exam    Nursing note and vitals reviewed.  Constitutional: She appears well-developed and well-nourished. She is not diaphoretic.   Alert and ambulatory.  No obvious distress.  Accompanied by her .   Eyes: Conjunctivae are normal.   Neck: Neck supple.   Cardiovascular:  Normal rate.           Diminished but palpable PT and DP pulses bilaterally - toes are warm, pink and perfused without cyanosis - patient has documented history of PVD and scheduled follow up with vascular - denies claudication symptoms or paresthesias   Pulmonary/Chest: No respiratory distress.   Abdominal: Abdomen is soft. She exhibits no distension. There is no abdominal tenderness. There is no rebound and no guarding.   Musculoskeletal:      Cervical back: Neck supple.      Comments: Mild diffuse tenderness to palpation of right lumbar paraspinal region, right buttock and sciatic nerve distribution, and over right SI joint.  Patient reports mild sacral tenderness to palpation, on exam there is no focal pain to palpation of coccyx or sacrum there is no erythema or pilonidal cyst/abscess     Neurological:  She is alert and oriented to person, place, and time. She has normal strength. No sensory deficit.   Skin: Skin is warm and dry. No rash noted. No erythema.   Psychiatric: She has a normal mood and affect. Her behavior is normal.         ED Course   Procedures  Labs Reviewed - No data to display       Imaging Results              X-Ray Sacrum And Coccyx (Final result)  Result time 05/26/24 15:55:57      Final result by Sukhdev Estrada MD (05/26/24 15:55:57)                   Impression:      1. No convincing acute displaced fracture or dislocation of the sacrum or coccyx.      Electronically signed by: Sukhdev Estrada MD  Date:    05/26/2024  Time:    15:55               Narrative:    EXAMINATION:  XR SACRUM AND COCCYX    CLINICAL HISTORY:  Sacrococcygeal disorders, not elsewhere classified    TECHNIQUE:  Two or three views of the sacrum and coccyx were performed.    COMPARISON:  None    FINDINGS:  Three views sacrum and coccyx.    The bilateral sacroiliac joints are intact.  The pubic symphysis is intact.  The bilateral femoroacetabular joints are intact.  Lateral imaging demonstrates adequate alignment of the sacral coccygeal segments.  There are degenerative changes of the lumbar spine.  Dystrophic calcification projects over the pelvis.  There is vascular calcification.                                       X-Ray Lumbar Spine Ap And Lateral (Final result)  Result time 05/26/24 15:53:44      Final result by Sukhdev Estrada MD (05/26/24 15:53:44)                   Impression:      1. No convincing acute displaced fracture or dislocation of the lumbar spine.      Electronically signed by: Sukhdev Estrada MD  Date:    05/26/2024  Time:    15:53               Narrative:    EXAMINATION:  XR LUMBAR SPINE AP AND LATERAL    CLINICAL HISTORY:  low back pain;    TECHNIQUE:  AP, lateral and spot images were performed of the lumbar spine.    COMPARISON:  None    FINDINGS:  Three views lumbar spine.    Lateral imaging  demonstrates adequate alignment of the lumbar spine without significant vertebral body height loss or disc space height loss.  The facet joints are aligned noting lower lumbar facet arthropathy.  The sacral segments are aligned.  AP spinal alignment is remarkable for mild dextroscoliotic curvature.  The sacroiliac joints are intact.  There is calcification of the aorta and its branches.  Surgical changes project over the right upper quadrant and pelvis.  Dystrophic calcification projects over the pelvis.                                       Medications   HYDROcodone-acetaminophen 5-325 mg per tablet 1 tablet (1 tablet Oral Given 5/26/24 4077)     Medical Decision Making  Amount and/or Complexity of Data Reviewed  Radiology: ordered.    Risk  Prescription drug management.      Additional MDM:   Smoking Cessation: The patient is a smoker. The patient was counseled on smoking cessation for: 5 minutes. The patient was counseled on tobacco related  health complications. Appropriate patient literature was given to the patient concerning tobacco cessation.                      Medical Decision Making:   History:   I obtained history from: someone other than patient.       <> Summary of History: History obtained from the patient and from her   Old Medical Records: I decided to obtain old medical records.  Old Records Summarized: other records.       <> Summary of Records: Chart review was completed, previous records reviewed.  Recent bone scan results reviewed.  Initial Assessment:   68-year-old female presents to the ER complaining of acute atraumatic low back pain and tailbone pain x1 week  Differential Diagnosis:   Sciatica, lumbar strain, lumbago, coccyx injury, sacroiliitis, fracture, degenerative disc disease, etc.  Clinical Tests:   Radiological Study: Ordered and Reviewed  ED Management:  Vital signs reviewed, benign  Chart review completed  Lumbar and sacral radiographs were completed showing degenerative  chronic changes; incidental findings of extensive vascular calcifications noted, no acute findings  Patient reports feeling better after treatment in the ER and is eager for discharge  All results discussed patient including incidental findings  Patient has a spanning referral to see vascular surgery for PVD and atherosclerosis - emphasized importance of close follow up and smoking cessation  Patient was advised to follow up with the primary care physician in the next 1-2 days for re-evaluation and further management  Return precautions advised               Clinical Impression:  Final diagnoses:  [M53.3] Coccyodynia  [Z72.0] Tobacco use  [M54.31, M54.32] Bilateral sciatica (Primary)  [I73.9] PVD (peripheral vascular disease)  [M51.36] DDD (degenerative disc disease), lumbar  [I70.0] Aortic atherosclerosis          ED Disposition Condition    Discharge Stable          ED Prescriptions       Medication Sig Dispense Start Date End Date Auth. Provider    methocarbamoL (ROBAXIN) 500 MG Tab Take 1 tablet (500 mg total) by mouth 2 (two) times daily as needed (muscle relaxer). 20 tablet 5/26/2024 6/5/2024 Anastacio Peña PA-C    HYDROcodone-acetaminophen (NORCO) 5-325 mg per tablet Take 1 tablet by mouth every 4 (four) hours as needed for Pain. 15 tablet 5/26/2024 5/29/2024 Anastacio Peña PA-C          Follow-up Information       Follow up With Specialties Details Why Contact Info Additional Information    Ghazala Valencia MD Family Medicine Schedule an appointment as soon as possible for a visit  Follow up with your PCP in the next 1-2 days for re-evaluation and further management. Discontinue smoking. [address] [facility] [address]  [phone]       Anand Shepard - Vascular Surg [facility] Vascular Surgery  Follow up with vascular surgery at the next available appointment time. [address]  [facility] [address] [phone] Main Building, 5th Floor Please park in [facility] and  use Clinic elevator    [facility]  Follow up with spine clinic at next available. [address]  [facility] [address] [phone] [facility] - Main Building, 5th Floor Please park in [facility] and use Atrium elevator    Anand Shepard - Emergency Dept Emergency Medicine  If symptoms worsen in any way [id number]  [facility] [address] [phone]              Anastacio Peña, PA-C  05/26/24 6215

## 2024-05-26 NOTE — ED NOTES
Patient identifiers verified and correct for Ms Garcia  C/C:  Mid lower back pain x 2 months SEE NN  APPEARANCE: awake and alert in NAD. PAIN  10/10  SKIN: warm, dry and intact. No breakdown or bruising.  MUSCULOSKELETAL: Patient moving all extremities spontaneously, no obvious swelling or deformities noted. Ambulates independently.  RESPIRATORY: Denies shortness of breath.Respirations unlabored.   CARDIAC: Denies CP, 2+ distal pulses; no peripheral edema  ABDOMEN: S/ND/NT, Denies nausea  : voids spontaneously, denies difficulty  Neurologic: AAO x 4; follows commands equal strength in all extremities; denies numbness/tingling. Denies dizziness  Denies new wekaness, reports pain radaites down both legs

## 2025-06-08 PROCEDURE — 93010 ELECTROCARDIOGRAM REPORT: CPT | Mod: ,,, | Performed by: INTERNAL MEDICINE

## 2025-06-08 PROCEDURE — 93005 ELECTROCARDIOGRAM TRACING: CPT

## 2025-06-08 PROCEDURE — 99285 EMERGENCY DEPT VISIT HI MDM: CPT | Mod: 25

## 2025-06-09 ENCOUNTER — HOSPITAL ENCOUNTER (INPATIENT)
Facility: HOSPITAL | Age: 70
LOS: 3 days | Discharge: HOME | DRG: 813 | End: 2025-06-15
Attending: EMERGENCY MEDICINE
Payer: MEDICARE

## 2025-06-09 DIAGNOSIS — R06.02 SOB (SHORTNESS OF BREATH): Primary | ICD-10-CM

## 2025-06-09 DIAGNOSIS — Z86.73 H/O: CVA (CEREBROVASCULAR ACCIDENT): Chronic | ICD-10-CM

## 2025-06-09 DIAGNOSIS — D72.829 LEUKOCYTOSIS, UNSPECIFIED TYPE: ICD-10-CM

## 2025-06-09 DIAGNOSIS — I82.90 THROMBUS: ICD-10-CM

## 2025-06-09 DIAGNOSIS — R04.2 HEMOPTYSIS: ICD-10-CM

## 2025-06-09 DIAGNOSIS — E11.9 TYPE 2 DIABETES MELLITUS WITHOUT COMPLICATION, WITH LONG-TERM CURRENT USE OF INSULIN: Chronic | ICD-10-CM

## 2025-06-09 DIAGNOSIS — Z79.4 TYPE 2 DIABETES MELLITUS WITHOUT COMPLICATION, WITH LONG-TERM CURRENT USE OF INSULIN: Chronic | ICD-10-CM

## 2025-06-09 DIAGNOSIS — R00.0 TACHYCARDIA: ICD-10-CM

## 2025-06-09 DIAGNOSIS — Z13.6 SCREENING FOR CARDIOVASCULAR CONDITION: ICD-10-CM

## 2025-06-09 DIAGNOSIS — I51.3 LV (LEFT VENTRICULAR) MURAL THROMBUS: ICD-10-CM

## 2025-06-09 DIAGNOSIS — I25.2 HISTORY OF MI (MYOCARDIAL INFARCTION): Chronic | ICD-10-CM

## 2025-06-09 DIAGNOSIS — R79.1 SUPRATHERAPEUTIC INR: ICD-10-CM

## 2025-06-09 DIAGNOSIS — E78.5 HYPERLIPIDEMIA LDL GOAL <70: ICD-10-CM

## 2025-06-09 DIAGNOSIS — Z72.0 TOBACCO ABUSE: ICD-10-CM

## 2025-06-09 DIAGNOSIS — H11.32 SUBCONJUNCTIVAL HEMORRHAGE, LEFT: ICD-10-CM

## 2025-06-09 DIAGNOSIS — R07.9 CHEST PAIN: ICD-10-CM

## 2025-06-09 DIAGNOSIS — I10 PRIMARY HYPERTENSION: Chronic | ICD-10-CM

## 2025-06-09 LAB
ABSOLUTE EOSINOPHIL (OHS): 0.01 K/UL
ABSOLUTE MONOCYTE (OHS): 1.12 K/UL (ref 0.3–1)
ABSOLUTE NEUTROPHIL COUNT (OHS): 17.49 K/UL (ref 1.8–7.7)
ALBUMIN SERPL BCP-MCNC: 4.1 G/DL (ref 3.5–5.2)
ALP SERPL-CCNC: 141 UNIT/L (ref 40–150)
ALT SERPL W/O P-5'-P-CCNC: 17 UNIT/L (ref 10–44)
ANION GAP (OHS): 15 MMOL/L (ref 8–16)
AST SERPL-CCNC: 19 UNIT/L (ref 11–45)
BACTERIA #/AREA URNS AUTO: ABNORMAL /HPF
BASOPHILS # BLD AUTO: 0.13 K/UL
BASOPHILS NFR BLD AUTO: 0.6 %
BILIRUB DIRECT SERPL-MCNC: 0.6 MG/DL (ref 0.1–0.3)
BILIRUB SERPL-MCNC: 1.4 MG/DL (ref 0.1–1)
BILIRUB UR QL STRIP.AUTO: NEGATIVE
BIPAP: 0
BUN SERPL-MCNC: 23 MG/DL (ref 8–23)
CALCIUM SERPL-MCNC: 9.5 MG/DL (ref 8.7–10.5)
CHLORIDE SERPL-SCNC: 104 MMOL/L (ref 95–110)
CHOLEST SERPL-MCNC: 99 MG/DL (ref 120–199)
CHOLEST/HDLC SERPL: 2.8 {RATIO} (ref 2–5)
CLARITY UR: ABNORMAL
CO2 SERPL-SCNC: 20 MMOL/L (ref 23–29)
COLOR UR AUTO: YELLOW
CREAT SERPL-MCNC: 0.8 MG/DL (ref 0.5–1.4)
EAG (OHS): 192 MG/DL (ref 68–131)
ERYTHROCYTE [DISTWIDTH] IN BLOOD BY AUTOMATED COUNT: 14.2 % (ref 11.5–14.5)
GFR SERPLBLD CREATININE-BSD FMLA CKD-EPI: >60 ML/MIN/1.73/M2
GLUCOSE SERPL-MCNC: 319 MG/DL (ref 70–110)
GLUCOSE UR QL STRIP: ABNORMAL
HBA1C MFR BLD: 8.3 % (ref 4–5.6)
HCT VFR BLD AUTO: 50.2 % (ref 37–48.5)
HCT VFR BLD CALC: 50 % (ref 36–54)
HCV AB SERPL QL IA: NORMAL
HDLC SERPL-MCNC: 35 MG/DL (ref 40–75)
HDLC SERPL: 35.4 % (ref 20–50)
HGB BLD-MCNC: 17.4 GM/DL (ref 12–16)
HGB UR QL STRIP: ABNORMAL
HIV 1+2 AB+HIV1 P24 AG SERPL QL IA: NORMAL
HOLD SPECIMEN: NORMAL
HOLD SPECIMEN: NORMAL
HYALINE CASTS UR QL AUTO: 1 /LPF (ref 0–1)
IMM GRANULOCYTES # BLD AUTO: 0.17 K/UL (ref 0–0.04)
IMM GRANULOCYTES NFR BLD AUTO: 0.8 % (ref 0–0.5)
INR PPP: >10 (ref 0.8–1.2)
KETONES UR QL STRIP: ABNORMAL
LDH SERPL L TO P-CCNC: 1.2 MMOL/L (ref 0.5–2.2)
LDLC SERPL CALC-MCNC: 49.8 MG/DL (ref 63–159)
LEUKOCYTE ESTERASE UR QL STRIP: NEGATIVE
LYMPHOCYTES # BLD AUTO: 1.96 K/UL (ref 1–4.8)
MCH RBC QN AUTO: 31.2 PG (ref 27–31)
MCHC RBC AUTO-ENTMCNC: 34.7 G/DL (ref 32–36)
MCV RBC AUTO: 90 FL (ref 82–98)
MICROSCOPIC COMMENT: ABNORMAL
NITRITE UR QL STRIP: NEGATIVE
NONHDLC SERPL-MCNC: 64 MG/DL
NUCLEATED RBC (/100WBC) (OHS): 0 /100 WBC
OHS QRS DURATION: 80 MS
OHS QTC CALCULATION: 455 MS
PCO2 BLDA: 34.4 MMHG (ref 35–45)
PH SMN: 7.45 [PH] (ref 7.35–7.45)
PH UR STRIP: 6 [PH]
PLATELET # BLD AUTO: 201 K/UL (ref 150–450)
PMV BLD AUTO: 11.1 FL (ref 9.2–12.9)
PO2 BLDA: 48.4 MMHG (ref 40–60)
POC BASE DEFICIT: 0.2 MMOL/L
POC HCO3: 24.3 MMOL/L (ref 24–28)
POC PERFORMED BY: ABNORMAL
POCT GLUCOSE: 302 MG/DL (ref 70–110)
POCT GLUCOSE: 323 MG/DL (ref 70–110)
POTASSIUM SERPL-SCNC: 3.3 MMOL/L (ref 3.5–5.1)
PROCALCITONIN SERPL-MCNC: 0.02 NG/ML
PROCALCITONIN SERPL-MCNC: 0.1 NG/ML
PROT SERPL-MCNC: 8.2 GM/DL (ref 6–8.4)
PROT UR QL STRIP: ABNORMAL
PROTHROMBIN TIME: >100 SECONDS (ref 9–12.5)
RBC # BLD AUTO: 5.58 M/UL (ref 4–5.4)
RBC #/AREA URNS AUTO: 21 /HPF (ref 0–4)
RELATIVE EOSINOPHIL (OHS): 0 %
RELATIVE LYMPHOCYTE (OHS): 9.4 % (ref 18–48)
RELATIVE MONOCYTE (OHS): 5.4 % (ref 4–15)
RELATIVE NEUTROPHIL (OHS): 83.8 % (ref 38–73)
SODIUM SERPL-SCNC: 139 MMOL/L (ref 136–145)
SP GR UR STRIP: 1.01
SPECIMEN SOURCE: ABNORMAL
SQUAMOUS #/AREA URNS AUTO: 5 /HPF
TRIGL SERPL-MCNC: 71 MG/DL (ref 30–150)
UROBILINOGEN UR STRIP-ACNC: NEGATIVE EU/DL
WBC # BLD AUTO: 20.88 K/UL (ref 3.9–12.7)
WBC #/AREA URNS AUTO: 4 /HPF (ref 0–5)
YEAST UR QL AUTO: ABNORMAL /HPF

## 2025-06-09 PROCEDURE — 81001 URINALYSIS AUTO W/SCOPE: CPT | Performed by: STUDENT IN AN ORGANIZED HEALTH CARE EDUCATION/TRAINING PROGRAM

## 2025-06-09 PROCEDURE — 84145 PROCALCITONIN (PCT): CPT | Mod: 91 | Performed by: INTERNAL MEDICINE

## 2025-06-09 PROCEDURE — 83036 HEMOGLOBIN GLYCOSYLATED A1C: CPT | Performed by: INTERNAL MEDICINE

## 2025-06-09 PROCEDURE — 63600175 PHARM REV CODE 636 W HCPCS: Performed by: STUDENT IN AN ORGANIZED HEALTH CARE EDUCATION/TRAINING PROGRAM

## 2025-06-09 PROCEDURE — 80053 COMPREHEN METABOLIC PANEL: CPT | Performed by: EMERGENCY MEDICINE

## 2025-06-09 PROCEDURE — 82962 GLUCOSE BLOOD TEST: CPT

## 2025-06-09 PROCEDURE — 85610 PROTHROMBIN TIME: CPT | Performed by: EMERGENCY MEDICINE

## 2025-06-09 PROCEDURE — 25000003 PHARM REV CODE 250: Performed by: EMERGENCY MEDICINE

## 2025-06-09 PROCEDURE — 86803 HEPATITIS C AB TEST: CPT | Performed by: PHYSICIAN ASSISTANT

## 2025-06-09 PROCEDURE — 25000003 PHARM REV CODE 250: Performed by: STUDENT IN AN ORGANIZED HEALTH CARE EDUCATION/TRAINING PROGRAM

## 2025-06-09 PROCEDURE — 85025 COMPLETE CBC W/AUTO DIFF WBC: CPT | Performed by: EMERGENCY MEDICINE

## 2025-06-09 PROCEDURE — 84145 PROCALCITONIN (PCT): CPT | Mod: 59 | Performed by: STUDENT IN AN ORGANIZED HEALTH CARE EDUCATION/TRAINING PROGRAM

## 2025-06-09 PROCEDURE — 25000003 PHARM REV CODE 250: Performed by: INTERNAL MEDICINE

## 2025-06-09 PROCEDURE — 87389 HIV-1 AG W/HIV-1&-2 AB AG IA: CPT | Performed by: PHYSICIAN ASSISTANT

## 2025-06-09 PROCEDURE — G0378 HOSPITAL OBSERVATION PER HR: HCPCS

## 2025-06-09 PROCEDURE — 82248 BILIRUBIN DIRECT: CPT | Performed by: EMERGENCY MEDICINE

## 2025-06-09 PROCEDURE — 63600175 PHARM REV CODE 636 W HCPCS: Performed by: EMERGENCY MEDICINE

## 2025-06-09 PROCEDURE — 83605 ASSAY OF LACTIC ACID: CPT

## 2025-06-09 PROCEDURE — 87040 BLOOD CULTURE FOR BACTERIA: CPT | Mod: 91 | Performed by: STUDENT IN AN ORGANIZED HEALTH CARE EDUCATION/TRAINING PROGRAM

## 2025-06-09 PROCEDURE — 96375 TX/PRO/DX INJ NEW DRUG ADDON: CPT

## 2025-06-09 PROCEDURE — 99900035 HC TECH TIME PER 15 MIN (STAT)

## 2025-06-09 PROCEDURE — 87040 BLOOD CULTURE FOR BACTERIA: CPT | Performed by: EMERGENCY MEDICINE

## 2025-06-09 PROCEDURE — 82803 BLOOD GASES ANY COMBINATION: CPT

## 2025-06-09 PROCEDURE — 25500020 PHARM REV CODE 255: Performed by: EMERGENCY MEDICINE

## 2025-06-09 PROCEDURE — 96365 THER/PROPH/DIAG IV INF INIT: CPT

## 2025-06-09 PROCEDURE — 96372 THER/PROPH/DIAG INJ SC/IM: CPT | Performed by: STUDENT IN AN ORGANIZED HEALTH CARE EDUCATION/TRAINING PROGRAM

## 2025-06-09 PROCEDURE — 80061 LIPID PANEL: CPT | Performed by: INTERNAL MEDICINE

## 2025-06-09 RX ORDER — PREDNISONE 20 MG/1
40 TABLET ORAL DAILY
Status: DISCONTINUED | OUTPATIENT
Start: 2025-06-09 | End: 2025-06-09

## 2025-06-09 RX ORDER — ACETAMINOPHEN 500 MG
1000 TABLET ORAL
Status: COMPLETED | OUTPATIENT
Start: 2025-06-09 | End: 2025-06-09

## 2025-06-09 RX ORDER — SODIUM CHLORIDE 0.9 % (FLUSH) 0.9 %
10 SYRINGE (ML) INJECTION
Status: DISCONTINUED | OUTPATIENT
Start: 2025-06-09 | End: 2025-06-16 | Stop reason: HOSPADM

## 2025-06-09 RX ORDER — INSULIN ASPART 100 [IU]/ML
0-5 INJECTION, SOLUTION INTRAVENOUS; SUBCUTANEOUS
Status: DISCONTINUED | OUTPATIENT
Start: 2025-06-09 | End: 2025-06-16 | Stop reason: HOSPADM

## 2025-06-09 RX ORDER — ALBUTEROL SULFATE 90 UG/1
1 INHALANT RESPIRATORY (INHALATION) EVERY 4 HOURS PRN
Status: DISCONTINUED | OUTPATIENT
Start: 2025-06-09 | End: 2025-06-16 | Stop reason: HOSPADM

## 2025-06-09 RX ORDER — FLUTICASONE FUROATE AND VILANTEROL 100; 25 UG/1; UG/1
1 POWDER RESPIRATORY (INHALATION) DAILY
Status: DISCONTINUED | OUTPATIENT
Start: 2025-06-09 | End: 2025-06-16 | Stop reason: HOSPADM

## 2025-06-09 RX ORDER — SPIRONOLACTONE 25 MG/1
25 TABLET ORAL DAILY
Status: DISCONTINUED | OUTPATIENT
Start: 2025-06-09 | End: 2025-06-11

## 2025-06-09 RX ORDER — IPRATROPIUM BROMIDE AND ALBUTEROL SULFATE 2.5; .5 MG/3ML; MG/3ML
3 SOLUTION RESPIRATORY (INHALATION)
Status: DISCONTINUED | OUTPATIENT
Start: 2025-06-09 | End: 2025-06-09

## 2025-06-09 RX ORDER — IBUPROFEN 200 MG
24 TABLET ORAL
Status: DISCONTINUED | OUTPATIENT
Start: 2025-06-09 | End: 2025-06-16 | Stop reason: HOSPADM

## 2025-06-09 RX ORDER — GLUCAGON 1 MG
1 KIT INJECTION
Status: DISCONTINUED | OUTPATIENT
Start: 2025-06-09 | End: 2025-06-16 | Stop reason: HOSPADM

## 2025-06-09 RX ORDER — AMLODIPINE AND BENAZEPRIL HYDROCHLORIDE 5; 20 MG/1; MG/1
2 CAPSULE ORAL DAILY
Status: DISCONTINUED | OUTPATIENT
Start: 2025-06-09 | End: 2025-06-11

## 2025-06-09 RX ORDER — POTASSIUM CHLORIDE 20 MEQ/1
40 TABLET, EXTENDED RELEASE ORAL ONCE
Status: COMPLETED | OUTPATIENT
Start: 2025-06-09 | End: 2025-06-09

## 2025-06-09 RX ORDER — PHYTONADIONE 5 MG/1
5 TABLET ORAL
Status: COMPLETED | OUTPATIENT
Start: 2025-06-09 | End: 2025-06-09

## 2025-06-09 RX ORDER — IPRATROPIUM BROMIDE AND ALBUTEROL SULFATE 2.5; .5 MG/3ML; MG/3ML
3 SOLUTION RESPIRATORY (INHALATION) EVERY 6 HOURS PRN
Status: DISCONTINUED | OUTPATIENT
Start: 2025-06-09 | End: 2025-06-16 | Stop reason: HOSPADM

## 2025-06-09 RX ORDER — ASPIRIN 81 MG/1
81 TABLET ORAL DAILY
Status: DISCONTINUED | OUTPATIENT
Start: 2025-06-09 | End: 2025-06-16 | Stop reason: HOSPADM

## 2025-06-09 RX ORDER — CEFTRIAXONE 1 G/1
1 INJECTION, POWDER, FOR SOLUTION INTRAMUSCULAR; INTRAVENOUS
Status: COMPLETED | OUTPATIENT
Start: 2025-06-09 | End: 2025-06-09

## 2025-06-09 RX ORDER — NALOXONE HCL 0.4 MG/ML
0.02 VIAL (ML) INJECTION
Status: DISCONTINUED | OUTPATIENT
Start: 2025-06-09 | End: 2025-06-16 | Stop reason: HOSPADM

## 2025-06-09 RX ORDER — METOPROLOL SUCCINATE 100 MG/1
200 TABLET, EXTENDED RELEASE ORAL DAILY
Status: DISCONTINUED | OUTPATIENT
Start: 2025-06-09 | End: 2025-06-11

## 2025-06-09 RX ORDER — SODIUM CHLORIDE 0.9 % (FLUSH) 0.9 %
3 SYRINGE (ML) INJECTION
Status: DISCONTINUED | OUTPATIENT
Start: 2025-06-09 | End: 2025-06-16 | Stop reason: HOSPADM

## 2025-06-09 RX ORDER — ATORVASTATIN CALCIUM 40 MG/1
80 TABLET, FILM COATED ORAL DAILY
Status: DISCONTINUED | OUTPATIENT
Start: 2025-06-09 | End: 2025-06-16 | Stop reason: HOSPADM

## 2025-06-09 RX ORDER — DOXYCYCLINE HYCLATE 100 MG
100 TABLET ORAL EVERY 12 HOURS
Status: DISCONTINUED | OUTPATIENT
Start: 2025-06-09 | End: 2025-06-11

## 2025-06-09 RX ORDER — IBUPROFEN 200 MG
1 TABLET ORAL DAILY
Status: DISCONTINUED | OUTPATIENT
Start: 2025-06-09 | End: 2025-06-16 | Stop reason: HOSPADM

## 2025-06-09 RX ORDER — ACETAMINOPHEN 325 MG/1
650 TABLET ORAL EVERY 4 HOURS PRN
Status: DISCONTINUED | OUTPATIENT
Start: 2025-06-09 | End: 2025-06-16 | Stop reason: HOSPADM

## 2025-06-09 RX ORDER — IBUPROFEN 200 MG
16 TABLET ORAL
Status: DISCONTINUED | OUTPATIENT
Start: 2025-06-09 | End: 2025-06-16 | Stop reason: HOSPADM

## 2025-06-09 RX ORDER — FLUTICASONE PROPIONATE 110 UG/1
1 AEROSOL, METERED RESPIRATORY (INHALATION) EVERY 12 HOURS
Status: DISCONTINUED | OUTPATIENT
Start: 2025-06-09 | End: 2025-06-09

## 2025-06-09 RX ORDER — WARFARIN SODIUM 5 MG/1
5 TABLET ORAL DAILY
Status: DISCONTINUED | OUTPATIENT
Start: 2025-06-09 | End: 2025-06-09

## 2025-06-09 RX ADMIN — CEFTRIAXONE 1 G: 1 INJECTION, POWDER, FOR SOLUTION INTRAMUSCULAR; INTRAVENOUS at 02:06

## 2025-06-09 RX ADMIN — INSULIN ASPART 4 UNITS: 100 INJECTION, SOLUTION INTRAVENOUS; SUBCUTANEOUS at 05:06

## 2025-06-09 RX ADMIN — PHYTONADIONE 5 MG: 5 TABLET ORAL at 02:06

## 2025-06-09 RX ADMIN — AZITHROMYCIN MONOHYDRATE 500 MG: 500 INJECTION, POWDER, LYOPHILIZED, FOR SOLUTION INTRAVENOUS at 02:06

## 2025-06-09 RX ADMIN — PREDNISONE 40 MG: 20 TABLET ORAL at 08:06

## 2025-06-09 RX ADMIN — IOHEXOL 75 ML: 350 INJECTION, SOLUTION INTRAVENOUS at 02:06

## 2025-06-09 RX ADMIN — ACETAMINOPHEN 1000 MG: 500 TABLET ORAL at 01:06

## 2025-06-09 RX ADMIN — DOXYCYCLINE HYCLATE 100 MG: 100 TABLET, FILM COATED ORAL at 08:06

## 2025-06-09 RX ADMIN — ATORVASTATIN CALCIUM 80 MG: 40 TABLET, FILM COATED ORAL at 08:06

## 2025-06-09 RX ADMIN — POTASSIUM CHLORIDE 40 MEQ: 1500 TABLET, EXTENDED RELEASE ORAL at 03:06

## 2025-06-09 RX ADMIN — SPIRONOLACTONE 25 MG: 25 TABLET, FILM COATED ORAL at 08:06

## 2025-06-09 RX ADMIN — ASPIRIN 81 MG: 81 TABLET, COATED ORAL at 08:06

## 2025-06-09 RX ADMIN — DOXYCYCLINE HYCLATE 100 MG: 100 TABLET, FILM COATED ORAL at 09:06

## 2025-06-09 RX ADMIN — METOPROLOL SUCCINATE 200 MG: 100 TABLET, EXTENDED RELEASE ORAL at 08:06

## 2025-06-09 NOTE — CARE UPDATE
Son was at bedside.  Patient at times is not a great historian and apparently did not have any coughing and was sneezing when she saw blood, had blood shot eyes to her left eye.  Denies any shortness of breath, chest pain or cough.  No fevers, urinary symptoms, nausea, vomiting or diarrhea.  She received vitamin K.  We will continue to hold warfarin.  Discontinue steroids, and switch nebs to p.r.n. given CT findings.  Chest is clear to auscultation.  We will leave doxy for now.  Procalcitonin was normal. Continue to monitor WBC.  Continue to monitor PT INR

## 2025-06-09 NOTE — H&P
[facility] - Emergency Dept  [facility]  History & Physical    Patient Name: Karen Mohan  MRN: [id number]  Patient Class: OP- Observation  Admission Date: 6/9/2025  Attending Physician: Keven Lim MD   Primary Care Provider: Ghazala Valencia MD         Patient information was obtained from patient, spouse/SO, past medical records, and ER records.     Subjective:     Principal Problem:Supratherapeutic INR    Chief Complaint:   Chief Complaint   Patient presents with    Epistaxis     X3 episodes this evening. +L eye redness, coughed up a small amount of blood last night.        HPI: Karen Mohan is a 69-year-old female with a PMHx of CAD s/p MI with LV thrombus on coumadin, HLD, HTN, DM2, tobacco use, emphysema presenting today for small volume hemoptysis. She coughed earlier and had dark red blood mixed with sputum. This happened 2 additional times. She also noticed that her left eye looked bloodshot earlier today prior to coughing. No preceding productive cough, fever/chills, sick contacts. She is on Coumadin, last INR check was 1 month ago. She is a current smoker, denies chest pain or shortness of breath. No leg swelling. No recent fevers or chills. Denies epistaxis.     H/H elevated in the setting of chronic emphysema. INR supratherapeutic >10. No recent trauma, melena, or hematuria.    Past Medical History:   Diagnosis Date    Diabetes mellitus     Heavy tobacco smoker     Hypercholesteremia     Hypertension     Miscarriage     Myocardial infarct x2    PVD (peripheral vascular disease)     Stroke        Past Surgical History:   Procedure Laterality Date    CARDIAC SURGERY      cardiac stent placement    CORONARY STENT PLACEMENT      TUBAL LIGATION         Review of patient's allergies indicates:   Allergen Reactions    Promethazine Anxiety    Ampicillin     Empagliflozin Nausea And Vomiting       No current facility-administered medications on file prior to encounter.     Current Outpatient Medications  "on File Prior to Encounter   Medication Sig    albuterol (PROVENTIL/VENTOLIN HFA) 90 mcg/actuation inhaler Inhale 1 puff into the lungs every 4 (four) hours as needed for Wheezing. Rescue    amlodipine-benazepril (LOTREL) 10-40 mg per capsule Take 1 capsule by mouth once daily.    aspirin (ECOTRIN) 81 MG EC tablet Take 81 mg by mouth once daily.    atorvastatin (LIPITOR) 80 MG tablet Take 1 tablet (80 mg total) by mouth once daily.    blood pressure test kit-large Kit Use as directed    fluticasone propionate (FLOVENT DISKUS) 50 mcg/actuation DsDv Inhale into the lungs. Controller    glimepiride (AMARYL) 4 MG tablet Take 4 mg by mouth before breakfast.    insulin glargine (LANTUS) 100 unit/mL injection Inject 36 Units into the skin every evening.    ketorolac 0.4% (ACULAR) 0.4 % Drop     metformin (GLUCOPHAGE) 1000 MG tablet Take 1,000 mg by mouth 2 (two) times daily with meals.    metoprolol succinate (TOPROL-XL) 200 MG 24 hr tablet Take 200 mg by mouth once daily.    nitroGLYCERIN (NITROSTAT) 0.4 MG SL tablet Place 1 tablet (0.4 mg total) under the tongue every 5 (five) minutes as needed for Chest pain (Do not take more than three doses at one time. If having chest pain, report to nearest emergency room.). (Patient not taking: No sig reported)    pen needle, diabetic 31 gauge x 1/4" Ndle     prednisoLONE acetate (PRED FORTE) 1 % DrpS     spironolactone (ALDACTONE) 25 MG tablet Take 25 mg by mouth once daily.    TRESIBA FLEXTOUCH U-200 200 unit/mL (3 mL) InPn     TRUE METRIX GLUCOSE TEST STRIP Strp     vitamin D 1000 units Tab Take 185 mg by mouth once daily.    warfarin (COUMADIN) 5 MG tablet Take 5 mg by mouth every Tues, Thurs, Sat.    warfarin (COUMADIN) 7.5 MG tablet Take 7.5 mg by mouth every Mon, Wed, Fri.      Family History       Problem Relation (Age of Onset)    Diabetes Maternal Aunt    Early death Mother    Heart disease Mother          Tobacco Use    Smoking status: Every Day     Current packs/day: " 0.50     Average packs/day: 0.5 packs/day for 40.0 years (20.0 ttl pk-yrs)     Types: Cigarettes    Smokeless tobacco: Never   Substance and Sexual Activity    Alcohol use: Yes     Comment: occasionally    Drug use: No    Sexual activity: Yes     Partners: Male     Birth control/protection: None     Review of Systems   Constitutional:  Negative for chills, diaphoresis, fatigue and fever.   HENT:  Negative for congestion, nosebleeds, postnasal drip, sore throat and trouble swallowing.    Eyes:  Positive for redness. Negative for visual disturbance.   Respiratory:  Positive for cough. Negative for apnea, chest tightness, shortness of breath and wheezing.    Cardiovascular:  Negative for chest pain, palpitations and leg swelling.   Gastrointestinal:  Negative for abdominal distention, abdominal pain, blood in stool, constipation, diarrhea, nausea and vomiting.   Genitourinary:  Negative for difficulty urinating, flank pain and hematuria.   Musculoskeletal:  Negative for arthralgias and myalgias.   Skin:  Negative for pallor and rash.   Neurological:  Negative for dizziness, syncope, facial asymmetry, weakness, light-headedness, numbness and headaches.   Psychiatric/Behavioral:  Negative for agitation. The patient is not nervous/anxious.      Objective:     Vital Signs (Most Recent):  Temp: 98.2 °F (36.8 °C) (06/08/25 2329)  Pulse: 99 (06/09/25 0500)  Resp: 18 (06/08/25 2329)  BP: 132/66 (06/09/25 0245)  SpO2: 95 % (06/09/25 0500) Vital Signs (24h Range):  Temp:  [98.2 °F (36.8 °C)] 98.2 °F (36.8 °C)  Pulse:  [] 99  Resp:  [18] 18  SpO2:  [94 %-96 %] 95 %  BP: (132-133)/(66-88) 132/66     Weight: 61.2 kg (135 lb)  Body mass index is 21.14 kg/m².     Physical Exam  Vitals and nursing note reviewed.   Constitutional:       General: She is not in acute distress.     Appearance: Normal appearance. She is not diaphoretic.   HENT:      Head: Normocephalic and atraumatic.   Eyes:      General: No scleral icterus.         "Left eye: Discharge (conjunctival hemorrhage) present.     Pupils: Pupils are equal, round, and reactive to light.   Cardiovascular:      Rate and Rhythm: Normal rate and regular rhythm.      Heart sounds: No murmur heard.  Pulmonary:      Effort: Respiratory distress present.      Breath sounds: Wheezing present.      Comments: Reduced breath sounds bilateral upper lobes. Expiratory wheezing  Chest:      Chest wall: No tenderness.   Abdominal:      General: Abdomen is flat. There is no distension.      Palpations: Abdomen is soft. There is no mass.      Tenderness: There is no abdominal tenderness. There is no guarding.   Musculoskeletal:         General: No swelling or tenderness.   Skin:     General: Skin is warm and dry.      Capillary Refill: Capillary refill takes less than 2 seconds.      Coloration: Skin is not jaundiced.      Findings: No rash.   Neurological:      General: No focal deficit present.      Mental Status: She is alert and oriented to person, place, and time.   Psychiatric:         Mood and Affect: Mood normal.         Thought Content: Thought content normal.     Significant Labs: All pertinent labs within the past 24 hours have been reviewed.  CBC:   Recent Labs   Lab 06/09/25  0103 06/09/25  0349   WBC 20.88*  --    HGB 17.4*  --    HCT 50.2* 50.0     --      CMP:   Recent Labs   Lab 06/09/25  0103      K 3.3*      CO2 20*   *   BUN 23   CREATININE 0.8   CALCIUM 9.5   PROT 8.2   ALBUMIN 4.1   BILITOT 1.4*   ALKPHOS 141   AST 19   ALT 17   ANIONGAP 15     Cardiac Markers: No results for input(s): "CKMB", "MYOGLOBIN", "BNP", "TROPISTAT" in the last 48 hours.  Coagulation:   Recent Labs   Lab 06/09/25  0103   INR >10.0*     Significant Imaging:   CT chest without contrast 6/9/25  Impression:  1. Severe upper lobe predominant centrilobular and paraseptal emphysema.  2. Basilar and peripheral predominant subpleural ground-glass reticular opacities with superimposed " "scattered nodular opacities.  Findings may reflect infectious/inflammatory process superimposed on emphysema versus interstitial lung disease.  Recommend continue follow-up.  3. Left adrenal nodule favored to represent a lipid rich adenoma.  If clinically warranted dedicated adrenal protocol imaging can be done, alternatively attention on follow-up.  Assessment/Plan:     Assessment & Plan  Supratherapeutic INR  INR >10  S/p vitamin K dose  Repeat INR and trend    HTN (hypertension)  Patient's blood pressure range in the last 24 hours was: BP  Min: 132/66  Max: 133/88.The patient's inpatient anti-hypertensive regimen is listed below:  Current Antihypertensives  amlodipine-benazepril 5-20 mg per capsule 2 capsule, Daily, Oral  spironolactone tablet 25 mg, Daily, Oral  metoprolol succinate (TOPROL-XL) 24 hr tablet 200 mg, Daily, Oral    Plan  - BP is controlled, no changes needed to their regimen  Hyperlipidemia LDL goal <70  Continue lipitor 40  Goal <70 in diabetic, smoker, CAD    Diabetes mellitus, type 2  Last A1c reviewed-   Lab Results   Component Value Date    HGBA1C 7.7 (H) 02/16/2018     Most recent fingerstick glucose reviewed- No results for input(s): "POCTGLUCOSE" in the last 24 hours.  Current correctional scale  Low  Repeat A1c  Consider adding SGLT2i  Maintain anti-hyperglycemic dose as follows-   Antihyperglycemics (From admission, onward)      Start     Stop Route Frequency Ordered    06/09/25 0607  insulin aspart U-100 pen 0-5 Units         -- SubQ Before meals & nightly PRN 06/09/25 0513          Hold Oral hypoglycemics while patient is in the hospital.  Tobacco abuse  Smoking cessation  PRN nicotine patch    LV (left ventricular) mural thrombus  Hold coumadin today, re-evaluate restarting at lower dose based on INR levels  Continue coumadin for LV thrombus when safe to do so.    History of MI (myocardial infarction)  On aspirin and atorvastatin  GDMT: spionolactone, metoprolol, benazepril  Repeat " echo outpatient given last available in 2016  Jardiance tried but had mild nausea/vomiting, try re-initiating    Hemoptysis  Minimal volume hemoptysis in setting of elevated INR  CT chest reviewed with chronic significant emphysema  Treat for COPD exacerbation: received antibiotics in the ED, continue doxycycline  -inhalers added, breathing treatments prn, prednisone 40mg x5 days  -referral to pulmonology outpatient  Smoking cessation    VTE Risk Mitigation (From admission, onward)           Ordered     warfarin (COUMADIN) tablet 5 mg  Daily         06/09/25 0513     Reason for No Pharmacological VTE Prophylaxis  Once        Question:  Reasons:  Answer:  Already adequately anticoagulated on oral Anticoagulants    06/09/25 0513     IP VTE HIGH RISK PATIENT  Once         06/09/25 0513     Place sequential compression device  Until discontinued         06/09/25 0513                       On 06/09/2025, patient should be placed in hospital observation services under my care.    Alphonso Rosen MD  Department of Hospital Medicine  [facility] - Emergency Dept

## 2025-06-09 NOTE — ASSESSMENT & PLAN NOTE
Patient's blood pressure range in the last 24 hours was: BP  Min: 132/66  Max: 133/88.The patient's inpatient anti-hypertensive regimen is listed below:  Current Antihypertensives  amlodipine-benazepril 5-20 mg per capsule 2 capsule, Daily, Oral  spironolactone tablet 25 mg, Daily, Oral  metoprolol succinate (TOPROL-XL) 24 hr tablet 200 mg, Daily, Oral    Plan  - BP is controlled, no changes needed to their regimen

## 2025-06-09 NOTE — ED PROVIDER NOTES
Encounter Date: 6/8/2025       History     Chief Complaint   Patient presents with    Epistaxis     X3 episodes this evening. +L eye redness, coughed up a small amount of blood last night.     Karen Mohan is a 69-year-old female presenting today for hemoptysis.  She coughed earlier and had dark red blood mixed with sputum.  This happened 2 additional times.  She also noticed that her left eye looked bloodshot earlier today.  She is on Coumadin, last INR check was 1 month ago.  She is a current smoker, denies chest pain or shortness of breath.  No leg swelling.  No recent fevers or chills.  Denies epistaxis        Review of patient's allergies indicates:   Allergen Reactions    Promethazine Anxiety    Ampicillin     Empagliflozin Nausea And Vomiting     Past Medical History:   Diagnosis Date    Diabetes mellitus     Heavy tobacco smoker     Hypercholesteremia     Hypertension     Miscarriage     Myocardial infarct x2    PVD (peripheral vascular disease)     Stroke      Past Surgical History:   Procedure Laterality Date    CARDIAC SURGERY      cardiac stent placement    CORONARY STENT PLACEMENT      TUBAL LIGATION       Family History   Problem Relation Name Age of Onset    Heart disease Mother      Early death Mother      Diabetes Maternal Aunt       Social History[1]  Review of Systems    Physical Exam     Initial Vitals [06/08/25 2329]   BP Pulse Resp Temp SpO2   133/88 (!) 120 18 98.2 °F (36.8 °C) (!) 94 %      MAP       --         Physical Exam    Nursing note and vitals reviewed.  Constitutional: She appears well-developed and well-nourished. No distress.   HENT: Mouth/Throat: Oropharynx is clear and moist.   Eyes: Left conjunctiva has a hemorrhage (Medial subconjunctival hemorrhage). No scleral icterus.   Neck: No JVD present.   Cardiovascular:  Normal rate, regular rhythm and intact distal pulses.           Pulmonary/Chest: Breath sounds normal. No respiratory distress. She has no wheezes. She has no rales.    Abdominal: Abdomen is soft. Bowel sounds are normal. She exhibits no distension. There is no abdominal tenderness. There is no rebound.   Musculoskeletal:         General: No edema.     Lymphadenopathy:     She has no cervical adenopathy.   Neurological: She is alert and oriented to person, place, and time.   Skin: Skin is warm. No rash noted.         ED Course   Procedures  Labs Reviewed   CBC WITH DIFFERENTIAL - Abnormal       Result Value    WBC 20.88 (*)     RBC 5.58 (*)     HGB 17.4 (*)     HCT 50.2 (*)     MCV 90      MCH 31.2 (*)     MCHC 34.7      RDW 14.2      Platelet Count 201      MPV 11.1      Nucleated RBC 0      Neut % 83.8 (*)     Lymph % 9.4 (*)     Mono % 5.4      Eos % 0.0      Basophil % 0.6      Imm Grans % 0.8 (*)     Neut # 17.49 (*)     Lymph # 1.96      Mono # 1.12 (*)     Eos # 0.01      Baso # 0.13      Imm Grans # 0.17 (*)    BASIC METABOLIC PANEL - Abnormal    Sodium 139      Potassium 3.3 (*)     Chloride 104      CO2 20 (*)     Glucose 319 (*)     BUN 23      Creatinine 0.8      Calcium 9.5      Anion Gap 15      eGFR >60     PROTIME-INR - Abnormal    PT >100.0 (*)     INR >10.0 (*)    HEPATIC FUNCTION PANEL - Abnormal    Protein Total 8.2      Albumin 4.1      Bilirubin Total 1.4 (*)     Bilirubin Direct 0.6 (*)           AST 19      ALT 17     URINALYSIS, REFLEX TO URINE CULTURE - Abnormal    Color, UA Yellow      Appearance, UA Hazy (*)     pH, UA 6.0      Spec Grav UA 1.010      Protein, UA 1+ (*)     Glucose, UA 4+ (*)     Ketones, UA 1+ (*)     Bilirubin, UA Negative      Blood, UA 2+ (*)     Nitrites, UA Negative      Urobilinogen, UA Negative      Leukocyte Esterase, UA Negative     HEMOGLOBIN A1C - Abnormal    Hemoglobin A1c 8.3 (*)     Estimated Average Glucose 192 (*)    LIPID PANEL - Abnormal    Cholesterol Total 99 (*)     Triglyceride 71      HDL Cholesterol 35 (*)     LDL Cholesterol 49.8 (*)     HDL/Cholesterol Ratio 35.4      Cholesterol/HDL Ratio 2.8      Non  HDL Cholesterol 64     URINALYSIS MICROSCOPIC - Abnormal    RBC, UA 21 (*)     WBC, UA 4      Bacteria, UA Few (*)     Yeast, UA None      Squamous Epithelial Cells, UA 5      Hyaline Casts, UA 1      Microscopic Comment       POCT GLUCOSE - Abnormal    POCT Glucose 302 (*)    POCT GLUCOSE - Abnormal    POCT Glucose 323 (*)    HEPATITIS C ANTIBODY - Normal    Hep C Ab Interp Non-Reactive     HIV 1 / 2 ANTIBODY - Normal    HIV 1/2 Ag/Ab Non-Reactive     PROCALCITONIN - Normal    Procalcitonin 0.10     PROCALCITONIN - Normal    Procalcitonin 0.02     HEP C VIRUS HOLD SPECIMEN    Extra Tube Hold for add-ons.     CBC W/ AUTO DIFFERENTIAL    Narrative:     The following orders were created for panel order CBC auto differential.  Procedure                               Abnormality         Status                     ---------                               -----------         ------                     CBC with Differential[[id number]]       Abnormal            Final result                 Please view results for these tests on the individual orders.   GREY TOP URINE HOLD    Extra Tube Hold for add-ons.     POCT GLUCOSE, HAND-HELD DEVICE   POCT GLUCOSE, HAND-HELD DEVICE   POCT GLUCOSE, HAND-HELD DEVICE     EKG Readings: (Independently Interpreted)   EKG: Sinus tachycardia at 109, right atrial enlargement, no acute ischemic changes     ECG Results              EKG 12-lead (Final result)        Collection Time Result Time QRS Duration [facility] QTC Calculation    06/08/25 23:36:50 06/09/25 08:13:06 80 455                     Final result by Interface, Lab In [facility] (06/09/25 08:13:12)                   Narrative:    Test Reason : R00.0,    Vent. Rate : 109 BPM     Atrial Rate : 109 BPM     P-R Int : 132 ms          QRS Dur :  80 ms      QT Int : 338 ms       P-R-T Axes :  78  49  78 degrees    QTcB Int : 455 ms    Sinus tachycardia  Right atrial enlargement  Probable  Inferior infarct (cited on or before 01-Aug-1995) but Qs  narrow  Anterolateral infarct (cited on or before 01-Aug-1995)  Abnormal ECG  When compared with ECG of 21-Aug-2022 20:30,  Vent. rate has increased by  37 bpm  Nonspecific T wave abnormality has replaced inverted T waves in Inferior  leads  Confirmed by Damion Ross ([id number]) on 6/9/2025 8:13:03 AM    Referred By: AAAREFERRAL SELF           Confirmed By: Damion Ross                                  Imaging Results              CT Chest With Contrast (Final result)  Result time 06/09/25 04:24:48      Final result by Camacho Torres MD (06/09/25 04:24:48)                   Impression:      1. Severe upper lobe predominant centrilobular and paraseptal emphysema.  2. Basilar and peripheral predominant subpleural ground-glass reticular opacities with superimposed scattered nodular opacities.  Findings may reflect infectious/inflammatory process superimposed on emphysema versus interstitial lung disease.  Recommend continue follow-up.  3. Left adrenal nodule favored to represent a lipid rich adenoma.  If clinically warranted dedicated adrenal protocol imaging can be done, alternatively attention on follow-up.    Electronically signed by resident: Yo Gomez  Date:    06/09/2025  Time:    02:48    Electronically signed by: Camacho Torres  Date:    06/09/2025  Time:    04:24               Narrative:    EXAMINATION:  CT CHEST WITH CONTRAST    CLINICAL HISTORY:  Hemoptysis    TECHNIQUE:  Low dose axial images, sagittal and coronal reformations were obtained from the thoracic inlet to the lung bases following the IV administration of 75 mL of Omnipaque 350.    COMPARISON:  Same day chest x-ray, report of CT chest 12/15/2023    FINDINGS:  SOFT TISSUES: No axillary or subpectoral lymphadenopathy. Few subcentimeter hypodense thyroid nodules.    HEART AND MEDIASTINUM: No mediastinal or hilar lymphadenopathy. Heart is normal size.  No pericardial effusion.  Multi-vessel coronary artery calcific atherosclerosis.  Aortic and mitral  annular calcification.  Left-sided aortic arch. Pulmonary trunk is normal in size. No intraluminal filling defects within the central pulmonary arterial system to suggest central pulmonary thromboembolism on this non-dedicated study.    LUNGS, PLEURA, AND AIRWAYS: Airways are patent. Severe upper lobe predominant centrilobular and paraseptal emphysema.  Basal and peripheral predominant subpleural ground-glass and reticular opacities with scattered nodular opacities.No pleural effusion or pneumothorax.    UPPER ABDOMEN: Cholecystectomy.  2.9 cm heterogeneous left adrenal nodule with areas of low attenuation.    BONES: No fracture or focal osseous lesion.                        Preliminary result by oY Gomez MD (06/09/25 02:59:47)                   Impression:      1. Severe upper lobe predominant centrilobular and paraseptal emphysema.  2. Basilar and peripheral predominant subpleural ground-glass reticular opacities with superimposed scattered nodular opacities.  Findings may reflect infectious/inflammatory process superimposed on emphysema versus interstitial lung disease.  Recommend continue follow-up.  3. Left adrenal nodule favored to represent a lipid rich adenoma.  Attention on follow-up.    Electronically signed by resident: Yo Gomez  Date:    06/09/2025  Time:    02:48                 Narrative:    EXAMINATION:  CT CHEST WITH CONTRAST    CLINICAL HISTORY:  Hemoptysis;    TECHNIQUE:  Low dose axial images, sagittal and coronal reformations were obtained from the thoracic inlet to the lung bases following the IV administration of 75 mL of Omnipaque 350.    COMPARISON:  Same day chest x-ray, report of CT chest 12/15/2023    FINDINGS:  SOFT TISSUES: No axillary or subpectoral lymphadenopathy. Few subcentimeter hypodense thyroid nodules.    HEART AND MEDIASTINUM: No mediastinal or hilar lymphadenopathy. Heart is normal size.  No pericardial effusion.  Multi-vessel coronary artery calcific  atherosclerosis.  Aortic and mitral annular calcification.  Left-sided aortic arch. Pulmonary trunk is normal in size. No intraluminal filling defects within the central pulmonary arterial system to suggest central pulmonary thromboembolism on this non-dedicated study.    LUNGS, PLEURA, AND AIRWAYS: Airways are patent. Severe upper lobe predominant centrilobular and paraseptal emphysema.  Basal and peripheral predominant subpleural ground-glass and reticular opacities with scattered nodular opacities.No pleural effusion or pneumothorax.    UPPER ABDOMEN: Cholecystectomy.  2.9 cm heterogeneous left adrenal nodule with areas of low attenuation.    BONES: No fracture or focal osseous lesion.                                        X-Ray Chest PA And Lateral (Final result)  Result time 06/09/25 01:34:51      Final result by Keven Downing MD (06/09/25 01:34:51)                   Impression:      Basilar predominant interstitial and reticular opacities new since 2020.  Differential considerations include interstitial edema, interstitial pneumonia or pneumonitis, drug reaction, or less likely pulmonary venous hypertension.    This report was flagged in Epic as abnormal.    Electronically signed by resident: Yo Gomez  Date:    06/09/2025  Time:    01:11    Electronically signed by: Keven Downing  Date:    06/09/2025  Time:    01:34               Narrative:    EXAMINATION:  XR CHEST PA AND LATERAL    CLINICAL HISTORY:  Shortness of breath    TECHNIQUE:  PA and lateral views of the chest were performed.    COMPARISON:  Chest x-ray 11/02/2020, report of CT chest 12/15/2023 (images unavailable).    FINDINGS:  Clothing artifacts.    Lungs are symmetrically expanded.  Interstitial and reticular opacities throughout bilateral lung bases with a basilar predominance.  No pleural effusion or pneumothorax.    Cardiac silhouette is normal size.  Aortic atherosclerosis.    Bones are intact.  Fracture.  Cholecystectomy  clips.                        Preliminary result by Yo Gomez MD (06/09/25 01:15:16)                   Impression:      Basilar predominant interstitial and reticular opacities suggestive of interstitial lung disease.    Electronically signed by resident: Yo Gomez  Date:    06/09/2025  Time:    01:11                 Narrative:    EXAMINATION:  XR CHEST PA AND LATERAL    CLINICAL HISTORY:  Shortness of breath    TECHNIQUE:  PA and lateral views of the chest were performed.    COMPARISON:  Chest x-ray 11/02/2020, report of CT chest 12/15/2023 (images unavailable).    FINDINGS:  Clothing artifacts.    Lungs are symmetrically expanded.  Interstitial and reticular opacities throughout bilateral lung bases with a basilar predominance.  No pleural effusion or pneumothorax.    Cardiac silhouette is normal size.  Aortic atherosclerosis.    Bones are intact.  Fracture.  Cholecystectomy clips.                                    X-Rays:   Independently Interpreted Readings:   Other Readings:  Chest x-ray reviewed by me and shows bilateral interstitial changes, no focal consolidation or effusion    Medications   albuterol inhaler 1 puff (has no administration in time range)   amlodipine-benazepril 5-20 mg per capsule 2 capsule (2 capsules Oral Given 6/10/25 1002)   atorvastatin tablet 80 mg (80 mg Oral Given 6/10/25 0910)   aspirin EC tablet 81 mg (81 mg Oral Given 6/10/25 0910)   spironolactone tablet 25 mg (25 mg Oral Given 6/10/25 0910)   metoprolol succinate (TOPROL-XL) 24 hr tablet 200 mg (200 mg Oral Given 6/10/25 0910)   sodium chloride 0.9% flush 10 mL (has no administration in time range)   insulin aspart U-100 pen 0-5 Units (5 Units Subcutaneous Given 6/10/25 1157)   acetaminophen tablet 650 mg (has no administration in time range)   naloxone 0.4 mg/mL injection 0.02 mg (has no administration in time range)   glucose chewable tablet 16 g (has no administration in time range)   glucose chewable tablet 24 g  (has no administration in time range)   dextrose 50% injection 12.5 g (has no administration in time range)   dextrose 50% injection 25 g (has no administration in time range)   glucagon (human recombinant) injection 1 mg (has no administration in time range)   nicotine 14 mg/24 hr 1 patch (1 patch Transdermal Patch Applied 6/10/25 0910)   sodium chloride 0.9% flush 3 mL (has no administration in time range)   fluticasone furoate-vilanteroL 100-25 mcg/dose diskus inhaler 1 puff (1 puff Inhalation Given 6/10/25 0813)   doxycycline tablet 100 mg (100 mg Oral Given 6/10/25 0910)   albuterol-ipratropium 2.5 mg-0.5 mg/3 mL nebulizer solution 3 mL (has no administration in time range)   acetaminophen tablet 1,000 mg (1,000 mg Oral Given 6/9/25 0113)   phytonadione (vitamin K1) tablet 5 mg (5 mg Oral Given 6/9/25 0215)   cefTRIAXone injection 1 g (1 g Intravenous Given 6/9/25 0237)   azithromycin (ZITHROMAX) 500 mg in 0.9% NaCl 250 mL IVPB (admixture device) (0 mg Intravenous Stopped 6/9/25 0348)   iohexoL (OMNIPAQUE 350) injection 75 mL (75 mLs Intravenous Given 6/9/25 0222)   potassium chloride SA CR tablet 40 mEq (40 mEq Oral Given 6/9/25 1554)   phytonadione vitamin k (AQUA-MEPHYTON) 5 mg in D5W 50 mL IVPB (0 mg Intravenous Stopped 6/10/25 1056)     Medical Decision Making  Urgent evaluation a 69-year-old female presenting today for left eye subconjunctival hemorrhage and small volume hemoptysis ( 3 coughs).    Vital signs stable.  Overall well-appearing, no acute distress.    Differential includes not limited to pneumonia, bronchitis, lung mass, pulmonary hemorrhage, subconjunctival hemorrhage.  Low suspicion for PE as patient is on Coumadin.      Plan for labs, x-ray, monitoring.    Chest x-ray reviewed, concerning for interstitial lung disease.  Given hemoptysis, plan for CT to evaluate for pulmonary hemorrhage.    Patient turned over to oncoming team pending labs, CT chest, re-evaluation and final  "disposition.      Amount and/or Complexity of Data Reviewed  Labs: ordered. Decision-making details documented in ED Course.  Radiology: ordered and independent interpretation performed.  ECG/medicine tests: ordered and independent interpretation performed.    Risk  OTC drugs.  Prescription drug management.              Attending Attestation:         Attending Critical Care:   Critical Care Times:   ==============================================================  Total Critical Care Time - exclusive of procedural time: 35 minutes.  ==============================================================  Critical care reasons: supratherapeutic INR.   Critical care was time spent personally by me on the following activities: development of treatment plan with patient or relative, ordering and performing treatments and interventions, evaluation of patient's response to treatment and re-evaluation of patient's conition.           ED Course as of 06/10/25 145   Sun Jun 08, 2025   4698 No STEMI on EKG [NN]      ED Course User Index  [NN] Duyen Maher MD    Sepsis Perfusion Assessment: "I attest a sepsis perfusion exam was performed within 6 hours of sepsis, severe sepsis, or septic shock presentation, following fluid resuscitation."                      Clinical Impression:  Final diagnoses:  [R00.0] Tachycardia  [R06.02] SOB (shortness of breath)  [R04.2] Hemoptysis (Primary)  [H11.32] Subconjunctival hemorrhage, left  [Z13.6] Screening for cardiovascular condition  [R79.1] Supratherapeutic INR          ED Disposition Condition    Observation                       [1]   Social History  Tobacco Use    Smoking status: Every Day     Current packs/day: 0.50     Average packs/day: 0.5 packs/day for 40.0 years (20.0 ttl pk-yrs)     Types: Cigarettes    Smokeless tobacco: Never   Substance Use Topics    Alcohol use: Yes     Comment: occasionally    Drug use: No        Sapphire Wolff MD  06/10/25 0327    "

## 2025-06-09 NOTE — SUBJECTIVE & OBJECTIVE
"Past Medical History:   Diagnosis Date    Diabetes mellitus     Heavy tobacco smoker     Hypercholesteremia     Hypertension     Miscarriage     Myocardial infarct x2    PVD (peripheral vascular disease)     Stroke        Past Surgical History:   Procedure Laterality Date    CARDIAC SURGERY      cardiac stent placement    CORONARY STENT PLACEMENT      TUBAL LIGATION         Review of patient's allergies indicates:   Allergen Reactions    Promethazine Anxiety    Ampicillin     Empagliflozin Nausea And Vomiting       No current facility-administered medications on file prior to encounter.     Current Outpatient Medications on File Prior to Encounter   Medication Sig    albuterol (PROVENTIL/VENTOLIN HFA) 90 mcg/actuation inhaler Inhale 1 puff into the lungs every 4 (four) hours as needed for Wheezing. Rescue    amlodipine-benazepril (LOTREL) 10-40 mg per capsule Take 1 capsule by mouth once daily.    aspirin (ECOTRIN) 81 MG EC tablet Take 81 mg by mouth once daily.    atorvastatin (LIPITOR) 80 MG tablet Take 1 tablet (80 mg total) by mouth once daily.    blood pressure test kit-large Kit Use as directed    fluticasone propionate (FLOVENT DISKUS) 50 mcg/actuation DsDv Inhale into the lungs. Controller    glimepiride (AMARYL) 4 MG tablet Take 4 mg by mouth before breakfast.    insulin glargine (LANTUS) 100 unit/mL injection Inject 36 Units into the skin every evening.    ketorolac 0.4% (ACULAR) 0.4 % Drop     metformin (GLUCOPHAGE) 1000 MG tablet Take 1,000 mg by mouth 2 (two) times daily with meals.    metoprolol succinate (TOPROL-XL) 200 MG 24 hr tablet Take 200 mg by mouth once daily.    nitroGLYCERIN (NITROSTAT) 0.4 MG SL tablet Place 1 tablet (0.4 mg total) under the tongue every 5 (five) minutes as needed for Chest pain (Do not take more than three doses at one time. If having chest pain, report to nearest emergency room.). (Patient not taking: No sig reported)    pen needle, diabetic 31 gauge x 1/4" Ndle     " prednisoLONE acetate (PRED FORTE) 1 % DrpS     spironolactone (ALDACTONE) 25 MG tablet Take 25 mg by mouth once daily.    TRESIBA FLEXTOUCH U-200 200 unit/mL (3 mL) InPn     TRUE METRIX GLUCOSE TEST STRIP Strp     vitamin D 1000 units Tab Take 185 mg by mouth once daily.    warfarin (COUMADIN) 5 MG tablet Take 5 mg by mouth every Tues, Thurs, Sat.    warfarin (COUMADIN) 7.5 MG tablet Take 7.5 mg by mouth every Mon, Wed, Fri.      Family History       Problem Relation (Age of Onset)    Diabetes Maternal Aunt    Early death Mother    Heart disease Mother          Tobacco Use    Smoking status: Every Day     Current packs/day: 0.50     Average packs/day: 0.5 packs/day for 40.0 years (20.0 ttl pk-yrs)     Types: Cigarettes    Smokeless tobacco: Never   Substance and Sexual Activity    Alcohol use: Yes     Comment: occasionally    Drug use: No    Sexual activity: Yes     Partners: Male     Birth control/protection: None     Review of Systems   Constitutional:  Negative for chills, diaphoresis, fatigue and fever.   HENT:  Negative for congestion, nosebleeds, postnasal drip, sore throat and trouble swallowing.    Eyes:  Positive for redness. Negative for visual disturbance.   Respiratory:  Positive for cough. Negative for apnea, chest tightness, shortness of breath and wheezing.    Cardiovascular:  Negative for chest pain, palpitations and leg swelling.   Gastrointestinal:  Negative for abdominal distention, abdominal pain, blood in stool, constipation, diarrhea, nausea and vomiting.   Genitourinary:  Negative for difficulty urinating, flank pain and hematuria.   Musculoskeletal:  Negative for arthralgias and myalgias.   Skin:  Negative for pallor and rash.   Neurological:  Negative for dizziness, syncope, facial asymmetry, weakness, light-headedness, numbness and headaches.   Psychiatric/Behavioral:  Negative for agitation. The patient is not nervous/anxious.      Objective:     Vital Signs (Most Recent):  Temp: 98.2 °F  (36.8 °C) (06/08/25 2329)  Pulse: 99 (06/09/25 0500)  Resp: 18 (06/08/25 2329)  BP: 132/66 (06/09/25 0245)  SpO2: 95 % (06/09/25 0500) Vital Signs (24h Range):  Temp:  [98.2 °F (36.8 °C)] 98.2 °F (36.8 °C)  Pulse:  [] 99  Resp:  [18] 18  SpO2:  [94 %-96 %] 95 %  BP: (132-133)/(66-88) 132/66     Weight: 61.2 kg (135 lb)  Body mass index is 21.14 kg/m².     Physical Exam  Vitals and nursing note reviewed.   Constitutional:       General: She is not in acute distress.     Appearance: Normal appearance. She is not diaphoretic.   HENT:      Head: Normocephalic and atraumatic.   Eyes:      General: No scleral icterus.        Left eye: Discharge (conjunctival hemorrhage) present.     Pupils: Pupils are equal, round, and reactive to light.   Cardiovascular:      Rate and Rhythm: Normal rate and regular rhythm.      Heart sounds: No murmur heard.  Pulmonary:      Effort: Respiratory distress present.      Breath sounds: Wheezing present.      Comments: Reduced breath sounds bilateral upper lobes. Expiratory wheezing  Chest:      Chest wall: No tenderness.   Abdominal:      General: Abdomen is flat. There is no distension.      Palpations: Abdomen is soft. There is no mass.      Tenderness: There is no abdominal tenderness. There is no guarding.   Musculoskeletal:         General: No swelling or tenderness.   Skin:     General: Skin is warm and dry.      Capillary Refill: Capillary refill takes less than 2 seconds.      Coloration: Skin is not jaundiced.      Findings: No rash.   Neurological:      General: No focal deficit present.      Mental Status: She is alert and oriented to person, place, and time.   Psychiatric:         Mood and Affect: Mood normal.         Thought Content: Thought content normal.     Significant Labs: All pertinent labs within the past 24 hours have been reviewed.  CBC:   Recent Labs   Lab 06/09/25  0103 06/09/25  0349   WBC 20.88*  --    HGB 17.4*  --    HCT 50.2* 50.0     --      CMP:  "  Recent Labs   Lab 06/09/25  0103      K 3.3*      CO2 20*   *   BUN 23   CREATININE 0.8   CALCIUM 9.5   PROT 8.2   ALBUMIN 4.1   BILITOT 1.4*   ALKPHOS 141   AST 19   ALT 17   ANIONGAP 15     Cardiac Markers: No results for input(s): "CKMB", "MYOGLOBIN", "BNP", "TROPISTAT" in the last 48 hours.  Coagulation:   Recent Labs   Lab 06/09/25  0103   INR >10.0*     Significant Imaging:   CT chest without contrast 6/9/25  Impression:  1. Severe upper lobe predominant centrilobular and paraseptal emphysema.  2. Basilar and peripheral predominant subpleural ground-glass reticular opacities with superimposed scattered nodular opacities.  Findings may reflect infectious/inflammatory process superimposed on emphysema versus interstitial lung disease.  Recommend continue follow-up.  3. Left adrenal nodule favored to represent a lipid rich adenoma.  If clinically warranted dedicated adrenal protocol imaging can be done, alternatively attention on follow-up.  "

## 2025-06-09 NOTE — ASSESSMENT & PLAN NOTE
On aspirin and atorvastatin  GDMT: spionolactone, metoprolol, benazepril  Repeat echo outpatient given last available in 2016  Jardiance tried but had mild nausea/vomiting, try re-initiating

## 2025-06-09 NOTE — HPI
Karen Mohan is a 69-year-old female with a PMHx of CAD s/p MI with LV thrombus on coumadin, HLD, HTN, DM2, tobacco use, emphysema presenting today for small volume hemoptysis. She coughed earlier and had dark red blood mixed with sputum. This happened 2 additional times. She also noticed that her left eye looked bloodshot earlier today prior to coughing. No preceding productive cough, fever/chills, sick contacts. She is on Coumadin, last INR check was 1 month ago. She is a current smoker, denies chest pain or shortness of breath. No leg swelling. No recent fevers or chills. Denies epistaxis.     H/H elevated in the setting of chronic emphysema. INR supratherapeutic >10. No recent trauma, melena, or hematuria.

## 2025-06-09 NOTE — ASSESSMENT & PLAN NOTE
"Last A1c reviewed-   Lab Results   Component Value Date    HGBA1C 7.7 (H) 02/16/2018     Most recent fingerstick glucose reviewed- No results for input(s): "POCTGLUCOSE" in the last 24 hours.  Current correctional scale  Low  Repeat A1c  Consider adding SGLT2i  Maintain anti-hyperglycemic dose as follows-   Antihyperglycemics (From admission, onward)      Start     Stop Route Frequency Ordered    06/09/25 0607  insulin aspart U-100 pen 0-5 Units         -- SubQ Before meals & nightly PRN 06/09/25 0513          Hold Oral hypoglycemics while patient is in the hospital.  "

## 2025-06-09 NOTE — ASSESSMENT & PLAN NOTE
Minimal volume hemoptysis in setting of elevated INR  CT chest reviewed with chronic significant emphysema  Treat for COPD exacerbation: received antibiotics in the ED, continue doxycycline  -inhalers added, breathing treatments prn, prednisone 40mg x5 days  -referral to pulmonology outpatient  Smoking cessation

## 2025-06-09 NOTE — ED TRIAGE NOTES
Karen Mohan, a 69 y.o. female presents to the ED w/ complaint of left eye redness since yesterday. Pt states she coughed a streak of dark red blood today. Pt denies chest pain, SOB. Pt denies any recent illness. Pt is AAOx4, GCS 15.     Triage note:  Chief Complaint   Patient presents with    Epistaxis     X3 episodes this evening. +L eye redness, coughed up a small amount of blood last night.     Review of patient's allergies indicates:   Allergen Reactions    Promethazine Anxiety    Ampicillin     Empagliflozin Nausea And Vomiting     Past Medical History:   Diagnosis Date    Diabetes mellitus     Heavy tobacco smoker     Hypercholesteremia     Hypertension     Miscarriage     Myocardial infarct x2    PVD (peripheral vascular disease)     Stroke

## 2025-06-09 NOTE — ED NOTES
Bed: 18  Expected date:   Expected time:   Means of arrival:   Comments:  Ccr 1   Having bm but not aware.

## 2025-06-09 NOTE — ASSESSMENT & PLAN NOTE
Hold coumadin today, re-evaluate restarting at lower dose based on INR levels  Continue coumadin for LV thrombus when safe to do so.

## 2025-06-09 NOTE — PROVIDER PROGRESS NOTES - EMERGENCY DEPT.
Results for orders placed or performed during the hospital encounter of 06/09/25   CBC with Differential    Collection Time: 06/09/25  1:03 AM   Result Value Ref Range    WBC 20.88 (H) 3.90 - 12.70 K/uL    RBC 5.58 (H) 4.00 - 5.40 M/uL    HGB 17.4 (H) 12.0 - 16.0 gm/dL    HCT 50.2 (H) 37.0 - 48.5 %    MCV 90 82 - 98 fL    MCH 31.2 (H) 27.0 - 31.0 pg    MCHC 34.7 32.0 - 36.0 g/dL    RDW 14.2 11.5 - 14.5 %    Platelet Count 201 150 - 450 K/uL    MPV 11.1 9.2 - 12.9 fL    Nucleated RBC 0 <=0 /100 WBC    Neut % 83.8 (H) 38 - 73 %    Lymph % 9.4 (L) 18 - 48 %    Mono % 5.4 4 - 15 %    Eos % 0.0 <=8 %    Basophil % 0.6 <=1.9 %    Imm Grans % 0.8 (H) 0.0 - 0.5 %    Neut # 17.49 (H) 1.8 - 7.7 K/uL    Lymph # 1.96 1 - 4.8 K/uL    Mono # 1.12 (H) 0.3 - 1 K/uL    Eos # 0.01 <=0.5 K/uL    Baso # 0.13 <=0.2 K/uL    Imm Grans # 0.17 (H) 0.00 - 0.04 K/uL   Basic metabolic panel    Collection Time: 06/09/25  1:03 AM   Result Value Ref Range    Sodium 139 136 - 145 mmol/L    Potassium 3.3 (L) 3.5 - 5.1 mmol/L    Chloride 104 95 - 110 mmol/L    CO2 20 (L) 23 - 29 mmol/L    Glucose 319 (H) 70 - 110 mg/dL    BUN 23 8 - 23 mg/dL    Creatinine 0.8 0.5 - 1.4 mg/dL    Calcium 9.5 8.7 - 10.5 mg/dL    Anion Gap 15 8 - 16 mmol/L    eGFR >60 >60 mL/min/1.73/m2   Protime-INR    Collection Time: 06/09/25  1:03 AM   Result Value Ref Range    PT >100.0 (H) 9.0 - 12.5 seconds    INR >10.0 (HH) 0.8 - 1.2       69-year-old female signed out pending laboratory studies.  Briefly she began having hemoptysis in his subconjunctival hemorrhage, on Coumadin.  INR severely elevated at greater than 10.  No signs of active bleeding in the ER with stable vital signs, given vitamin K. Chest x-ray with interstitial opacities, CBC revealing a new leukocytosis, septic workup initiated, will start azithromycin and Rocephin with concern for possible lower respiratory infection.  CT chest ordered, patient to be admitted to Hospital Medicine.

## 2025-06-09 NOTE — PHARMACY MED REC
"  Admission Medication History     The home medication history was taken by Lea Mullen.    You may go to "Admission" then "Reconcile Home Medications" tabs to review and/or act upon these items.     The home medication list has been updated by the Pharmacy department.   Please read ALL comments highlighted in yellow.   Please address this information as you see fit.    Feel free to contact us if you have any questions or require assistance.      The medications listed below were removed from the home medication list. Please reorder if appropriate:  Patient reports no longer taking the following medication(s):  FLOVENT 50 MCG/ACTUATION DSDV  GLIMEPIRIDE 4 MG  LANTUS 100 UNIT.ML INJECTION  KETOROLAC 0.4% DROP  PREDNISOLONE 1% DROPS  SPIRONOLACTONE 25 MG  VITAMIN D 1000 UNIT  WARFARIN 5 MG  WARFARIN 7.5 MG    Medications listed below were obtained from: Patient/family and Analytic software- [employer]  Current Outpatient Medications on File Prior to Encounter   Medication Sig    albuterol (PROVENTIL/VENTOLIN HFA) 90 mcg/actuation inhaler Inhale 1 puff into the lungs every 4 (four) hours as needed for Wheezing. Rescue    amlodipine-benazepril (LOTREL) 10-40 mg per capsule Take 1 capsule by mouth once daily.    aspirin (ECOTRIN) 81 MG EC tablet Take 81 mg by mouth once daily.    atorvastatin (LIPITOR) 80 MG tablet Take 1 tablet (80 mg total) by mouth once daily.    calcium carb/vit D3/minerals (CALTRATE PLUS ORAL) Take 1 tablet by mouth Daily.    metformin (GLUCOPHAGE) 1000 MG tablet Take 1,000 mg by mouth 2 (two) times daily with meals.      metoprolol succinate (TOPROL-XL) 200 MG 24 hr tablet Take 200 mg by mouth once daily.    TRESIBA FLEXTOUCH U-200 200 unit/mL (3 mL) InPn Inject 30 Units into the skin once daily.  LAST FILLED 04/14 FOR 42 DAY SUPPLY      warfarin (COUMADIN) 10 MG tablet Take 10 mg by mouth Daily.    nitroGLYCERIN (NITROSTAT) 0.4 MG SL tablet Place 1 tablet (0.4 mg total) under the tongue every 5 " (five) minutes as needed for Chest pain (Do not take more than three doses at one time. If having chest pain, report to nearest emergency room.).            Potential issues to be addressed PRIOR TO DISCHARGE  Please discuss with the patient barriers to adherence with medication treatment plans  Patient requires education regarding drug therapies     Lea Mullen  EXT [id number]                .

## 2025-06-10 LAB
ABSOLUTE EOSINOPHIL (OHS): 0.01 K/UL
ABSOLUTE EOSINOPHIL (OHS): 0.08 K/UL
ABSOLUTE MONOCYTE (OHS): 1.16 K/UL (ref 0.3–1)
ABSOLUTE MONOCYTE (OHS): 1.33 K/UL (ref 0.3–1)
ABSOLUTE NEUTROPHIL COUNT (OHS): 14.66 K/UL (ref 1.8–7.7)
ABSOLUTE NEUTROPHIL COUNT (OHS): 15.33 K/UL (ref 1.8–7.7)
ALBUMIN SERPL BCP-MCNC: 3.8 G/DL (ref 3.5–5.2)
ALP SERPL-CCNC: 125 UNIT/L (ref 40–150)
ALT SERPL W/O P-5'-P-CCNC: 18 UNIT/L (ref 10–44)
ANION GAP (OHS): 12 MMOL/L (ref 8–16)
AST SERPL-CCNC: 24 UNIT/L (ref 11–45)
BASOPHILS # BLD AUTO: 0.12 K/UL
BASOPHILS # BLD AUTO: 0.16 K/UL
BASOPHILS NFR BLD AUTO: 0.6 %
BASOPHILS NFR BLD AUTO: 0.9 %
BILIRUB SERPL-MCNC: 1.6 MG/DL (ref 0.1–1)
BUN SERPL-MCNC: 29 MG/DL (ref 8–23)
CALCIUM SERPL-MCNC: 9.2 MG/DL (ref 8.7–10.5)
CHLORIDE SERPL-SCNC: 106 MMOL/L (ref 95–110)
CO2 SERPL-SCNC: 19 MMOL/L (ref 23–29)
CREAT SERPL-MCNC: 0.7 MG/DL (ref 0.5–1.4)
ERYTHROCYTE [DISTWIDTH] IN BLOOD BY AUTOMATED COUNT: 14.6 % (ref 11.5–14.5)
ERYTHROCYTE [DISTWIDTH] IN BLOOD BY AUTOMATED COUNT: 14.6 % (ref 11.5–14.5)
GFR SERPLBLD CREATININE-BSD FMLA CKD-EPI: >60 ML/MIN/1.73/M2
GLUCOSE SERPL-MCNC: 295 MG/DL (ref 70–110)
HCT VFR BLD AUTO: 47.1 % (ref 37–48.5)
HCT VFR BLD AUTO: 50 % (ref 37–48.5)
HGB BLD-MCNC: 16 GM/DL (ref 12–16)
HGB BLD-MCNC: 16.2 GM/DL (ref 12–16)
IMM GRANULOCYTES # BLD AUTO: 0.17 K/UL (ref 0–0.04)
IMM GRANULOCYTES # BLD AUTO: 0.18 K/UL (ref 0–0.04)
IMM GRANULOCYTES NFR BLD AUTO: 0.9 % (ref 0–0.5)
IMM GRANULOCYTES NFR BLD AUTO: 0.9 % (ref 0–0.5)
INR PPP: >10 (ref 0.8–1.2)
LYMPHOCYTES # BLD AUTO: 2.32 K/UL (ref 1–4.8)
LYMPHOCYTES # BLD AUTO: 3.29 K/UL (ref 1–4.8)
MAGNESIUM SERPL-MCNC: 1.8 MG/DL (ref 1.6–2.6)
MCH RBC QN AUTO: 30.7 PG (ref 27–31)
MCH RBC QN AUTO: 31.7 PG (ref 27–31)
MCHC RBC AUTO-ENTMCNC: 32 G/DL (ref 32–36)
MCHC RBC AUTO-ENTMCNC: 34.4 G/DL (ref 32–36)
MCV RBC AUTO: 92 FL (ref 82–98)
MCV RBC AUTO: 96 FL (ref 82–98)
NUCLEATED RBC (/100WBC) (OHS): 0 /100 WBC
NUCLEATED RBC (/100WBC) (OHS): 0 /100 WBC
PHOSPHATE SERPL-MCNC: 1.8 MG/DL (ref 2.7–4.5)
PLATELET # BLD AUTO: 175 K/UL (ref 150–450)
PLATELET # BLD AUTO: 205 K/UL (ref 150–450)
PMV BLD AUTO: 11 FL (ref 9.2–12.9)
PMV BLD AUTO: 11.3 FL (ref 9.2–12.9)
POCT GLUCOSE: 245 MG/DL (ref 70–110)
POCT GLUCOSE: 286 MG/DL (ref 70–110)
POCT GLUCOSE: 332 MG/DL (ref 70–110)
POCT GLUCOSE: 358 MG/DL (ref 70–110)
POTASSIUM SERPL-SCNC: 3.9 MMOL/L (ref 3.5–5.1)
PROT SERPL-MCNC: 7.3 GM/DL (ref 6–8.4)
PROTHROMBIN TIME: >100 SECONDS (ref 9–12.5)
RBC # BLD AUTO: 5.11 M/UL (ref 4–5.4)
RBC # BLD AUTO: 5.21 M/UL (ref 4–5.4)
RELATIVE EOSINOPHIL (OHS): 0.1 %
RELATIVE EOSINOPHIL (OHS): 0.4 %
RELATIVE LYMPHOCYTE (OHS): 12.4 % (ref 18–48)
RELATIVE LYMPHOCYTE (OHS): 16.3 % (ref 18–48)
RELATIVE MONOCYTE (OHS): 5.8 % (ref 4–15)
RELATIVE MONOCYTE (OHS): 7.1 % (ref 4–15)
RELATIVE NEUTROPHIL (OHS): 76 % (ref 38–73)
RELATIVE NEUTROPHIL (OHS): 78.6 % (ref 38–73)
SODIUM SERPL-SCNC: 137 MMOL/L (ref 136–145)
WBC # BLD AUTO: 18.65 K/UL (ref 3.9–12.7)
WBC # BLD AUTO: 20.16 K/UL (ref 3.9–12.7)

## 2025-06-10 PROCEDURE — 94761 N-INVAS EAR/PLS OXIMETRY MLT: CPT

## 2025-06-10 PROCEDURE — 63600175 PHARM REV CODE 636 W HCPCS: Performed by: STUDENT IN AN ORGANIZED HEALTH CARE EDUCATION/TRAINING PROGRAM

## 2025-06-10 PROCEDURE — 85025 COMPLETE CBC W/AUTO DIFF WBC: CPT | Mod: 91 | Performed by: INTERNAL MEDICINE

## 2025-06-10 PROCEDURE — 36415 COLL VENOUS BLD VENIPUNCTURE: CPT | Performed by: INTERNAL MEDICINE

## 2025-06-10 PROCEDURE — 96372 THER/PROPH/DIAG INJ SC/IM: CPT | Performed by: STUDENT IN AN ORGANIZED HEALTH CARE EDUCATION/TRAINING PROGRAM

## 2025-06-10 PROCEDURE — 63600175 PHARM REV CODE 636 W HCPCS: Performed by: INTERNAL MEDICINE

## 2025-06-10 PROCEDURE — 25000242 PHARM REV CODE 250 ALT 637 W/ HCPCS: Performed by: STUDENT IN AN ORGANIZED HEALTH CARE EDUCATION/TRAINING PROGRAM

## 2025-06-10 PROCEDURE — 36415 COLL VENOUS BLD VENIPUNCTURE: CPT | Performed by: STUDENT IN AN ORGANIZED HEALTH CARE EDUCATION/TRAINING PROGRAM

## 2025-06-10 PROCEDURE — 94799 UNLISTED PULMONARY SVC/PX: CPT

## 2025-06-10 PROCEDURE — 84100 ASSAY OF PHOSPHORUS: CPT | Performed by: STUDENT IN AN ORGANIZED HEALTH CARE EDUCATION/TRAINING PROGRAM

## 2025-06-10 PROCEDURE — G0378 HOSPITAL OBSERVATION PER HR: HCPCS

## 2025-06-10 PROCEDURE — 25000003 PHARM REV CODE 250: Performed by: STUDENT IN AN ORGANIZED HEALTH CARE EDUCATION/TRAINING PROGRAM

## 2025-06-10 PROCEDURE — 96367 TX/PROPH/DG ADDL SEQ IV INF: CPT

## 2025-06-10 PROCEDURE — 85610 PROTHROMBIN TIME: CPT | Performed by: STUDENT IN AN ORGANIZED HEALTH CARE EDUCATION/TRAINING PROGRAM

## 2025-06-10 PROCEDURE — 80053 COMPREHEN METABOLIC PANEL: CPT | Performed by: STUDENT IN AN ORGANIZED HEALTH CARE EDUCATION/TRAINING PROGRAM

## 2025-06-10 PROCEDURE — S4991 NICOTINE PATCH NONLEGEND: HCPCS | Performed by: STUDENT IN AN ORGANIZED HEALTH CARE EDUCATION/TRAINING PROGRAM

## 2025-06-10 PROCEDURE — 87324 CLOSTRIDIUM AG IA: CPT | Performed by: PHYSICIAN ASSISTANT

## 2025-06-10 PROCEDURE — 85025 COMPLETE CBC W/AUTO DIFF WBC: CPT | Performed by: STUDENT IN AN ORGANIZED HEALTH CARE EDUCATION/TRAINING PROGRAM

## 2025-06-10 PROCEDURE — 25000003 PHARM REV CODE 250: Performed by: INTERNAL MEDICINE

## 2025-06-10 PROCEDURE — 83735 ASSAY OF MAGNESIUM: CPT | Performed by: STUDENT IN AN ORGANIZED HEALTH CARE EDUCATION/TRAINING PROGRAM

## 2025-06-10 PROCEDURE — 87493 C DIFF AMPLIFIED PROBE: CPT | Performed by: PHYSICIAN ASSISTANT

## 2025-06-10 PROCEDURE — 82272 OCCULT BLD FECES 1-3 TESTS: CPT | Performed by: INTERNAL MEDICINE

## 2025-06-10 RX ADMIN — FLUTICASONE FUROATE AND VILANTEROL TRIFENATATE 1 PUFF: 100; 25 POWDER RESPIRATORY (INHALATION) at 08:06

## 2025-06-10 RX ADMIN — PHYTONADIONE 5 MG: 10 INJECTION, EMULSION INTRAMUSCULAR; INTRAVENOUS; SUBCUTANEOUS at 09:06

## 2025-06-10 RX ADMIN — METOPROLOL SUCCINATE 200 MG: 100 TABLET, EXTENDED RELEASE ORAL at 09:06

## 2025-06-10 RX ADMIN — AMLODIPINE BESYLATE AND BENAZEPRIL HYDROCHLORIDE 2 CAPSULE: 5; 20 CAPSULE ORAL at 10:06

## 2025-06-10 RX ADMIN — NICOTINE 1 PATCH: 14 PATCH, EXTENDED RELEASE TRANSDERMAL at 09:06

## 2025-06-10 RX ADMIN — ASPIRIN 81 MG: 81 TABLET, COATED ORAL at 09:06

## 2025-06-10 RX ADMIN — DOXYCYCLINE HYCLATE 100 MG: 100 TABLET, FILM COATED ORAL at 09:06

## 2025-06-10 RX ADMIN — INSULIN ASPART 5 UNITS: 100 INJECTION, SOLUTION INTRAVENOUS; SUBCUTANEOUS at 11:06

## 2025-06-10 RX ADMIN — ATORVASTATIN CALCIUM 80 MG: 40 TABLET, FILM COATED ORAL at 09:06

## 2025-06-10 RX ADMIN — SPIRONOLACTONE 25 MG: 25 TABLET, FILM COATED ORAL at 09:06

## 2025-06-10 RX ADMIN — INSULIN ASPART 4 UNITS: 100 INJECTION, SOLUTION INTRAVENOUS; SUBCUTANEOUS at 03:06

## 2025-06-10 NOTE — CARE UPDATE
Unit [facility] Care Support Interaction      I have reviewed the chart of Karen Mohan who is hospitalized for Supratherapeutic INR. The patient is currently located in the following unit: CSU      I have assisted the primary physician in management of the following:      C. difficile - Diarrhea present on admission and tested within 48      Ayala Boston PA-C  Unit Based [facility]

## 2025-06-10 NOTE — ASSESSMENT & PLAN NOTE
Last A1c reviewed-   Lab Results   Component Value Date    HGBA1C 8.3 (H) 06/09/2025     Most recent fingerstick glucose reviewed-   Recent Labs   Lab 06/09/25  1731 06/10/25  0746   POCTGLUCOSE 323* 286*     Current correctional scale  Low  Repeat A1c  Consider adding SGLT2i  Maintain anti-hyperglycemic dose as follows-   Antihyperglycemics (From admission, onward)      Start     Stop Route Frequency Ordered    06/09/25 0607  insulin aspart U-100 pen 0-5 Units         -- SubQ Before meals & nightly PRN 06/09/25 0513          Hold Oral hypoglycemics while patient is in the hospital.

## 2025-06-10 NOTE — ASSESSMENT & PLAN NOTE
INR >10  S/p vitamin K dose  Repeat INR and trend    6/10  Continue to hold warfarin.  INR this morning still elevated greater than 10, we will give an additional dose of vitamin K IV 5 mg, discussed with pharmacy.

## 2025-06-10 NOTE — ASSESSMENT & PLAN NOTE
Minimal volume hemoptysis in setting of elevated INR  CT chest reviewed with chronic significant emphysema  Treat for COPD exacerbation: received antibiotics in the ED, continue doxycycline  -inhalers added, breathing treatments prn, prednisone 40mg x5 days  -referral to pulmonology outpatient  Smoking cessation    6/10  Different stories, hard to discern if it was true hemoptysis or sneezing up blood.  CT does report findings of COPD.  No evidence of exacerbation.  Continue nebs p.r.n..  We will continue doxycycline for now.  Though white cell count elevated, procalcitonin was normal.

## 2025-06-10 NOTE — PROGRESS NOTES
Patient admitted to CSU. Patient arrived to floor from ED no evidence of distress; patient AAO x4 at this time. Patient placed on tele. Vital signs obtained. Patient voices no complaints at this time. Plan of care initiated with patient. Bed in lowest position, locked, SR up x2, call bell in reach. Will continue to monitor patient.  Report given to oncoming nurse   06/09/25 3215   Vital Signs   Temp 98.3 °F (36.8 °C)   Temp Source Oral   Pulse 89   Heart Rate Source Monitor   Resp 18   SpO2 (!) 93 %   Device (Oxygen Therapy) room air   /85   MAP (mmHg) 102   BP Location Left arm   BP Method Automatic   Patient Position Sitting

## 2025-06-10 NOTE — SUBJECTIVE & OBJECTIVE
Interval History:  Today, her  reports that she did have an episode of hemoptysis on presentation.  Since hospitalization has no complaints or issues.  Denies any chest pain or shortness of breath.  INR this morning still elevated greater than 10.  Denies any visual impairment    Review of Systems   Unable to perform ROS: Other     Objective:     Vital Signs (Most Recent):  Temp: 98.3 °F (36.8 °C) (06/10/25 0645)  Pulse: 81 (06/10/25 0813)  Resp: 16 (06/10/25 0813)  BP: (!) 144/83 (06/10/25 0645)  SpO2: (!) 94 % (06/10/25 0813) Vital Signs (24h Range):  Temp:  [97.8 °F (36.6 °C)-98.8 °F (37.1 °C)] 98.3 °F (36.8 °C)  Pulse:  [79-96] 81  Resp:  [16-20] 16  SpO2:  [90 %-96 %] 94 %  BP: (133-174)/(68-86) 144/83     Weight: 63.4 kg (139 lb 12.4 oz)  Body mass index is 21.89 kg/m².    Intake/Output Summary (Last 24 hours) at 6/10/2025 0903  Last data filed at 6/10/2025 0600  Gross per 24 hour   Intake 720 ml   Output --   Net 720 ml         Physical Exam  Constitutional:       General: She is not in acute distress.  HENT:      Head: Normocephalic.      Right Ear: External ear normal.      Left Ear: External ear normal.      Nose: Nose normal.   Eyes:      Comments: Blood shot left eye   Cardiovascular:      Rate and Rhythm: Normal rate.      Heart sounds: Normal heart sounds.   Pulmonary:      Breath sounds: Normal breath sounds.   Abdominal:      Palpations: Abdomen is soft.      Tenderness: There is no abdominal tenderness.   Skin:     General: Skin is warm.   Neurological:      General: No focal deficit present.      Mental Status: She is alert and oriented to person, place, and time.   Psychiatric:         Mood and Affect: Mood normal.         Thought Content: Thought content normal.               Significant Labs: All pertinent labs within the past 24 hours have been reviewed.    Significant Imaging: I have reviewed all pertinent imaging results/findings within the past 24 hours.

## 2025-06-10 NOTE — PLAN OF CARE
Problem: Adult Inpatient Plan of Care  Goal: Plan of Care Review  Outcome: Progressing  Goal: Patient-Specific Goal (Individualized)  Outcome: Progressing  Goal: Absence of Hospital-Acquired Illness or Injury  Outcome: Progressing  Goal: Optimal Comfort and Wellbeing  Outcome: Progressing  Goal: Readiness for Transition of Care  Outcome: Progressing     Problem: COPD (Chronic Obstructive Pulmonary Disease)  Goal: Optimal Chronic Illness Coping  Outcome: Progressing  Goal: Optimal Level of Functional [place]  Outcome: Progressing  Goal: Absence of Infection Signs and Symptoms  Outcome: Progressing  Goal: Improved Oral Intake  Outcome: Progressing  Goal: Effective Oxygenation and Ventilation  Outcome: Progressing     Problem: Diabetes Comorbidity  Goal: Blood Glucose Level Within Targeted Range  Outcome: Progressing   Patient remains free from fall or injury. Questions encouraged and answered. Plan of care discussed with patient and/or family. Will continue to monitor, will continue with plan of care.

## 2025-06-10 NOTE — ASSESSMENT & PLAN NOTE
Patient's blood pressure range in the last 24 hours was: BP  Min: 121/69  Max: 174/86.The patient's inpatient anti-hypertensive regimen is listed below:  Current Antihypertensives  amlodipine-benazepril 5-20 mg per capsule 2 capsule, Daily, Oral  spironolactone tablet 25 mg, Daily, Oral  metoprolol succinate (TOPROL-XL) 24 hr tablet 200 mg, Daily, Oral    Plan  - BP is controlled, no changes needed to their regimen

## 2025-06-10 NOTE — PROGRESS NOTES
Anand Shepard - Cardiology [facility] Medicine  Progress Note    Patient Name: Karen Mohan  MRN: [id number]  Patient Class: OP- Observation   Admission Date: 6/9/2025  Length of Stay: 0 days  Attending Physician: Keven Lim MD  Primary Care Provider: Ghazala Valencia MD        Subjective     Principal Problem:Supratherapeutic INR        HPI:  Karen Mohan is a 69-year-old female with a PMHx of CAD s/p MI with LV thrombus on coumadin, HLD, HTN, DM2, tobacco use, emphysema presenting today for small volume hemoptysis. She coughed earlier and had dark red blood mixed with sputum. This happened 2 additional times. She also noticed that her left eye looked bloodshot earlier today prior to coughing. No preceding productive cough, fever/chills, sick contacts. She is on Coumadin, last INR check was 1 month ago. She is a current smoker, denies chest pain or shortness of breath. No leg swelling. No recent fevers or chills. Denies epistaxis.     H/H elevated in the setting of chronic emphysema. INR supratherapeutic >10. No recent trauma, melena, or hematuria.    Overview/Hospital Course:  Patient presented with possible hemoptysis or sneezing up blood associated with a blood shot left eye.  She was found to have supratherapeutic INR greater than 10.  Admitted and received vitamin K. had leukocytosis, however normal procalcitonin.  No overt source of infection, Started on doxycycline with improvement in white cell count    Interval History:  Today, her  reports that she did have an episode of hemoptysis on presentation.  Since hospitalization has no complaints or issues.  Denies any chest pain or shortness of breath.  INR this morning still elevated greater than 10.  Denies any visual impairment    Review of Systems   Unable to perform ROS: Other     Objective:     Vital Signs (Most Recent):  Temp: 98.3 °F (36.8 °C) (06/10/25 0645)  Pulse: 81 (06/10/25 0813)  Resp: 16 (06/10/25 0813)  BP: (!) 144/83 (06/10/25  0645)  SpO2: (!) 94 % (06/10/25 0813) Vital Signs (24h Range):  Temp:  [97.8 °F (36.6 °C)-98.8 °F (37.1 °C)] 98.3 °F (36.8 °C)  Pulse:  [79-96] 81  Resp:  [16-20] 16  SpO2:  [90 %-96 %] 94 %  BP: (133-174)/(68-86) 144/83     Weight: 63.4 kg (139 lb 12.4 oz)  Body mass index is 21.89 kg/m².    Intake/Output Summary (Last 24 hours) at 6/10/2025 0903  Last data filed at 6/10/2025 0600  Gross per 24 hour   Intake 720 ml   Output --   Net 720 ml         Physical Exam  Constitutional:       General: She is not in acute distress.  HENT:      Head: Normocephalic.      Right Ear: External ear normal.      Left Ear: External ear normal.      Nose: Nose normal.   Eyes:      Comments: Blood shot left eye   Cardiovascular:      Rate and Rhythm: Normal rate.      Heart sounds: Normal heart sounds.   Pulmonary:      Breath sounds: Normal breath sounds.   Abdominal:      Palpations: Abdomen is soft.      Tenderness: There is no abdominal tenderness.   Skin:     General: Skin is warm.   Neurological:      General: No focal deficit present.      Mental Status: She is alert and oriented to person, place, and time.   Psychiatric:         Mood and Affect: Mood normal.         Thought Content: Thought content normal.               Significant Labs: All pertinent labs within the past 24 hours have been reviewed.    Significant Imaging: I have reviewed all pertinent imaging results/findings within the past 24 hours.      Assessment & Plan  Supratherapeutic INR  INR >10  S/p vitamin K dose  Repeat INR and trend    6/10  Continue to hold warfarin.  INR this morning still elevated greater than 10, we will give an additional dose of vitamin K IV 5 mg, discussed with pharmacy.  HTN (hypertension)  Patient's blood pressure range in the last 24 hours was: BP  Min: 121/69  Max: 174/86.The patient's inpatient anti-hypertensive regimen is listed below:  Current Antihypertensives  amlodipine-benazepril 5-20 mg per capsule 2 capsule, Daily,  Oral  spironolactone tablet 25 mg, Daily, Oral  metoprolol succinate (TOPROL-XL) 24 hr tablet 200 mg, Daily, Oral    Plan  - BP is controlled, no changes needed to their regimen  Hyperlipidemia LDL goal <70  Continue lipitor 40  Goal <70 in diabetic, smoker, CAD    Diabetes mellitus, type 2  Last A1c reviewed-   Lab Results   Component Value Date    HGBA1C 8.3 (H) 06/09/2025     Most recent fingerstick glucose reviewed-   Recent Labs   Lab 06/09/25  1731 06/10/25  0746   POCTGLUCOSE 323* 286*     Current correctional scale  Low  Repeat A1c  Consider adding SGLT2i  Maintain anti-hyperglycemic dose as follows-   Antihyperglycemics (From admission, onward)      Start     Stop Route Frequency Ordered    06/09/25 0607  insulin aspart U-100 pen 0-5 Units         -- SubQ Before meals & nightly PRN 06/09/25 0513          Hold Oral hypoglycemics while patient is in the hospital.  Tobacco abuse  Smoking cessation  PRN nicotine patch    LV (left ventricular) mural thrombus  Hold coumadin today, re-evaluate restarting at lower dose based on INR levels  Continue coumadin for LV thrombus when safe to do so.    History of MI (myocardial infarction)  On aspirin and atorvastatin  GDMT: spionolactone, metoprolol, benazepril  Repeat echo outpatient given last available in 2016  Jardiance tried but had mild nausea/vomiting, try re-initiating    Hemoptysis  Minimal volume hemoptysis in setting of elevated INR  CT chest reviewed with chronic significant emphysema  Treat for COPD exacerbation: received antibiotics in the ED, continue doxycycline  -inhalers added, breathing treatments prn, prednisone 40mg x5 days  -referral to pulmonology outpatient  Smoking cessation    6/10  Different stories, hard to discern if it was true hemoptysis or sneezing up blood.  CT does report findings of COPD.  No evidence of exacerbation.  Continue nebs p.r.n..  We will continue doxycycline for now.  Though white cell count elevated, procalcitonin was  normal.    VTE Risk Mitigation (From admission, onward)           Ordered     Reason for No Pharmacological VTE Prophylaxis  Once        Question:  Reasons:  Answer:  Already adequately anticoagulated on oral Anticoagulants    06/09/25 0513     IP VTE HIGH RISK PATIENT  Once         06/09/25 0513     Place sequential compression device  Until discontinued         06/09/25 0513                    Discharge Planning   JONH: 6/13/2025     Code Status: Full Code   Medical Readiness for Discharge Date:                            Keven Lim MD  Department of Hospital Medicine   [facility] - Cardiology Stepdown

## 2025-06-10 NOTE — PLAN OF CARE
Initial Discharge Planning Assessment:  Patient admitted on: 6/9/25     Chart reviewed, Care plan discussed with treatment team,  attending Dr. Lim     PCP updated in Epic: Dr. Valencia  Pharmacy, updated in Epic: [facility]      DME at home: WC, RW & SC      Current dispo: Home with family       Transportation: Family can provides     Power of  or Living Will:       Anticipated DC needs from CM perspective:  None     Discharge Plan A and Plan B have been determined by review of patient's clinical status, future medical and therapeutic needs, and coverage/benefits for post-acute care in coordination with multidisciplinary team members.    Anand Shepard - Cardiology Stepdown  Initial Discharge Assessment       Primary Care Provider: Ghazala Valencia MD    Admission Diagnosis: Tobacco abuse [Z72.0]  Primary hypertension [I10]  Screening for cardiovascular condition [Z13.6]  SOB (shortness of breath) [R06.02]  Tachycardia [R00.0]  Hyperlipidemia LDL goal <70 [E78.5]  LV (left ventricular) mural thrombus [I51.3]  History of MI (myocardial infarction) [I25.2]  Hemoptysis [R04.2]  Supratherapeutic INR [R79.1]  Subconjunctival hemorrhage, left [H11.32]  Type 2 diabetes mellitus without complication, with long-term current use of insulin [E11.9, Z79.4]    Admission Date: 6/9/2025  Expected Discharge Date: 6/13/2025    Transition of Care Barriers: (P) None    Payor: [employer] MANAGED MEDICARE / Plan: [employer] SNP HMO PPO SPECIAL NEEDS / Product Type: Medicare Advantage /     Extended Emergency Contact Information  Primary Emergency Contact: Vega Mitchell  Address: [address] [place] [id number] [facility]  Home Phone: [phone]  Relation: Relative    Discharge Plan A: (P) Home with family  Discharge Plan B: (P) Home with family    No Pharmacies Listed    Initial Assessment (most recent)       Adult Discharge Assessment - 06/10/25 0916          Discharge Assessment    Assessment Type  Discharge Planning Assessment     Confirmed/corrected address, phone number and insurance Yes     Confirmed Demographics Correct on Facesheet     Source of Information patient;health record     People in Home spouse (P)      Do you expect to return to your current living situation? Yes     Do you have help at home or someone to help you manage your care at home? Yes     Prior to hospitilization cognitive status: Alert/Oriented     Current cognitive status: Alert/Oriented     Walking or Climbing Stairs Difficulty no     Dressing/Bathing Difficulty no     Equipment Currently Used at Home walker, rolling;wheelchair;shower chair (P)      Readmission within 30 days? No     Patient currently being followed by outpatient case management? No     Do you currently have service(s) that help you manage your care at home? No     Do you take prescription medications? Yes     Do you have prescription coverage? Yes     Do you have any problems affording any of your prescribed medications? No     Is the patient taking medications as prescribed? yes     How do you get to doctors appointments? car, drives self;family or friend will provide (P)      Are you on dialysis? No (P)      Do you take coumadin? No (P)      Discharge Plan A Home with family (P)      Discharge Plan B Home with family (P)      DME Needed Upon Discharge  none (P)      Discharge Plan discussed with: Patient (P)      Transition of Care Barriers None (P)         Physical Activity    On average, how many days per week do you engage in moderate to strenuous exercise (like a brisk walk)? 0 days (P)      On average, how many minutes do you engage in exercise at this level? 0 min (P)         Financial Resource Strain    How hard is it for you to pay for the very basics like food, housing, medical care, and heating? Not hard at all (P)         Housing Stability    In the last 12 months, was there a time when you were not able to pay the mortgage or rent on time? No (P)       At any time in the past 12 months, were you homeless or living in a shelter (including now)? No (P)         Transportation Needs    In the past 12 months, has lack of transportation kept you from medical appointments or from getting medications? No (P)      In the past 12 months, has lack of transportation kept you from meetings, work, or from getting things needed for daily living? No (P)         Food Insecurity    Within the past 12 months, you worried that your food would run out before you got the money to buy more. Never true (P)      Within the past 12 months, the food you bought just didn't last and you didn't have money to get more. Never true (P)         Stress    Do you feel stress - tense, restless, nervous, or anxious, or unable to sleep at night because your mind is troubled all the time - these days? Not at all (P)         Social Isolation    How often do you feel lonely or isolated from those around you?  Never (P)         Alcohol Use    Q1: How often do you have a drink containing alcohol? Never (P)      Q2: How many drinks containing alcohol do you have on a typical day when you are drinking? Patient does not drink (P)      Q3: How often do you have six or more drinks on one occasion? Never (P)         Utilities    In the past 12 months has the electric, gas, oil, or water company threatened to shut off services in your home? No (P)         Health Literacy    How often do you need to have someone help you when you read instructions, pamphlets, or other written material from your doctor or pharmacy? Never (P)

## 2025-06-10 NOTE — HOSPITAL COURSE
"  PMHx of CAD s/p RCA PCI x 2 (2000 and [facility] [place] [facility] [place] thrombus, embolic CVA in 2013, DM, HTN, HLD, and CVA/TIA       Patient presented with possible hemoptysis or sneezing up blood associated with a blood shot left eye, poor historian and varying stories.  She was found to have supratherapeutic INR greater than 10.  Admitted and received oral and IV vitamin K with correction of INR. She has leukocytosis, however normal procalcitonin.  She was initially started on doxycycline on presentation given the "hemoptysis" and emphysema noted on CT and Did receive a 1 time dose of prednisone. She was Found to be cdiff antigen and toxin positive, with dark stool, hb stable, spoke with GI, they recommend treating cdiff for now. Cdiff may account for leukocytosis.  "

## 2025-06-11 PROBLEM — A04.72 CLOSTRIDIUM DIFFICILE ENTERITIS: Status: ACTIVE | Noted: 2025-06-11

## 2025-06-11 LAB
ABSOLUTE EOSINOPHIL (OHS): 0.1 K/UL
ABSOLUTE EOSINOPHIL (OHS): 0.13 K/UL
ABSOLUTE MONOCYTE (OHS): 1.05 K/UL (ref 0.3–1)
ABSOLUTE MONOCYTE (OHS): 1.05 K/UL (ref 0.3–1)
ABSOLUTE NEUTROPHIL COUNT (OHS): 11.67 K/UL (ref 1.8–7.7)
ABSOLUTE NEUTROPHIL COUNT (OHS): 12.3 K/UL (ref 1.8–7.7)
ALBUMIN SERPL BCP-MCNC: 3.1 G/DL (ref 3.5–5.2)
ALP SERPL-CCNC: 105 UNIT/L (ref 40–150)
ALT SERPL W/O P-5'-P-CCNC: 18 UNIT/L (ref 10–44)
ANION GAP (OHS): 10 MMOL/L (ref 8–16)
APTT PPP: 26.5 SECONDS (ref 21–32)
APTT PPP: 29.1 SECONDS (ref 21–32)
APTT PPP: 63.8 SECONDS (ref 21–32)
AST SERPL-CCNC: 22 UNIT/L (ref 11–45)
BASOPHILS # BLD AUTO: 0.13 K/UL
BASOPHILS # BLD AUTO: 0.14 K/UL
BASOPHILS NFR BLD AUTO: 0.8 %
BASOPHILS NFR BLD AUTO: 0.8 %
BILIRUB SERPL-MCNC: 2.3 MG/DL (ref 0.1–1)
BUN SERPL-MCNC: 47 MG/DL (ref 8–23)
C DIFF GDH STL QL: POSITIVE
C DIFF TOX A+B STL QL IA: NEGATIVE
C DIFF TOX GENS STL QL NAA+PROBE: POSITIVE
CALCIUM SERPL-MCNC: 8.6 MG/DL (ref 8.7–10.5)
CHLORIDE SERPL-SCNC: 104 MMOL/L (ref 95–110)
CO2 SERPL-SCNC: 21 MMOL/L (ref 23–29)
CREAT SERPL-MCNC: 1.1 MG/DL (ref 0.5–1.4)
ERYTHROCYTE [DISTWIDTH] IN BLOOD BY AUTOMATED COUNT: 14.4 % (ref 11.5–14.5)
ERYTHROCYTE [DISTWIDTH] IN BLOOD BY AUTOMATED COUNT: 14.6 % (ref 11.5–14.5)
GFR SERPLBLD CREATININE-BSD FMLA CKD-EPI: 55 ML/MIN/1.73/M2
GLUCOSE SERPL-MCNC: 241 MG/DL (ref 70–110)
HCT VFR BLD AUTO: 42.6 % (ref 37–48.5)
HCT VFR BLD AUTO: 45.3 % (ref 37–48.5)
HGB BLD-MCNC: 14.5 GM/DL (ref 12–16)
HGB BLD-MCNC: 15 GM/DL (ref 12–16)
IMM GRANULOCYTES # BLD AUTO: 0.16 K/UL (ref 0–0.04)
IMM GRANULOCYTES # BLD AUTO: 0.26 K/UL (ref 0–0.04)
IMM GRANULOCYTES NFR BLD AUTO: 1 % (ref 0–0.5)
IMM GRANULOCYTES NFR BLD AUTO: 1.5 % (ref 0–0.5)
INR PPP: 1.5 (ref 0.8–1.2)
LYMPHOCYTES # BLD AUTO: 3.15 K/UL (ref 1–4.8)
LYMPHOCYTES # BLD AUTO: 3.37 K/UL (ref 1–4.8)
MAGNESIUM SERPL-MCNC: 1.8 MG/DL (ref 1.6–2.6)
MCH RBC QN AUTO: 31.1 PG (ref 27–31)
MCH RBC QN AUTO: 31.5 PG (ref 27–31)
MCHC RBC AUTO-ENTMCNC: 33.1 G/DL (ref 32–36)
MCHC RBC AUTO-ENTMCNC: 34 G/DL (ref 32–36)
MCV RBC AUTO: 92 FL (ref 82–98)
MCV RBC AUTO: 94 FL (ref 82–98)
NUCLEATED RBC (/100WBC) (OHS): 0 /100 WBC
NUCLEATED RBC (/100WBC) (OHS): 0 /100 WBC
OB PNL STL: POSITIVE
PHOSPHATE SERPL-MCNC: 2.4 MG/DL (ref 2.7–4.5)
PLATELET # BLD AUTO: 190 K/UL (ref 150–450)
PLATELET # BLD AUTO: 205 K/UL (ref 150–450)
PMV BLD AUTO: 11.4 FL (ref 9.2–12.9)
PMV BLD AUTO: 11.9 FL (ref 9.2–12.9)
POCT GLUCOSE: 304 MG/DL (ref 70–110)
POCT GLUCOSE: 341 MG/DL (ref 70–110)
POCT GLUCOSE: 346 MG/DL (ref 70–110)
POTASSIUM SERPL-SCNC: 3.4 MMOL/L (ref 3.5–5.1)
PROT SERPL-MCNC: 6.3 GM/DL (ref 6–8.4)
PROTHROMBIN TIME: 15.6 SECONDS (ref 9–12.5)
RBC # BLD AUTO: 4.61 M/UL (ref 4–5.4)
RBC # BLD AUTO: 4.83 M/UL (ref 4–5.4)
RELATIVE EOSINOPHIL (OHS): 0.6 %
RELATIVE EOSINOPHIL (OHS): 0.8 %
RELATIVE LYMPHOCYTE (OHS): 18.5 % (ref 18–48)
RELATIVE LYMPHOCYTE (OHS): 20.4 % (ref 18–48)
RELATIVE MONOCYTE (OHS): 6.2 % (ref 4–15)
RELATIVE MONOCYTE (OHS): 6.4 % (ref 4–15)
RELATIVE NEUTROPHIL (OHS): 70.8 % (ref 38–73)
RELATIVE NEUTROPHIL (OHS): 72.2 % (ref 38–73)
SODIUM SERPL-SCNC: 135 MMOL/L (ref 136–145)
WBC # BLD AUTO: 16.49 K/UL (ref 3.9–12.7)
WBC # BLD AUTO: 17.02 K/UL (ref 3.9–12.7)

## 2025-06-11 PROCEDURE — 99900035 HC TECH TIME PER 15 MIN (STAT)

## 2025-06-11 PROCEDURE — S4991 NICOTINE PATCH NONLEGEND: HCPCS | Performed by: STUDENT IN AN ORGANIZED HEALTH CARE EDUCATION/TRAINING PROGRAM

## 2025-06-11 PROCEDURE — 94640 AIRWAY INHALATION TREATMENT: CPT

## 2025-06-11 PROCEDURE — 85025 COMPLETE CBC W/AUTO DIFF WBC: CPT | Performed by: STUDENT IN AN ORGANIZED HEALTH CARE EDUCATION/TRAINING PROGRAM

## 2025-06-11 PROCEDURE — 80053 COMPREHEN METABOLIC PANEL: CPT | Performed by: STUDENT IN AN ORGANIZED HEALTH CARE EDUCATION/TRAINING PROGRAM

## 2025-06-11 PROCEDURE — 94761 N-INVAS EAR/PLS OXIMETRY MLT: CPT

## 2025-06-11 PROCEDURE — 25000003 PHARM REV CODE 250: Performed by: STUDENT IN AN ORGANIZED HEALTH CARE EDUCATION/TRAINING PROGRAM

## 2025-06-11 PROCEDURE — 36415 COLL VENOUS BLD VENIPUNCTURE: CPT | Performed by: STUDENT IN AN ORGANIZED HEALTH CARE EDUCATION/TRAINING PROGRAM

## 2025-06-11 PROCEDURE — 85730 THROMBOPLASTIN TIME PARTIAL: CPT | Mod: 91 | Performed by: INTERNAL MEDICINE

## 2025-06-11 PROCEDURE — 85610 PROTHROMBIN TIME: CPT | Performed by: STUDENT IN AN ORGANIZED HEALTH CARE EDUCATION/TRAINING PROGRAM

## 2025-06-11 PROCEDURE — G0378 HOSPITAL OBSERVATION PER HR: HCPCS

## 2025-06-11 PROCEDURE — 25000003 PHARM REV CODE 250: Performed by: INTERNAL MEDICINE

## 2025-06-11 PROCEDURE — 25000242 PHARM REV CODE 250 ALT 637 W/ HCPCS: Performed by: STUDENT IN AN ORGANIZED HEALTH CARE EDUCATION/TRAINING PROGRAM

## 2025-06-11 PROCEDURE — 96361 HYDRATE IV INFUSION ADD-ON: CPT

## 2025-06-11 PROCEDURE — 36415 COLL VENOUS BLD VENIPUNCTURE: CPT | Performed by: INTERNAL MEDICINE

## 2025-06-11 PROCEDURE — 96366 THER/PROPH/DIAG IV INF ADDON: CPT

## 2025-06-11 PROCEDURE — 85025 COMPLETE CBC W/AUTO DIFF WBC: CPT | Mod: 91 | Performed by: INTERNAL MEDICINE

## 2025-06-11 PROCEDURE — 63600175 PHARM REV CODE 636 W HCPCS: Performed by: INTERNAL MEDICINE

## 2025-06-11 PROCEDURE — 96367 TX/PROPH/DG ADDL SEQ IV INF: CPT

## 2025-06-11 PROCEDURE — 84100 ASSAY OF PHOSPHORUS: CPT | Performed by: STUDENT IN AN ORGANIZED HEALTH CARE EDUCATION/TRAINING PROGRAM

## 2025-06-11 PROCEDURE — 83735 ASSAY OF MAGNESIUM: CPT | Performed by: STUDENT IN AN ORGANIZED HEALTH CARE EDUCATION/TRAINING PROGRAM

## 2025-06-11 RX ORDER — SODIUM CHLORIDE 9 MG/ML
INJECTION, SOLUTION INTRAVENOUS CONTINUOUS
Status: DISCONTINUED | OUTPATIENT
Start: 2025-06-11 | End: 2025-06-11

## 2025-06-11 RX ORDER — WARFARIN SODIUM 5 MG/1
5 TABLET ORAL DAILY
Status: DISCONTINUED | OUTPATIENT
Start: 2025-06-11 | End: 2025-06-12

## 2025-06-11 RX ORDER — HEPARIN SODIUM,PORCINE/D5W 25000/250
0-40 INTRAVENOUS SOLUTION INTRAVENOUS CONTINUOUS
Status: DISCONTINUED | OUTPATIENT
Start: 2025-06-11 | End: 2025-06-13

## 2025-06-11 RX ADMIN — INSULIN ASPART 2 UNITS: 100 INJECTION, SOLUTION INTRAVENOUS; SUBCUTANEOUS at 08:06

## 2025-06-11 RX ADMIN — WARFARIN SODIUM 5 MG: 5 TABLET ORAL at 05:06

## 2025-06-11 RX ADMIN — INSULIN ASPART 4 UNITS: 100 INJECTION, SOLUTION INTRAVENOUS; SUBCUTANEOUS at 04:06

## 2025-06-11 RX ADMIN — ASPIRIN 81 MG: 81 TABLET, COATED ORAL at 08:06

## 2025-06-11 RX ADMIN — HEPARIN SODIUM AND DEXTROSE 18 UNITS/KG/HR: 10000; 5 INJECTION INTRAVENOUS at 03:06

## 2025-06-11 RX ADMIN — ATORVASTATIN CALCIUM 80 MG: 40 TABLET, FILM COATED ORAL at 08:06

## 2025-06-11 RX ADMIN — FLUTICASONE FUROATE AND VILANTEROL TRIFENATATE 1 PUFF: 100; 25 POWDER RESPIRATORY (INHALATION) at 08:06

## 2025-06-11 RX ADMIN — SODIUM CHLORIDE: 9 INJECTION, SOLUTION INTRAVENOUS at 08:06

## 2025-06-11 RX ADMIN — NICOTINE 1 PATCH: 14 PATCH, EXTENDED RELEASE TRANSDERMAL at 08:06

## 2025-06-11 RX ADMIN — INSULIN ASPART 4 UNITS: 100 INJECTION, SOLUTION INTRAVENOUS; SUBCUTANEOUS at 11:06

## 2025-06-11 RX ADMIN — VANCOMYCIN HYDROCHLORIDE 125 MG: KIT at 05:06

## 2025-06-11 NOTE — ASSESSMENT & PLAN NOTE
Dark loose stools.  C diff antigen and toxin positive.  Hemoglobin is stable, spoke with GI they recommended treated C diff for now.  Leukocytosis also noted, however normal procalcitonin.  Isolation.  Start oral vancomycin, monitor cbc

## 2025-06-11 NOTE — PROGRESS NOTES
"Anand Shepard - Cardiology [facility] Medicine  Progress Note    Patient Name: Karen Mohan  MRN: [id number]  Patient Class: OP- Observation   Admission Date: 6/9/2025  Length of Stay: 0 days  Attending Physician: Keven Lim MD  Primary Care Provider: Ghazala Valencia MD        Subjective     Principal Problem:Supratherapeutic INR        HPI:  Karen Mohan is a 69-year-old female with a PMHx of CAD s/p MI with LV thrombus on coumadin, HLD, HTN, DM2, tobacco use, emphysema presenting today for small volume hemoptysis. She coughed earlier and had dark red blood mixed with sputum. This happened 2 additional times. She also noticed that her left eye looked bloodshot earlier today prior to coughing. No preceding productive cough, fever/chills, sick contacts. She is on Coumadin, last INR check was 1 month ago. She is a current smoker, denies chest pain or shortness of breath. No leg swelling. No recent fevers or chills. Denies epistaxis.     H/H elevated in the setting of chronic emphysema. INR supratherapeutic >10. No recent trauma, melena, or hematuria.    Overview/Hospital Course:  Patient presented with possible hemoptysis or sneezing up blood associated with a blood shot left eye, poor historian and varying stories.  She was found to have supratherapeutic INR greater than 10.  Admitted and received oral and IV vitamin K with correction of INR. She has leukocytosis, however normal procalcitonin.  She was initially started on doxycycline on presentation given the "hemoptysis" and emphysema noted on CT and Did receive a 1 time dose of prednisone. She was Found to be cdiff antigen and toxin positive, with dark stool, hb stable, spoke with GI, they recommend treating cdiff for now. Cdiff may account for leukocytosis.    Interval History:  Loose stools this morning, subsequent hypotension and weakness on standing.  Received 500 cc bolus of normal saline with improvement of blood pressure and symptoms.  Repeat CBC " stable.  Started treatment for C diff, reached out to GI they recommended just treating C diff for now    Review of Systems   Unable to perform ROS: Other     Objective:     Vital Signs (Most Recent):  Temp: 97.3 °F (36.3 °C) (06/11/25 1144)  Pulse: 83 (06/11/25 1152)  Resp: 18 (06/11/25 1144)  BP: (!) 106/58 (06/11/25 1144)  SpO2: 95 % (06/11/25 1144) Vital Signs (24h Range):  Temp:  [97.1 °F (36.2 °C)-98.6 °F (37 °C)] 97.3 °F (36.3 °C)  Pulse:  [70-90] 83  Resp:  [18] 18  SpO2:  [91 %-98 %] 95 %  BP: ()/(45-73) 106/58     Weight: 63.4 kg (139 lb 12.4 oz)  Body mass index is 21.89 kg/m².    Intake/Output Summary (Last 24 hours) at 6/11/2025 1331  Last data filed at 6/11/2025 0600  Gross per 24 hour   Intake 960 ml   Output --   Net 960 ml           Physical Exam  Constitutional:       General: She is not in acute distress.  HENT:      Head: Normocephalic.      Right Ear: External ear normal.      Left Ear: External ear normal.      Nose: Nose normal.   Eyes:      Comments: Blood shot left eye improved   Cardiovascular:      Rate and Rhythm: Normal rate.      Heart sounds: Normal heart sounds.   Pulmonary:      Breath sounds: Normal breath sounds.   Abdominal:      Palpations: Abdomen is soft.      Tenderness: There is no abdominal tenderness.   Skin:     General: Skin is warm.   Neurological:      General: No focal deficit present.      Mental Status: She is alert and oriented to person, place, and time.   Psychiatric:         Mood and Affect: Mood normal.         Thought Content: Thought content normal.              Significant Labs: All pertinent labs within the past 24 hours have been reviewed.      Assessment & Plan  Supratherapeutic INR  INR >10  S/p vitamin K dose  Repeat INR and trend    6/10  Continue to hold warfarin.  INR this morning still elevated greater than 10, we will give an additional dose of vitamin K IV 5 mg, discussed with pharmacy.  6/11  INR corrected.  1.5 this morning.  We will resume  warfarin at a lower dose 5 mg daily, heparin bridge in the interim.  Pharm consult  HTN (hypertension)  Patient's blood pressure range in the last 24 hours was: BP  Min: 80/45  Max: 118/66.The patient's inpatient anti-hypertensive regimen is listed below:  Current Antihypertensives  amlodipine-benazepril 5-20 mg per capsule 2 capsule, Daily, Oral  spironolactone tablet 25 mg, Daily, Oral  metoprolol succinate (TOPROL-XL) 24 hr tablet 200 mg, Daily, Oral    Plan  - BP is controlled, no changes needed to their regimen  Hyperlipidemia LDL goal <70  Continue lipitor 40  Goal <70 in diabetic, smoker, CAD    Diabetes mellitus, type 2  Last A1c reviewed-   Lab Results   Component Value Date    HGBA1C 8.3 (H) 06/09/2025     Most recent fingerstick glucose reviewed-   Recent Labs   Lab 06/10/25  1558 06/10/25  2103 06/11/25  1142   POCTGLUCOSE 332* 245* 304*     Current correctional scale  Low  Repeat A1c  Consider adding SGLT2i  Maintain anti-hyperglycemic dose as follows-   Antihyperglycemics (From admission, onward)      Start     Stop Route Frequency Ordered    06/09/25 0607  insulin aspart U-100 pen 0-5 Units         -- SubQ Before meals & nightly PRN 06/09/25 0513          Hold Oral hypoglycemics while patient is in the hospital.  Tobacco abuse  Smoking cessation  PRN nicotine patch    LV (left ventricular) mural thrombus  Hold coumadin today, re-evaluate restarting at lower dose based on INR levels  Continue coumadin for LV thrombus when safe to do so.    History of MI (myocardial infarction)  On aspirin and atorvastatin  GDMT: spionolactone, metoprolol, benazepril  Repeat echo outpatient given last available in 2016  Jardiance tried but had mild nausea/vomiting, try re-initiating    Hemoptysis  Minimal volume hemoptysis in setting of elevated INR  CT chest reviewed with chronic significant emphysema  Treat for COPD exacerbation: received antibiotics in the ED, continue doxycycline  -inhalers added, breathing  treatments prn, prednisone 40mg x5 days  -referral to pulmonology outpatient  Smoking cessation    6/11  Different stories, hard to discern if it was true hemoptysis or sneezing up blood.  CT does report findings of COPD.  No evidence of exacerbation.  Continue nebs p.r.n.. D/c Steroids and doxycycline. O/p pulm referral  Clostridium difficile enteritis  Dark loose stools.  C diff antigen and toxin positive.  Hemoglobin is stable, spoke with GI they recommended treated C diff for now.  Leukocytosis also noted, however normal procalcitonin.  Isolation.  Start oral vancomycin, monitor cbc    VTE Risk Mitigation (From admission, onward)           Ordered     warfarin (COUMADIN) tablet 5 mg  Daily         06/11/25 0819     heparin 25,000 units in dextrose 5% (100 units/ml) IV bolus from bag HIGH INTENSITY nomogram - [facility]  As needed (PRN)        Question:  Heparin Infusion Adjustment (DO NOT MODIFY ANSWER)  Answer:  [email or web address]\Images\Pharmacy\HeparinInfusions\heparin HIGH INTENSITY nomogram for [facility] [id number]    06/11/25 0826     heparin 25,000 units in dextrose 5% (100 units/ml) IV bolus from bag HIGH INTENSITY nomogram - [facility]  As needed (PRN)        Question:  Heparin Infusion Adjustment (DO NOT MODIFY ANSWER)  Answer:  \[email or web address]\Images\Pharmacy\HeparinInfusions\heparin HIGH INTENSITY nomogram for [facility] [id number]    06/11/25 0826     heparin 25,000 units in dextrose 5% (100 units/ml) IV bolus from bag HIGH INTENSITY nomogram - [facility]  Once        Question:  Heparin Infusion Adjustment (DO NOT MODIFY ANSWER)  Answer:  \[email or web address]\Images\Pharmacy\HeparinInfusions\heparin HIGH INTENSITY nomogram for [facility] [id number]    06/11/25 0826     heparin 25,000 units in dextrose 5% 250 mL (100 units/mL) infusion HIGH INTENSITY nomogram - [facility]  Continuous        Question:  Begin at (units/kg/hr)  Answer:  18 06/11/25 0826     Reason for No Pharmacological VTE Prophylaxis  Once        Question:  Reasons:  Answer:   Already adequately anticoagulated on oral Anticoagulants    06/09/25 0513     IP VTE HIGH RISK PATIENT  Once         06/09/25 0513     Place sequential compression device  Until discontinued         06/09/25 0513                    Discharge Planning   JONH: 6/13/2025     Code Status: Full Code   Medical Readiness for Discharge Date:   Discharge Plan A: Home with family                        Keven Lim MD  Department of Hospital Medicine   [facility] - Cardiology Stepdown

## 2025-06-11 NOTE — PLAN OF CARE
06/11/25 1559   Discharge Reassessment   Assessment Type Discharge Planning Reassessment   Did the patient's condition or plan change since previous assessment? Yes   Discharge Plan discussed with: Patient   Discharge Plan A Home with family   Discharge Plan B Home Health;Home with family   DME Needed Upon Discharge  none   Transition of Care Barriers None   Why the patient remains in the hospital Requires continued medical care   Post-Acute Status   Discharge Delays None known at this time

## 2025-06-11 NOTE — ASSESSMENT & PLAN NOTE
Patient's blood pressure range in the last 24 hours was: BP  Min: 80/45  Max: 118/66.The patient's inpatient anti-hypertensive regimen is listed below:  Current Antihypertensives  amlodipine-benazepril 5-20 mg per capsule 2 capsule, Daily, Oral  spironolactone tablet 25 mg, Daily, Oral  metoprolol succinate (TOPROL-XL) 24 hr tablet 200 mg, Daily, Oral    Plan  - BP is controlled, no changes needed to their regimen

## 2025-06-11 NOTE — ASSESSMENT & PLAN NOTE
Minimal volume hemoptysis in setting of elevated INR  CT chest reviewed with chronic significant emphysema  Treat for COPD exacerbation: received antibiotics in the ED, continue doxycycline  -inhalers added, breathing treatments prn, prednisone 40mg x5 days  -referral to pulmonology outpatient  Smoking cessation    6/11  Different stories, hard to discern if it was true hemoptysis or sneezing up blood.  CT does report findings of COPD.  No evidence of exacerbation.  Continue nebs p.r.n.. D/c Steroids and doxycycline. O/p pulm referral

## 2025-06-11 NOTE — ASSESSMENT & PLAN NOTE
Last A1c reviewed-   Lab Results   Component Value Date    HGBA1C 8.3 (H) 06/09/2025     Most recent fingerstick glucose reviewed-   Recent Labs   Lab 06/10/25  1558 06/10/25  2103 06/11/25  1142   POCTGLUCOSE 332* 245* 304*     Current correctional scale  Low  Repeat A1c  Consider adding SGLT2i  Maintain anti-hyperglycemic dose as follows-   Antihyperglycemics (From admission, onward)      Start     Stop Route Frequency Ordered    06/09/25 0607  insulin aspart U-100 pen 0-5 Units         -- SubQ Before meals & nightly PRN 06/09/25 0513          Hold Oral hypoglycemics while patient is in the hospital.

## 2025-06-11 NOTE — SUBJECTIVE & OBJECTIVE
Interval History:  Loose stools this morning, subsequent hypotension and weakness on standing.  Received 500 cc bolus of normal saline with improvement of blood pressure and symptoms.  Repeat CBC stable.  Started treatment for C diff, reached out to GI they recommended just treating C diff for now    Review of Systems   Unable to perform ROS: Other     Objective:     Vital Signs (Most Recent):  Temp: 97.3 °F (36.3 °C) (06/11/25 1144)  Pulse: 83 (06/11/25 1152)  Resp: 18 (06/11/25 1144)  BP: (!) 106/58 (06/11/25 1144)  SpO2: 95 % (06/11/25 1144) Vital Signs (24h Range):  Temp:  [97.1 °F (36.2 °C)-98.6 °F (37 °C)] 97.3 °F (36.3 °C)  Pulse:  [70-90] 83  Resp:  [18] 18  SpO2:  [91 %-98 %] 95 %  BP: ()/(45-73) 106/58     Weight: 63.4 kg (139 lb 12.4 oz)  Body mass index is 21.89 kg/m².    Intake/Output Summary (Last 24 hours) at 6/11/2025 1331  Last data filed at 6/11/2025 0600  Gross per 24 hour   Intake 960 ml   Output --   Net 960 ml           Physical Exam  Constitutional:       General: She is not in acute distress.  HENT:      Head: Normocephalic.      Right Ear: External ear normal.      Left Ear: External ear normal.      Nose: Nose normal.   Eyes:      Comments: Blood shot left eye improved   Cardiovascular:      Rate and Rhythm: Normal rate.      Heart sounds: Normal heart sounds.   Pulmonary:      Breath sounds: Normal breath sounds.   Abdominal:      Palpations: Abdomen is soft.      Tenderness: There is no abdominal tenderness.   Skin:     General: Skin is warm.   Neurological:      General: No focal deficit present.      Mental Status: She is alert and oriented to person, place, and time.   Psychiatric:         Mood and Affect: Mood normal.         Thought Content: Thought content normal.              Significant Labs: All pertinent labs within the past 24 hours have been reviewed.

## 2025-06-11 NOTE — PLAN OF CARE
Problem: Adult Inpatient Plan of Care  Goal: Plan of Care Review  Outcome: Progressing  Goal: Patient-Specific Goal (Individualized)  Outcome: Progressing  Goal: Absence of Hospital-Acquired Illness or Injury  Outcome: Progressing  Goal: Optimal Comfort and Wellbeing  Outcome: Progressing  Goal: Readiness for Transition of Care  Outcome: Progressing     Problem: COPD (Chronic Obstructive Pulmonary Disease)  Goal: Optimal Chronic Illness Coping  Outcome: Progressing  Goal: Optimal Level of Functional [place]  Outcome: Progressing  Goal: Absence of Infection Signs and Symptoms  Outcome: Progressing  Goal: Improved Oral Intake  Outcome: Progressing  Goal: Effective Oxygenation and Ventilation  Outcome: Progressing     Problem: Diabetes Comorbidity  Goal: Blood Glucose Level Within Targeted Range  Outcome: Progressing     Problem: Fall Injury Risk  Goal: Absence of Fall and Fall-Related Injury  Outcome: Progressing     Problem: Infection  Goal: Absence of Infection Signs and Symptoms  Outcome: Progressing       Patient remains free from fall or injury. Questions encouraged and answered. Plan of care discussed with patient and/or family. Will continue to monitor, will continue with plan of care.

## 2025-06-11 NOTE — CONSULTS
Hospital Medicine Pharmacy Consult Note    Pharmacy to review dose of warfarin  Karen Mohan is a 69 y.o. female on warfarin therapy for LV thrombus. PharmD has been consulted for warfarin dosing.    Current order: warfarin 5 mg daily  Home dose: warfarin 10 mg daily  Coumadin clinic enrollment: follows with [facility]  INR goal: 2-3    Lab Results   Component Value Date    INR 1.5 (H) 06/11/2025    INR >10.0 (HH) 06/10/2025    INR >10.0 (HH) 06/09/2025     Significant drug interactions: vitamin K 5 mg PO x1 on 06/09, vitamin K 5 mg IV x1 on 06/10  Diet: consistent carbohydrate     Recommendation(s):   Agree with warfarin 5 mg PO daily for now  Will take time to overcome vitamin K resistance  Continue bridge until INR >= 2.0  Scr 1.1 increased from 0.7 yesterday, if kidney function stabilizes may change heparin to enoxaparin 1 mg/kg (60 mg) SUBQ BID for bridging  Unclear why INR > 10 at this time - no new drug interactions in fill history, previous dosing appears to be 7.5 mg daily  Left message with Dr. Valencia's office to discuss last warfarin dose, though per discussion with patient it was 10 mg daily  Daily INR  Pharmacy will continue to follow and monitor warfarin    Thank you for the consult,  Yo Lewis  [place] [phone]    **Note: Consults are reviewed Monday-Friday 7:00am-3:30pm. The above recommendations are only suggested. The recommendations should be considered in conjunction with all patient factors.**

## 2025-06-11 NOTE — PLAN OF CARE
Problem: Infection  Goal: Absence of Infection Signs and Symptoms  Outcome: Progressing       Problem: Fall Injury Risk  Goal: Absence of Fall and Fall-Related Injury  Outcome: Progressing       Problem: Diabetes Comorbidity  Goal: Blood Glucose Level Within Targeted Range  Outcome: Progressing       Problem: COPD (Chronic Obstructive Pulmonary Disease)  Goal: Optimal Chronic Illness Coping  Outcome: Progressing

## 2025-06-11 NOTE — ASSESSMENT & PLAN NOTE
INR >10  S/p vitamin K dose  Repeat INR and trend    6/10  Continue to hold warfarin.  INR this morning still elevated greater than 10, we will give an additional dose of vitamin K IV 5 mg, discussed with pharmacy.  6/11  INR corrected.  1.5 this morning.  We will resume warfarin at a lower dose 5 mg daily, heparin bridge in the interim.  Pharm consult

## 2025-06-11 NOTE — CARE UPDATE
Holding antihypertensive medication for now, we will continue with aspirin given no fall in hemoglobin

## 2025-06-12 LAB
ABSOLUTE EOSINOPHIL (OHS): 0.16 K/UL
ABSOLUTE MONOCYTE (OHS): 0.97 K/UL (ref 0.3–1)
ABSOLUTE NEUTROPHIL COUNT (OHS): 9.83 K/UL (ref 1.8–7.7)
ALBUMIN SERPL BCP-MCNC: 3 G/DL (ref 3.5–5.2)
ALP SERPL-CCNC: 123 UNIT/L (ref 40–150)
ALT SERPL W/O P-5'-P-CCNC: 16 UNIT/L (ref 10–44)
ANION GAP (OHS): 9 MMOL/L (ref 8–16)
APTT PPP: 107.9 SECONDS (ref 21–32)
APTT PPP: 53 SECONDS (ref 21–32)
APTT PPP: 84 SECONDS (ref 21–32)
AST SERPL-CCNC: 16 UNIT/L (ref 11–45)
BASOPHILS # BLD AUTO: 0.14 K/UL
BASOPHILS NFR BLD AUTO: 1 %
BILIRUB SERPL-MCNC: 1.3 MG/DL (ref 0.1–1)
BUN SERPL-MCNC: 46 MG/DL (ref 8–23)
CALCIUM SERPL-MCNC: 8.5 MG/DL (ref 8.7–10.5)
CHLORIDE SERPL-SCNC: 106 MMOL/L (ref 95–110)
CO2 SERPL-SCNC: 21 MMOL/L (ref 23–29)
CREAT SERPL-MCNC: 1 MG/DL (ref 0.5–1.4)
ERYTHROCYTE [DISTWIDTH] IN BLOOD BY AUTOMATED COUNT: 14.4 % (ref 11.5–14.5)
GFR SERPLBLD CREATININE-BSD FMLA CKD-EPI: >60 ML/MIN/1.73/M2
GLUCOSE SERPL-MCNC: 334 MG/DL (ref 70–110)
GLUCOSE SERPL-MCNC: 341 MG/DL (ref 70–110)
GLUCOSE SERPL-MCNC: 366 MG/DL (ref 70–110)
HCT VFR BLD AUTO: 41.6 % (ref 37–48.5)
HGB BLD-MCNC: 13.9 GM/DL (ref 12–16)
IMM GRANULOCYTES # BLD AUTO: 0.15 K/UL (ref 0–0.04)
IMM GRANULOCYTES NFR BLD AUTO: 1.1 % (ref 0–0.5)
INR PPP: 1.9 (ref 0.8–1.2)
LYMPHOCYTES # BLD AUTO: 2.99 K/UL (ref 1–4.8)
MAGNESIUM SERPL-MCNC: 1.9 MG/DL (ref 1.6–2.6)
MCH RBC QN AUTO: 31 PG (ref 27–31)
MCHC RBC AUTO-ENTMCNC: 33.4 G/DL (ref 32–36)
MCV RBC AUTO: 93 FL (ref 82–98)
NUCLEATED RBC (/100WBC) (OHS): 0 /100 WBC
PHOSPHATE SERPL-MCNC: 3.4 MG/DL (ref 2.7–4.5)
PLATELET # BLD AUTO: 201 K/UL (ref 150–450)
PMV BLD AUTO: 11.7 FL (ref 9.2–12.9)
POCT GLUCOSE: 317 MG/DL (ref 70–110)
POCT GLUCOSE: 327 MG/DL (ref 70–110)
POCT GLUCOSE: 337 MG/DL (ref 70–110)
POCT GLUCOSE: 349 MG/DL (ref 70–110)
POCT GLUCOSE: 397 MG/DL (ref 70–110)
POTASSIUM SERPL-SCNC: 3.5 MMOL/L (ref 3.5–5.1)
PROT SERPL-MCNC: 6.1 GM/DL (ref 6–8.4)
PROTHROMBIN TIME: 19.5 SECONDS (ref 9–12.5)
RBC # BLD AUTO: 4.49 M/UL (ref 4–5.4)
RELATIVE EOSINOPHIL (OHS): 1.1 %
RELATIVE LYMPHOCYTE (OHS): 21 % (ref 18–48)
RELATIVE MONOCYTE (OHS): 6.8 % (ref 4–15)
RELATIVE NEUTROPHIL (OHS): 69 % (ref 38–73)
SODIUM SERPL-SCNC: 136 MMOL/L (ref 136–145)
WBC # BLD AUTO: 14.24 K/UL (ref 3.9–12.7)

## 2025-06-12 PROCEDURE — 84100 ASSAY OF PHOSPHORUS: CPT | Performed by: STUDENT IN AN ORGANIZED HEALTH CARE EDUCATION/TRAINING PROGRAM

## 2025-06-12 PROCEDURE — 25000003 PHARM REV CODE 250: Performed by: INTERNAL MEDICINE

## 2025-06-12 PROCEDURE — 36415 COLL VENOUS BLD VENIPUNCTURE: CPT | Performed by: STUDENT IN AN ORGANIZED HEALTH CARE EDUCATION/TRAINING PROGRAM

## 2025-06-12 PROCEDURE — 25000003 PHARM REV CODE 250: Performed by: STUDENT IN AN ORGANIZED HEALTH CARE EDUCATION/TRAINING PROGRAM

## 2025-06-12 PROCEDURE — 85025 COMPLETE CBC W/AUTO DIFF WBC: CPT | Performed by: STUDENT IN AN ORGANIZED HEALTH CARE EDUCATION/TRAINING PROGRAM

## 2025-06-12 PROCEDURE — 85730 THROMBOPLASTIN TIME PARTIAL: CPT

## 2025-06-12 PROCEDURE — S4991 NICOTINE PATCH NONLEGEND: HCPCS | Performed by: STUDENT IN AN ORGANIZED HEALTH CARE EDUCATION/TRAINING PROGRAM

## 2025-06-12 PROCEDURE — 27000207 HC ISOLATION

## 2025-06-12 PROCEDURE — 96366 THER/PROPH/DIAG IV INF ADDON: CPT

## 2025-06-12 PROCEDURE — 85610 PROTHROMBIN TIME: CPT | Performed by: STUDENT IN AN ORGANIZED HEALTH CARE EDUCATION/TRAINING PROGRAM

## 2025-06-12 PROCEDURE — 25000003 PHARM REV CODE 250

## 2025-06-12 PROCEDURE — 63600175 PHARM REV CODE 636 W HCPCS: Performed by: INTERNAL MEDICINE

## 2025-06-12 PROCEDURE — 11000001 HC ACUTE MED/SURG PRIVATE ROOM

## 2025-06-12 PROCEDURE — 36415 COLL VENOUS BLD VENIPUNCTURE: CPT | Performed by: INTERNAL MEDICINE

## 2025-06-12 PROCEDURE — 80053 COMPREHEN METABOLIC PANEL: CPT | Performed by: STUDENT IN AN ORGANIZED HEALTH CARE EDUCATION/TRAINING PROGRAM

## 2025-06-12 PROCEDURE — 83735 ASSAY OF MAGNESIUM: CPT | Performed by: STUDENT IN AN ORGANIZED HEALTH CARE EDUCATION/TRAINING PROGRAM

## 2025-06-12 PROCEDURE — 94761 N-INVAS EAR/PLS OXIMETRY MLT: CPT

## 2025-06-12 PROCEDURE — 85730 THROMBOPLASTIN TIME PARTIAL: CPT | Mod: 91 | Performed by: INTERNAL MEDICINE

## 2025-06-12 PROCEDURE — 94640 AIRWAY INHALATION TREATMENT: CPT | Mod: XB

## 2025-06-12 PROCEDURE — 36415 COLL VENOUS BLD VENIPUNCTURE: CPT

## 2025-06-12 RX ORDER — WARFARIN 2.5 MG/1
2.5 TABLET ORAL DAILY
Status: DISCONTINUED | OUTPATIENT
Start: 2025-06-12 | End: 2025-06-13

## 2025-06-12 RX ADMIN — VANCOMYCIN HYDROCHLORIDE 125 MG: KIT at 06:06

## 2025-06-12 RX ADMIN — VANCOMYCIN HYDROCHLORIDE 125 MG: KIT at 12:06

## 2025-06-12 RX ADMIN — INSULIN ASPART 2 UNITS: 100 INJECTION, SOLUTION INTRAVENOUS; SUBCUTANEOUS at 08:06

## 2025-06-12 RX ADMIN — ATORVASTATIN CALCIUM 80 MG: 40 TABLET, FILM COATED ORAL at 09:06

## 2025-06-12 RX ADMIN — FLUTICASONE FUROATE AND VILANTEROL TRIFENATATE 1 PUFF: 100; 25 POWDER RESPIRATORY (INHALATION) at 08:06

## 2025-06-12 RX ADMIN — WARFARIN SODIUM 2.5 MG: 2.5 TABLET ORAL at 05:06

## 2025-06-12 RX ADMIN — HEPARIN SODIUM AND DEXTROSE 11 UNITS/KG/HR: 10000; 5 INJECTION INTRAVENOUS at 05:06

## 2025-06-12 RX ADMIN — INSULIN ASPART 5 UNITS: 100 INJECTION, SOLUTION INTRAVENOUS; SUBCUTANEOUS at 05:06

## 2025-06-12 RX ADMIN — ASPIRIN 81 MG: 81 TABLET, COATED ORAL at 09:06

## 2025-06-12 RX ADMIN — VANCOMYCIN HYDROCHLORIDE 125 MG: KIT at 11:06

## 2025-06-12 RX ADMIN — INSULIN ASPART 4 UNITS: 100 INJECTION, SOLUTION INTRAVENOUS; SUBCUTANEOUS at 11:06

## 2025-06-12 RX ADMIN — NICOTINE 1 PATCH: 14 PATCH, EXTENDED RELEASE TRANSDERMAL at 09:06

## 2025-06-12 NOTE — PLAN OF CARE
Inpatient Upgrade Note    Karen Mohan has warranted treatment spanning two or more midnights of hospital level care for the management of Supratherapeutic INR and Hypotension. She continues to require daily labs, further testing/imaging, monitoring of vital signs, medication adjustments, and Heparin Drip. Her condition is also complicated by the following comorbidities: CAD s/p MI with LV thrombus on coumadin, HLD, HTN, DM2, tobacco use, emphysema .

## 2025-06-12 NOTE — PLAN OF CARE
Patient plan of care reviewed with patient and family. Patient PTT is therpeutic as of 8pm. She has no siomara acquired injuries and no c/o pain. Patient with no difficulty breathing and remains fairly independent.

## 2025-06-12 NOTE — PROGRESS NOTES
Hospital Medicine Pharmacy Consult Note    Pharmacy to review dose of warfarin  Karen Mohan is a 69 y.o. female on warfarin therapy for LV thrombus. PharmD has been consulted for warfarin dosing.     Current order: warfarin 5 mg daily  Home dose: warfarin 10 mg daily  Coumadin clinic enrollment: follows with [facility]  INR goal: 2-3    Lab Results   Component Value Date    INR 1.9 (H) 06/12/2025    INR 1.5 (H) 06/11/2025    INR >10.0 (HH) 06/10/2025    PROTIME 19.5 (H) 06/12/2025    PROTIME 15.6 (H) 06/11/2025    PROTIME >100.0 (H) 06/10/2025     Significant drug interactions: vitamin K 5 mg PO x1 on 06/09, vitamin K 5 mg IV x1 on 06/10   Diet: consistent carbohydrate     Recommendation(s):   INR increased following 1x dose of warfarin 5 mg in addition to receiving a total of vitamin K 10 mg on 06/10 - 06/10  Decrease warfarin to 2.5 mg  Ok to continue bridge until INR >= 2.0  Left message for Dr. Valencia's office yesterday, no call back. Attempted to call back today and unable to reach anybody.  Daily INR  Pharmacy will continue to follow and monitor warfarin    Thank you for the consult,  Yo Lewis  [place] [phone]    **Note: Consults are reviewed Monday-Friday 7:00am-3:30pm. The above recommendations are only suggested. The recommendations should be considered in conjunction with all patient factors.**

## 2025-06-13 LAB
ABSOLUTE EOSINOPHIL (OHS): 0.17 K/UL
ABSOLUTE MONOCYTE (OHS): 1.02 K/UL (ref 0.3–1)
ABSOLUTE NEUTROPHIL COUNT (OHS): 10.72 K/UL (ref 1.8–7.7)
APTT PPP: 57.2 SECONDS (ref 21–32)
APTT PPP: 60.1 SECONDS (ref 21–32)
BASOPHILS # BLD AUTO: 0.14 K/UL
BASOPHILS NFR BLD AUTO: 1 %
ERYTHROCYTE [DISTWIDTH] IN BLOOD BY AUTOMATED COUNT: 14.5 % (ref 11.5–14.5)
HCT VFR BLD AUTO: 40.7 % (ref 37–48.5)
HGB BLD-MCNC: 13.6 GM/DL (ref 12–16)
IMM GRANULOCYTES # BLD AUTO: 0.17 K/UL (ref 0–0.04)
IMM GRANULOCYTES NFR BLD AUTO: 1.2 % (ref 0–0.5)
INR PPP: 3.4 (ref 0.8–1.2)
LYMPHOCYTES # BLD AUTO: 2.35 K/UL (ref 1–4.8)
MCH RBC QN AUTO: 31.1 PG (ref 27–31)
MCHC RBC AUTO-ENTMCNC: 33.4 G/DL (ref 32–36)
MCV RBC AUTO: 93 FL (ref 82–98)
NUCLEATED RBC (/100WBC) (OHS): 0 /100 WBC
PLATELET # BLD AUTO: 209 K/UL (ref 150–450)
PMV BLD AUTO: 11.6 FL (ref 9.2–12.9)
POCT GLUCOSE: 289 MG/DL (ref 70–110)
POCT GLUCOSE: 312 MG/DL (ref 70–110)
POCT GLUCOSE: 319 MG/DL (ref 70–110)
PROTHROMBIN TIME: 33.9 SECONDS (ref 9–12.5)
RBC # BLD AUTO: 4.37 M/UL (ref 4–5.4)
RELATIVE EOSINOPHIL (OHS): 1.2 %
RELATIVE LYMPHOCYTE (OHS): 16.1 % (ref 18–48)
RELATIVE MONOCYTE (OHS): 7 % (ref 4–15)
RELATIVE NEUTROPHIL (OHS): 73.5 % (ref 38–73)
WBC # BLD AUTO: 14.57 K/UL (ref 3.9–12.7)

## 2025-06-13 PROCEDURE — 36415 COLL VENOUS BLD VENIPUNCTURE: CPT | Performed by: STUDENT IN AN ORGANIZED HEALTH CARE EDUCATION/TRAINING PROGRAM

## 2025-06-13 PROCEDURE — 63600175 PHARM REV CODE 636 W HCPCS

## 2025-06-13 PROCEDURE — 93005 ELECTROCARDIOGRAM TRACING: CPT

## 2025-06-13 PROCEDURE — 11000001 HC ACUTE MED/SURG PRIVATE ROOM

## 2025-06-13 PROCEDURE — 93010 ELECTROCARDIOGRAM REPORT: CPT | Mod: ,,, | Performed by: INTERNAL MEDICINE

## 2025-06-13 PROCEDURE — 94761 N-INVAS EAR/PLS OXIMETRY MLT: CPT

## 2025-06-13 PROCEDURE — 85610 PROTHROMBIN TIME: CPT | Performed by: STUDENT IN AN ORGANIZED HEALTH CARE EDUCATION/TRAINING PROGRAM

## 2025-06-13 PROCEDURE — 25000003 PHARM REV CODE 250: Performed by: STUDENT IN AN ORGANIZED HEALTH CARE EDUCATION/TRAINING PROGRAM

## 2025-06-13 PROCEDURE — S4991 NICOTINE PATCH NONLEGEND: HCPCS | Performed by: STUDENT IN AN ORGANIZED HEALTH CARE EDUCATION/TRAINING PROGRAM

## 2025-06-13 PROCEDURE — 94640 AIRWAY INHALATION TREATMENT: CPT

## 2025-06-13 PROCEDURE — 85730 THROMBOPLASTIN TIME PARTIAL: CPT

## 2025-06-13 PROCEDURE — 27000207 HC ISOLATION

## 2025-06-13 PROCEDURE — 25000003 PHARM REV CODE 250: Performed by: INTERNAL MEDICINE

## 2025-06-13 PROCEDURE — 85025 COMPLETE CBC W/AUTO DIFF WBC: CPT | Performed by: INTERNAL MEDICINE

## 2025-06-13 RX ORDER — INSULIN GLARGINE 100 [IU]/ML
7 INJECTION, SOLUTION SUBCUTANEOUS DAILY
Status: DISCONTINUED | OUTPATIENT
Start: 2025-06-13 | End: 2025-06-15

## 2025-06-13 RX ADMIN — FLUTICASONE FUROATE AND VILANTEROL TRIFENATATE 1 PUFF: 100; 25 POWDER RESPIRATORY (INHALATION) at 01:06

## 2025-06-13 RX ADMIN — INSULIN GLARGINE 7 UNITS: 100 INJECTION, SOLUTION SUBCUTANEOUS at 12:06

## 2025-06-13 RX ADMIN — VANCOMYCIN HYDROCHLORIDE 125 MG: KIT at 10:06

## 2025-06-13 RX ADMIN — INSULIN ASPART 4 UNITS: 100 INJECTION, SOLUTION INTRAVENOUS; SUBCUTANEOUS at 05:06

## 2025-06-13 RX ADMIN — VANCOMYCIN HYDROCHLORIDE 125 MG: KIT at 12:06

## 2025-06-13 RX ADMIN — VANCOMYCIN HYDROCHLORIDE 125 MG: KIT at 05:06

## 2025-06-13 RX ADMIN — ASPIRIN 81 MG: 81 TABLET, COATED ORAL at 09:06

## 2025-06-13 RX ADMIN — NICOTINE 1 PATCH: 14 PATCH, EXTENDED RELEASE TRANSDERMAL at 09:06

## 2025-06-13 RX ADMIN — VANCOMYCIN HYDROCHLORIDE 125 MG: KIT at 06:06

## 2025-06-13 RX ADMIN — ATORVASTATIN CALCIUM 80 MG: 40 TABLET, FILM COATED ORAL at 09:06

## 2025-06-13 RX ADMIN — INSULIN ASPART 4 UNITS: 100 INJECTION, SOLUTION INTRAVENOUS; SUBCUTANEOUS at 12:06

## 2025-06-13 RX ADMIN — INSULIN ASPART 1 UNITS: 100 INJECTION, SOLUTION INTRAVENOUS; SUBCUTANEOUS at 10:06

## 2025-06-13 RX ADMIN — INSULIN ASPART 3 UNITS: 100 INJECTION, SOLUTION INTRAVENOUS; SUBCUTANEOUS at 09:06

## 2025-06-13 NOTE — PROGRESS NOTES
"[facility] - Cardiology [facility] Medicine  Progress Note    Patient Name: Karen Mohan  MRN: [id number]  Patient Class: IP- Inpatient   Admission Date: 6/9/2025  Length of Stay: 1 days  Attending Physician: Diane Araujo MD  Primary Care Provider: Ghazala Valencia MD        Subjective     Principal Problem:Supratherapeutic INR        HPI:  Karen Mohan is a 69-year-old female with a PMHx of CAD s/p MI with LV thrombus on coumadin, HLD, HTN, DM2, tobacco use, emphysema presenting today for small volume hemoptysis. She coughed earlier and had dark red blood mixed with sputum. This happened 2 additional times. She also noticed that her left eye looked bloodshot earlier today prior to coughing. No preceding productive cough, fever/chills, sick contacts. She is on Coumadin, last INR check was 1 month ago. She is a current smoker, denies chest pain or shortness of breath. No leg swelling. No recent fevers or chills. Denies epistaxis.     H/H elevated in the setting of chronic emphysema. INR supratherapeutic >10. No recent trauma, melena, or hematuria.    Overview/Hospital Course:  Patient presented with possible hemoptysis or sneezing up blood associated with a blood shot left eye, poor historian and varying stories.  She was found to have supratherapeutic INR greater than 10.  Admitted and received oral and IV vitamin K with correction of INR. She has leukocytosis, however normal procalcitonin.  She was initially started on doxycycline on presentation given the "hemoptysis" and emphysema noted on CT and Did receive a 1 time dose of prednisone. She was Found to be cdiff antigen and toxin positive, with dark stool, hb stable, spoke with GI, they recommend treating cdiff for now. Cdiff may account for leukocytosis.    Interval History:      INR still subtherapeutic. Still mild hempomysis. Left knee pain since fall prior to admission.     Review of Systems   Unable to perform ROS: Other     Objective: "     Vital Signs (Most Recent):  Temp: 98.2 °F (36.8 °C) (06/13/25 0001)  Pulse: 73 (06/13/25 0001)  Resp: 18 (06/12/25 1937)  BP: (!) 144/62 (06/13/25 0001)  SpO2: 96 % (06/13/25 0001) Vital Signs (24h Range):  Temp:  [96.8 °F (36 °C)-98.6 °F (37 °C)] 98.2 °F (36.8 °C)  Pulse:  [] 73  Resp:  [16-18] 18  SpO2:  [95 %-100 %] 96 %  BP: (106-144)/(55-72) 144/62     Weight: 63.4 kg (139 lb 12.4 oz)  Body mass index is 21.89 kg/m².    Intake/Output Summary (Last 24 hours) at 6/13/2025 0016  Last data filed at 6/12/2025 1733  Gross per 24 hour   Intake 1069.55 ml   Output --   Net 1069.55 ml           Physical Exam  Constitutional:       General: She is not in acute distress.  HENT:      Head: Normocephalic.      Right Ear: External ear normal.      Left Ear: External ear normal.      Nose: Nose normal.   Eyes:      Comments: Blood shot left eye improved   Cardiovascular:      Rate and Rhythm: Normal rate.      Heart sounds: Normal heart sounds.   Pulmonary:      Breath sounds: Normal breath sounds.   Abdominal:      Palpations: Abdomen is soft.      Tenderness: There is no abdominal tenderness.   Skin:     General: Skin is warm.   Neurological:      General: No focal deficit present.      Mental Status: She is alert and oriented to person, place, and time.   Psychiatric:         Mood and Affect: Mood normal.         Thought Content: Thought content normal.              Significant Labs: All pertinent labs within the past 24 hours have been reviewed.  Physical Exam      Assessment & Plan  Supratherapeutic INR  INR >10  S/p vitamin K dose  Repeat INR and trend    6/10  Continue to hold warfarin.  INR this morning still elevated greater than 10, we will give an additional dose of vitamin K IV 5 mg, discussed with pharmacy.  6/11  INR corrected.  1.5 this morning.  We will resume warfarin at a lower dose 5 mg daily, heparin bridge in the interim.  Pharm consult  HTN (hypertension)  Patient's blood pressure range in the  last 24 hours was: BP  Min: 106/55  Max: 144/62.The patient's inpatient anti-hypertensive regimen is listed below:  Current Antihypertensives       Plan  - BP is controlled, no changes needed to their regimen  Hyperlipidemia LDL goal <70  Continue lipitor 40  Goal <70 in diabetic, smoker, CAD    Diabetes mellitus, type 2  Last A1c reviewed-   Lab Results   Component Value Date    HGBA1C 8.3 (H) 06/09/2025     Most recent fingerstick glucose reviewed-   Recent Labs   Lab 06/12/25  1108 06/12/25  1137 06/12/25  1626 06/12/25  1941   POCTGLUCOSE 317* 327* 397* 337*     Current correctional scale  Low  Repeat A1c  Consider adding SGLT2i  Maintain anti-hyperglycemic dose as follows-   Antihyperglycemics (From admission, onward)      Start     Stop Route Frequency Ordered    06/09/25 0607  insulin aspart U-100 pen 0-5 Units         -- SubQ Before meals & nightly PRN 06/09/25 0513          Hold Oral hypoglycemics while patient is in the hospital.  Tobacco abuse  Smoking cessation  PRN nicotine patch    LV (left ventricular) mural thrombus  Hold coumadin today, re-evaluate restarting at lower dose based on INR levels  Continue coumadin for LV thrombus when safe to do so.    History of MI (myocardial infarction)  On aspirin and atorvastatin  GDMT: spionolactone, metoprolol, benazepril  Repeat echo outpatient given last available in 2016  Jardiance tried but had mild nausea/vomiting, try re-initiating    Hemoptysis  Minimal volume hemoptysis in setting of elevated INR  CT chest reviewed with chronic significant emphysema  Treat for COPD exacerbation: received antibiotics in the ED, continue doxycycline  -inhalers added, breathing treatments prn, prednisone 40mg x5 days  -referral to pulmonology outpatient  Smoking cessation    6/11  Different stories, hard to discern if it was true hemoptysis or sneezing up blood.  CT does report findings of COPD.  No evidence of exacerbation.  Continue nebs p.r.n.. D/c Steroids and  doxycycline. O/p pulm referral  Clostridium difficile enteritis  Dark loose stools.  C diff antigen and toxin positive.  Hemoglobin is stable, spoke with GI they recommended treated C diff for now.  Leukocytosis also noted, however normal procalcitonin.  Isolation.  Start oral vancomycin, monitor cbc    VTE Risk Mitigation (From admission, onward)           Ordered     warfarin (COUMADIN) tablet 2.5 mg  Daily         06/12/25 1458     heparin 25,000 units in dextrose 5% (100 units/ml) IV bolus from bag HIGH INTENSITY nomogram - [facility]  As needed (PRN)        Question:  Heparin Infusion Adjustment (DO NOT MODIFY ANSWER)  Answer:  \\[email or web address]\epic\Images\Pharmacy\HeparinInfusions\heparin HIGH INTENSITY nomogram for [facility] [id number].pdf    06/11/25 0826     heparin 25,000 units in dextrose 5% (100 units/ml) IV bolus from bag HIGH INTENSITY nomogram - [facility]  As needed (PRN)        Question:  Heparin Infusion Adjustment (DO NOT MODIFY ANSWER)  Answer:  \\[email or web address]\epic\Images\Pharmacy\HeparinInfusions\heparin HIGH INTENSITY nomogram for [facility] [id number].pdf    06/11/25 0826     heparin 25,000 units in dextrose 5% (100 units/ml) IV bolus from bag HIGH INTENSITY nomogram - [facility]  Once        Question:  Heparin Infusion Adjustment (DO NOT MODIFY ANSWER)  Answer:  \\[email or web address]\epic\Images\Pharmacy\HeparinInfusions\heparin HIGH INTENSITY nomogram for [facility] [id number].pdf    06/11/25 0826     heparin 25,000 units in dextrose 5% 250 mL (100 units/mL) infusion HIGH INTENSITY nomogram - [facility]  Continuous        Question:  Begin at (units/kg/hr)  Answer:  18    06/11/25 0826     Reason for No Pharmacological VTE Prophylaxis  Once        Question:  Reasons:  Answer:  Already adequately anticoagulated on oral Anticoagulants    06/09/25 0513     IP VTE HIGH RISK PATIENT  Once         06/09/25 0513     Place sequential compression device  Until discontinued         06/09/25 0513                    Discharge Planning   JONH: 6/13/2025     Code Status: Full  Code   Medical Readiness for Discharge Date:   Discharge Plan A: Home with family, Home Health   Discharge Delays: None known at this time                    Diane Araujo MD  Department of Hospital Medicine   [facility] - Cardiology Stepdown

## 2025-06-13 NOTE — NURSING
Glucose before dinner, 319. ALDO Araujo MD notified. Inquired with MD about the possible need for an Endocrine consult. Per MD no, need for endocrine consult, give the sliding scale, see how she does with the Lantus and tomorrow she will begin mealtime insulin if needed. No additional orders given at this time, plan of care ongoing.

## 2025-06-13 NOTE — SUBJECTIVE & OBJECTIVE
Interval History:      INR still subtherapeutic. Still mild hempomysis. Left knee pain since fall prior to admission.     Review of Systems   Unable to perform ROS: Other     Objective:     Vital Signs (Most Recent):  Temp: 98.2 °F (36.8 °C) (06/13/25 0001)  Pulse: 73 (06/13/25 0001)  Resp: 18 (06/12/25 1937)  BP: (!) 144/62 (06/13/25 0001)  SpO2: 96 % (06/13/25 0001) Vital Signs (24h Range):  Temp:  [96.8 °F (36 °C)-98.6 °F (37 °C)] 98.2 °F (36.8 °C)  Pulse:  [] 73  Resp:  [16-18] 18  SpO2:  [95 %-100 %] 96 %  BP: (106-144)/(55-72) 144/62     Weight: 63.4 kg (139 lb 12.4 oz)  Body mass index is 21.89 kg/m².    Intake/Output Summary (Last 24 hours) at 6/13/2025 0016  Last data filed at 6/12/2025 1733  Gross per 24 hour   Intake 1069.55 ml   Output --   Net 1069.55 ml           Physical Exam  Constitutional:       General: She is not in acute distress.  HENT:      Head: Normocephalic.      Right Ear: External ear normal.      Left Ear: External ear normal.      Nose: Nose normal.   Eyes:      Comments: Blood shot left eye improved   Cardiovascular:      Rate and Rhythm: Normal rate.      Heart sounds: Normal heart sounds.   Pulmonary:      Breath sounds: Normal breath sounds.   Abdominal:      Palpations: Abdomen is soft.      Tenderness: There is no abdominal tenderness.   Skin:     General: Skin is warm.   Neurological:      General: No focal deficit present.      Mental Status: She is alert and oriented to person, place, and time.   Psychiatric:         Mood and Affect: Mood normal.         Thought Content: Thought content normal.              Significant Labs: All pertinent labs within the past 24 hours have been reviewed.  Physical Exam

## 2025-06-13 NOTE — PLAN OF CARE
Patient coagulation is improving. Patient  last PTT is therapeutic.  Patient with no s/s of bleeding tonight. Patient has no s/s of ANIKA induced injury. Patient safety is maintained with spouse at bs, sr up, bed in low position . Patient without s/s of pain tonight. Patient is ready for transition of care pending labs. Patient has no s/s of respiratory or ventilation issues. Patient blood sugar is elevated and controlling it with insulin

## 2025-06-13 NOTE — PROGRESS NOTES
Addendum: confirmed home dose with Daughter's rhonda Ren and is warfarin 10 mg daily, last INR check with them was 1.6 at the beginning of May while still on warfarin 10 mg daily. Of note per the fill history there are gaps so compliance is a concern.    Hospital Medicine Pharmacy Consult Note    Pharmacy to review dose of warfarin  Karen Mohan is a 69 y.o. female on warfarin therapy for LV thrombus. PharmD has been consulted for warfarin dosing.     Current order: warfarin 2.5 mg daily  Home dose: warfarin 10 mg daily  Coumadin clinic enrollment: [facility]  INR goal: 2-3    Lab Results   Component Value Date    INR 3.4 (H) 06/13/2025    INR 1.9 (H) 06/12/2025    INR 1.5 (H) 06/11/2025     Significant drug interactions: vitamin K 5 mg PO x1 on 06/09, vitamin K 5 mg IV x1 on 06/10   Diet: consistent carbohydrate    Recommendation(s):   Patient exhibiting warfarin sensitivity following vitamin K administration with large increase over previous 24 hours and INR now supratherapeutic  Hold warfarin tonight  Discontinue heparin bridge  Left message for Dr. Valencia's office 06/11 with no call back. Attempted to call back on 06/12 and unable to reach office. Called back 06/13 and prolonged hold period  Daily INR  Pharmacy will continue to follow and monitor warfarin    Thank you for the consult,  Yo Lewis  [place] [phone]    **Note: Consults are reviewed Monday-Friday 7:00am-3:30pm. The above recommendations are only suggested. The recommendations should be considered in conjunction with all patient factors.**

## 2025-06-13 NOTE — NURSING
Patient's lunch glucose 312. E. MD Van notified. Discussed with MD about ordering scheduled short acting Insulin. Per MD 7 units of Long acting insulin(Lantus) ordered. No additional orders given at this time, plan of care ongoing.

## 2025-06-13 NOTE — ASSESSMENT & PLAN NOTE
Patient's blood pressure range in the last 24 hours was: BP  Min: 106/55  Max: 144/62.The patient's inpatient anti-hypertensive regimen is listed below:  Current Antihypertensives       Plan  - BP is controlled, no changes needed to their regimen

## 2025-06-13 NOTE — NURSING
Patient is ready for discharge. Patient stable alert and oriented x4. IVs removed. No complaints of chest pain, sob, palpitations, dizziness, lightheadedness, headache, or n/v. Discussed discharge plan. Reviewed AVS; medications and side effects, appointments, and answered questions with patient and family. __ RX called into pharmacy and being picked up on the way to the car.

## 2025-06-13 NOTE — NURSING
Patient complaining of pain in her L anterior leg w/ A knot like substance noted to lateral knee. Patient stated she fell in the street prior to admission and can barely move it and is wondering if she damages something. ALDO Araujo MD notified. Xray of LLE ordered per MD. No additional orders given at this time, plan of care ongoing.

## 2025-06-13 NOTE — ASSESSMENT & PLAN NOTE
Last A1c reviewed-   Lab Results   Component Value Date    HGBA1C 8.3 (H) 06/09/2025     Most recent fingerstick glucose reviewed-   Recent Labs   Lab 06/12/25  1108 06/12/25  1137 06/12/25  1626 06/12/25  1941   POCTGLUCOSE 317* 327* 397* 337*     Current correctional scale  Low  Repeat A1c  Consider adding SGLT2i  Maintain anti-hyperglycemic dose as follows-   Antihyperglycemics (From admission, onward)      Start     Stop Route Frequency Ordered    06/09/25 0607  insulin aspart U-100 pen 0-5 Units         -- SubQ Before meals & nightly PRN 06/09/25 0513          Hold Oral hypoglycemics while patient is in the hospital.

## 2025-06-14 LAB
ABSOLUTE EOSINOPHIL (OHS): 0.16 K/UL
ABSOLUTE MONOCYTE (OHS): 1.08 K/UL (ref 0.3–1)
ABSOLUTE NEUTROPHIL COUNT (OHS): 11.32 K/UL (ref 1.8–7.7)
ALBUMIN SERPL BCP-MCNC: 3.5 G/DL (ref 3.5–5.2)
ALP SERPL-CCNC: 127 UNIT/L (ref 40–150)
ALT SERPL W/O P-5'-P-CCNC: 19 UNIT/L (ref 10–44)
ANION GAP (OHS): 9 MMOL/L (ref 8–16)
AST SERPL-CCNC: 18 UNIT/L (ref 11–45)
BACTERIA BLD CULT: NORMAL
BACTERIA BLD CULT: NORMAL
BASOPHILS # BLD AUTO: 0.12 K/UL
BASOPHILS NFR BLD AUTO: 0.8 %
BILIRUB SERPL-MCNC: 1.5 MG/DL (ref 0.1–1)
BUN SERPL-MCNC: 15 MG/DL (ref 8–23)
CALCIUM SERPL-MCNC: 9.3 MG/DL (ref 8.7–10.5)
CHLORIDE SERPL-SCNC: 105 MMOL/L (ref 95–110)
CO2 SERPL-SCNC: 26 MMOL/L (ref 23–29)
CREAT SERPL-MCNC: 0.7 MG/DL (ref 0.5–1.4)
ERYTHROCYTE [DISTWIDTH] IN BLOOD BY AUTOMATED COUNT: 14.6 % (ref 11.5–14.5)
GFR SERPLBLD CREATININE-BSD FMLA CKD-EPI: >60 ML/MIN/1.73/M2
GLUCOSE SERPL-MCNC: 245 MG/DL (ref 70–110)
HCT VFR BLD AUTO: 44.2 % (ref 37–48.5)
HGB BLD-MCNC: 14.6 GM/DL (ref 12–16)
IMM GRANULOCYTES # BLD AUTO: 0.1 K/UL (ref 0–0.04)
IMM GRANULOCYTES NFR BLD AUTO: 0.6 % (ref 0–0.5)
INR PPP: 3.4 (ref 0.8–1.2)
LYMPHOCYTES # BLD AUTO: 2.75 K/UL (ref 1–4.8)
MCH RBC QN AUTO: 31 PG (ref 27–31)
MCHC RBC AUTO-ENTMCNC: 33 G/DL (ref 32–36)
MCV RBC AUTO: 94 FL (ref 82–98)
NUCLEATED RBC (/100WBC) (OHS): 0 /100 WBC
OHS QRS DURATION: 82 MS
OHS QTC CALCULATION: 428 MS
PLATELET # BLD AUTO: 204 K/UL (ref 150–450)
PMV BLD AUTO: 10.9 FL (ref 9.2–12.9)
POCT GLUCOSE: 231 MG/DL (ref 70–110)
POCT GLUCOSE: 259 MG/DL (ref 70–110)
POCT GLUCOSE: 265 MG/DL (ref 70–110)
POCT GLUCOSE: 277 MG/DL (ref 70–110)
POCT GLUCOSE: 313 MG/DL (ref 70–110)
POTASSIUM SERPL-SCNC: 3.9 MMOL/L (ref 3.5–5.1)
PROT SERPL-MCNC: 7.2 GM/DL (ref 6–8.4)
PROTHROMBIN TIME: 34.5 SECONDS (ref 9–12.5)
RBC # BLD AUTO: 4.71 M/UL (ref 4–5.4)
RELATIVE EOSINOPHIL (OHS): 1 %
RELATIVE LYMPHOCYTE (OHS): 17.7 % (ref 18–48)
RELATIVE MONOCYTE (OHS): 7 % (ref 4–15)
RELATIVE NEUTROPHIL (OHS): 72.9 % (ref 38–73)
SODIUM SERPL-SCNC: 140 MMOL/L (ref 136–145)
WBC # BLD AUTO: 15.53 K/UL (ref 3.9–12.7)

## 2025-06-14 PROCEDURE — 93010 ELECTROCARDIOGRAM REPORT: CPT | Mod: ,,, | Performed by: INTERNAL MEDICINE

## 2025-06-14 PROCEDURE — 25000003 PHARM REV CODE 250

## 2025-06-14 PROCEDURE — 36415 COLL VENOUS BLD VENIPUNCTURE: CPT

## 2025-06-14 PROCEDURE — 25000003 PHARM REV CODE 250: Performed by: INTERNAL MEDICINE

## 2025-06-14 PROCEDURE — 85610 PROTHROMBIN TIME: CPT | Performed by: STUDENT IN AN ORGANIZED HEALTH CARE EDUCATION/TRAINING PROGRAM

## 2025-06-14 PROCEDURE — 36415 COLL VENOUS BLD VENIPUNCTURE: CPT | Performed by: STUDENT IN AN ORGANIZED HEALTH CARE EDUCATION/TRAINING PROGRAM

## 2025-06-14 PROCEDURE — 80053 COMPREHEN METABOLIC PANEL: CPT

## 2025-06-14 PROCEDURE — 94640 AIRWAY INHALATION TREATMENT: CPT

## 2025-06-14 PROCEDURE — 93005 ELECTROCARDIOGRAM TRACING: CPT

## 2025-06-14 PROCEDURE — 27000207 HC ISOLATION

## 2025-06-14 PROCEDURE — 11000001 HC ACUTE MED/SURG PRIVATE ROOM

## 2025-06-14 PROCEDURE — S4991 NICOTINE PATCH NONLEGEND: HCPCS | Performed by: STUDENT IN AN ORGANIZED HEALTH CARE EDUCATION/TRAINING PROGRAM

## 2025-06-14 PROCEDURE — 25000003 PHARM REV CODE 250: Performed by: STUDENT IN AN ORGANIZED HEALTH CARE EDUCATION/TRAINING PROGRAM

## 2025-06-14 PROCEDURE — 85025 COMPLETE CBC W/AUTO DIFF WBC: CPT

## 2025-06-14 RX ORDER — LEVOFLOXACIN 750 MG/1
750 TABLET, FILM COATED ORAL DAILY
Status: DISCONTINUED | OUTPATIENT
Start: 2025-06-14 | End: 2025-06-16 | Stop reason: HOSPADM

## 2025-06-14 RX ADMIN — INSULIN GLARGINE 7 UNITS: 100 INJECTION, SOLUTION SUBCUTANEOUS at 08:06

## 2025-06-14 RX ADMIN — ATORVASTATIN CALCIUM 80 MG: 40 TABLET, FILM COATED ORAL at 08:06

## 2025-06-14 RX ADMIN — ASPIRIN 81 MG: 81 TABLET, COATED ORAL at 08:06

## 2025-06-14 RX ADMIN — FLUTICASONE FUROATE AND VILANTEROL TRIFENATATE 1 PUFF: 100; 25 POWDER RESPIRATORY (INHALATION) at 08:06

## 2025-06-14 RX ADMIN — LEVOFLOXACIN 750 MG: 750 TABLET, FILM COATED ORAL at 10:06

## 2025-06-14 RX ADMIN — INSULIN ASPART 4 UNITS: 100 INJECTION, SOLUTION INTRAVENOUS; SUBCUTANEOUS at 05:06

## 2025-06-14 RX ADMIN — VANCOMYCIN HYDROCHLORIDE 125 MG: KIT at 06:06

## 2025-06-14 RX ADMIN — NICOTINE 1 PATCH: 14 PATCH, EXTENDED RELEASE TRANSDERMAL at 08:06

## 2025-06-14 RX ADMIN — VANCOMYCIN HYDROCHLORIDE 125 MG: KIT at 11:06

## 2025-06-14 RX ADMIN — INSULIN ASPART 3 UNITS: 100 INJECTION, SOLUTION INTRAVENOUS; SUBCUTANEOUS at 12:06

## 2025-06-14 RX ADMIN — VANCOMYCIN HYDROCHLORIDE 125 MG: KIT at 05:06

## 2025-06-14 RX ADMIN — INSULIN ASPART 1 UNITS: 100 INJECTION, SOLUTION INTRAVENOUS; SUBCUTANEOUS at 09:06

## 2025-06-14 RX ADMIN — INSULIN ASPART 2 UNITS: 100 INJECTION, SOLUTION INTRAVENOUS; SUBCUTANEOUS at 08:06

## 2025-06-14 NOTE — NURSING
Dinner glucose 313. MARTA. MD Van  notified. Per MD patient takes close to 40 units of Tresiba at home and its possible her Lantus is being under dosed, but she's looking for an appropriate conversion. No orders given at this time, plan of care ongoing.

## 2025-06-14 NOTE — ASSESSMENT & PLAN NOTE
Patient's blood pressure range in the last 24 hours was: BP  Min: 111/58  Max: 144/62.The patient's inpatient anti-hypertensive regimen is listed below:  Current Antihypertensives       Plan  - BP is controlled, no changes needed to their regimen

## 2025-06-14 NOTE — ASSESSMENT & PLAN NOTE
Last A1c reviewed-   Lab Results   Component Value Date    HGBA1C 8.3 (H) 06/09/2025     Most recent fingerstick glucose reviewed-   Recent Labs   Lab 06/13/25  0622 06/13/25  1036 06/13/25  1635   POCTGLUCOSE 289* 312* 319*     Current correctional scale  Low  Repeat A1c  Consider adding SGLT2i  Maintain anti-hyperglycemic dose as follows-   Antihyperglycemics (From admission, onward)      Start     Stop Route Frequency Ordered    06/13/25 1215  insulin glargine U-100 (Lantus) pen 7 Units         -- SubQ Daily 06/13/25 1212    06/09/25 0607  insulin aspart U-100 pen 0-5 Units         -- SubQ Before meals & nightly PRN 06/09/25 0513          Hold Oral hypoglycemics while patient is in the hospital.

## 2025-06-14 NOTE — PLAN OF CARE
Pt maintained free from falls/trauma/injuries. Pt denied pain or discomfort. Plan of care reviewed. Pt verbalized understanding. All questions and concerns addressed. VSS with call light and belongings within reach.    Problem: Adult Inpatient Plan of Care  Goal: Plan of Care Review  Outcome: Progressing  Goal: Patient-Specific Goal (Individualized)  Outcome: Progressing     Problem: COPD (Chronic Obstructive Pulmonary Disease)  Goal: Optimal Chronic Illness Coping  Outcome: Progressing  Goal: Optimal Level of Functional [place]  Outcome: Progressing     Problem: Diabetes Comorbidity  Goal: Blood Glucose Level Within Targeted Range  Outcome: Progressing     Problem: Fall Injury Risk  Goal: Absence of Fall and Fall-Related Injury  Outcome: Progressing

## 2025-06-14 NOTE — PROGRESS NOTES
"[facility] - Cardiology [facility] Medicine  Progress Note    Patient Name: Karen Mohan  MRN: [id number]  Patient Class: IP- Inpatient   Admission Date: 6/9/2025  Length of Stay: 1 days  Attending Physician: Diane Araujo MD  Primary Care Provider: Ghazala Valencia MD        Subjective     Principal Problem:Supratherapeutic INR        HPI:  Karen Mohan is a 69-year-old female with a PMHx of CAD s/p MI with LV thrombus on coumadin, HLD, HTN, DM2, tobacco use, emphysema presenting today for small volume hemoptysis. She coughed earlier and had dark red blood mixed with sputum. This happened 2 additional times. She also noticed that her left eye looked bloodshot earlier today prior to coughing. No preceding productive cough, fever/chills, sick contacts. She is on Coumadin, last INR check was 1 month ago. She is a current smoker, denies chest pain or shortness of breath. No leg swelling. No recent fevers or chills. Denies epistaxis.     H/H elevated in the setting of chronic emphysema. INR supratherapeutic >10. No recent trauma, melena, or hematuria.    Overview/Hospital Course:  Patient presented with possible hemoptysis or sneezing up blood associated with a blood shot left eye, poor historian and varying stories.  She was found to have supratherapeutic INR greater than 10.  Admitted and received oral and IV vitamin K with correction of INR. She has leukocytosis, however normal procalcitonin.  She was initially started on doxycycline on presentation given the "hemoptysis" and emphysema noted on CT and Did receive a 1 time dose of prednisone. She was Found to be cdiff antigen and toxin positive, with dark stool, hb stable, spoke with GI, they recommend treating cdiff for now. Cdiff may account for leukocytosis.    Interval History:      Still mild hempomysis. INR supratherapeutic. Starting CAP antibiotics    Review of Systems   Unable to perform ROS: Other     Objective:     Vital Signs (Most " Recent):  Temp: 98.6 °F (37 °C) (06/13/25 2210)  Pulse: (!) 118 (06/13/25 2302)  Resp: 18 (06/13/25 2210)  BP: 131/68 (06/13/25 2210)  SpO2: 97 % (06/13/25 2210) Vital Signs (24h Range):  Temp:  [98 °F (36.7 °C)-98.6 °F (37 °C)] 98.6 °F (37 °C)  Pulse:  [] 118  Resp:  [16-18] 18  SpO2:  [95 %-97 %] 97 %  BP: (111-144)/(58-68) 131/68     Weight: 63.4 kg (139 lb 12.4 oz)  Body mass index is 21.89 kg/m².    Intake/Output Summary (Last 24 hours) at 6/13/2025 2319  Last data filed at 6/13/2025 1343  Gross per 24 hour   Intake 342 ml   Output --   Net 342 ml           Physical Exam  Constitutional:       General: She is not in acute distress.  HENT:      Head: Normocephalic.      Right Ear: External ear normal.      Left Ear: External ear normal.      Nose: Nose normal.   Eyes:      Comments: Blood shot left eye improved   Cardiovascular:      Rate and Rhythm: Normal rate.      Heart sounds: Normal heart sounds.   Pulmonary:      Breath sounds: Normal breath sounds.   Abdominal:      Palpations: Abdomen is soft.      Tenderness: There is no abdominal tenderness.   Skin:     General: Skin is warm.   Neurological:      General: No focal deficit present.      Mental Status: She is alert and oriented to person, place, and time.   Psychiatric:         Mood and Affect: Mood normal.         Thought Content: Thought content normal.              Significant Labs: All pertinent labs within the past 24 hours have been reviewed.  Physical Exam      Assessment & Plan  Supratherapeutic INR  INR >10  S/p vitamin K dose  Repeat INR and trend    6/10  Continue to hold warfarin.  INR this morning still elevated greater than 10, we will give an additional dose of vitamin K IV 5 mg, discussed with pharmacy.  6/11  INR corrected.  1.5 this morning.  We will resume warfarin at a lower dose 5 mg daily, heparin bridge in the interim.  Pharm consult  HTN (hypertension)  Patient's blood pressure range in the last 24 hours was: BP  Min:  111/58  Max: 144/62.The patient's inpatient anti-hypertensive regimen is listed below:  Current Antihypertensives       Plan  - BP is controlled, no changes needed to their regimen  Hyperlipidemia LDL goal <70  Continue lipitor 40  Goal <70 in diabetic, smoker, CAD    Diabetes mellitus, type 2  Last A1c reviewed-   Lab Results   Component Value Date    HGBA1C 8.3 (H) 06/09/2025     Most recent fingerstick glucose reviewed-   Recent Labs   Lab 06/13/25  0622 06/13/25  1036 06/13/25  1635   POCTGLUCOSE 289* 312* 319*     Current correctional scale  Low  Repeat A1c  Consider adding SGLT2i  Maintain anti-hyperglycemic dose as follows-   Antihyperglycemics (From admission, onward)      Start     Stop Route Frequency Ordered    06/13/25 1215  insulin glargine U-100 (Lantus) pen 7 Units         -- SubQ Daily 06/13/25 1212    06/09/25 0607  insulin aspart U-100 pen 0-5 Units         -- SubQ Before meals & nightly PRN 06/09/25 0513          Hold Oral hypoglycemics while patient is in the hospital.  Tobacco abuse  Smoking cessation  PRN nicotine patch    LV (left ventricular) mural thrombus  Hold coumadin today, re-evaluate restarting at lower dose based on INR levels  Continue coumadin for LV thrombus when safe to do so.    History of MI (myocardial infarction)  On aspirin and atorvastatin  GDMT: spionolactone, metoprolol, benazepril  Repeat echo outpatient given last available in 2016  Jardiance tried but had mild nausea/vomiting, try re-initiating    Hemoptysis  Minimal volume hemoptysis in setting of elevated INR  CT chest reviewed with chronic significant emphysema  Treat for COPD exacerbation: received antibiotics in the ED, continue doxycycline  -inhalers added, breathing treatments prn, prednisone 40mg x5 days  -referral to pulmonology outpatient  Smoking cessation    6/11  Different stories, hard to discern if it was true hemoptysis or sneezing up blood.  CT does report findings of COPD.  No evidence of exacerbation.   Continue nebs p.r.n.. D/c Steroids and doxycycline. O/p pulm referral  Clostridium difficile enteritis  Dark loose stools.  C diff antigen and toxin positive.  Hemoglobin is stable, spoke with GI they recommended treated C diff for now.  Leukocytosis also noted, however normal procalcitonin.  Isolation.  Start oral vancomycin, monitor cbc    VTE Risk Mitigation (From admission, onward)           Ordered     Reason for No Pharmacological VTE Prophylaxis  Once        Question:  Reasons:  Answer:  Already adequately anticoagulated on oral Anticoagulants    06/09/25 0513     IP VTE HIGH RISK PATIENT  Once         06/09/25 0513     Place sequential compression device  Until discontinued         06/09/25 0513                    Discharge Planning   JONH: 6/16/2025     Code Status: Full Code   Medical Readiness for Discharge Date:   Discharge Plan A: Home with family, Home Health   Discharge Delays: None known at this time                    Diane Araujo MD  Department of Hospital Medicine   [facility] - Cardiology Stepdown

## 2025-06-14 NOTE — NURSING
Patients heart rate elevated, going as high as 160's. Entered the room to find patient standing up in bathroom just finished having a BM. Instructed patient to come out bathroom and lye in bed informing her of her elevated HR. Patient's HR sustaining in 130's while lying in bed. Patient also reported having mid-sternal chest pain, feeling like she was having a heart attack while in bathroom, and feeling SOB.VS taken and stable, O2 sat 66% on room air(see flowsheet). STAT EKG ordered and ALDO Araujo MD notiified. MD presented to bedside to assess patient. EKG done, confirming sinus tachycardia. Discussed with MD about ordering something  to lower patient's heart rate. Per MD she's not ordering anything rite now since patient is in sinus tachycardia and not A-fib, but she will look and see if she has HF or anything and may order some fluids and an echo. Per MD echocardiogram ordered. No additional orders given at this time, plan of care ongoing.

## 2025-06-14 NOTE — SUBJECTIVE & OBJECTIVE
Interval History:      Still mild hempomysis. INR supratherapeutic. Starting CAP antibiotics    Review of Systems   Unable to perform ROS: Other     Objective:     Vital Signs (Most Recent):  Temp: 98.6 °F (37 °C) (06/13/25 2210)  Pulse: (!) 118 (06/13/25 2302)  Resp: 18 (06/13/25 2210)  BP: 131/68 (06/13/25 2210)  SpO2: 97 % (06/13/25 2210) Vital Signs (24h Range):  Temp:  [98 °F (36.7 °C)-98.6 °F (37 °C)] 98.6 °F (37 °C)  Pulse:  [] 118  Resp:  [16-18] 18  SpO2:  [95 %-97 %] 97 %  BP: (111-144)/(58-68) 131/68     Weight: 63.4 kg (139 lb 12.4 oz)  Body mass index is 21.89 kg/m².    Intake/Output Summary (Last 24 hours) at 6/13/2025 2319  Last data filed at 6/13/2025 1343  Gross per 24 hour   Intake 342 ml   Output --   Net 342 ml           Physical Exam  Constitutional:       General: She is not in acute distress.  HENT:      Head: Normocephalic.      Right Ear: External ear normal.      Left Ear: External ear normal.      Nose: Nose normal.   Eyes:      Comments: Blood shot left eye improved   Cardiovascular:      Rate and Rhythm: Normal rate.      Heart sounds: Normal heart sounds.   Pulmonary:      Breath sounds: Normal breath sounds.   Abdominal:      Palpations: Abdomen is soft.      Tenderness: There is no abdominal tenderness.   Skin:     General: Skin is warm.   Neurological:      General: No focal deficit present.      Mental Status: She is alert and oriented to person, place, and time.   Psychiatric:         Mood and Affect: Mood normal.         Thought Content: Thought content normal.              Significant Labs: All pertinent labs within the past 24 hours have been reviewed.  Physical Exam

## 2025-06-14 NOTE — CARE UPDATE
"RAPID RESPONSE NURSE CHART REVIEW        Chart Reviewed: 06/13/2025, 7:20 PM    MRN: [id number]  Bed: 350/350 A    Dx: Supratherapeutic INR    Karen Mohan has a past medical history of Diabetes mellitus, Heavy tobacco smoker, Hypercholesteremia, Hypertension, Miscarriage, Myocardial infarct, PVD (peripheral vascular disease), and Stroke.    Last VS: /60 (BP Location: Left arm, Patient Position: Lying)   Pulse 105   Temp 98.2 °F (36.8 °C) (Oral)   Resp 18   Ht 5' 7" (1.702 m)   Wt 63.4 kg (139 lb 12.4 oz)   SpO2 95%   Breastfeeding No   BMI 21.89 kg/m²     24H Vital Sign Range:  Temp:  [98 °F (36.7 °C)-98.6 °F (37 °C)]   Pulse:  []   Resp:  [16-18]   BP: (111-144)/(56-68)   SpO2:  [95 %-97 %]     Level of Consciousness (AVPU): alert    Recent Labs     06/11/25  0853 06/12/25  0223 06/13/25  0552   WBC 17.02* 14.24* 14.57*   HGB 15.0 13.9 13.6   HCT 45.3 41.6 40.7    201 209       Recent Labs     06/11/25  0436 06/12/25  0223   * 136   K 3.4* 3.5    106   CO2 21* 21*   BUN 47* 46*   CREATININE 1.1 1.0   * 366*   PHOS 2.4* 3.4   MG 1.8 1.9        No results for input(s): "PH", "PCO2", "PO2", "HCO3", "POCSATURATED", "BE" in the last 72 hours.     OXYGEN:  Flow (L/min) (Oxygen Therapy): 0  Oxygen Concentration (%): 21       MEWS score: 1    Bedside nurseJosiah contacted for  reports with pt now, pt currently using the restroom with HR increasing in past with movement. No additional concerns verbalized at this time. Instructed to call [phone] for further concerns or assistance.    Oumou Hightower RN       "

## 2025-06-14 NOTE — PROGRESS NOTES
Patient heart rate 150s while ambulating in room. Pt asymptomatic. MD Judie Chapman notified, EKG ordered and completed showing sinus tach with a heart rate of 109.

## 2025-06-15 VITALS
HEIGHT: 67 IN | RESPIRATION RATE: 19 BRPM | BODY MASS INDEX: 21.93 KG/M2 | HEART RATE: 86 BPM | DIASTOLIC BLOOD PRESSURE: 69 MMHG | WEIGHT: 139.75 LBS | SYSTOLIC BLOOD PRESSURE: 133 MMHG | OXYGEN SATURATION: 96 % | TEMPERATURE: 98 F

## 2025-06-15 LAB
ABSOLUTE EOSINOPHIL (OHS): 0.15 K/UL
ABSOLUTE MONOCYTE (OHS): 1.1 K/UL (ref 0.3–1)
ABSOLUTE NEUTROPHIL COUNT (OHS): 10.62 K/UL (ref 1.8–7.7)
ALBUMIN SERPL BCP-MCNC: 3.3 G/DL (ref 3.5–5.2)
ALP SERPL-CCNC: 123 UNIT/L (ref 40–150)
ALT SERPL W/O P-5'-P-CCNC: 25 UNIT/L (ref 10–44)
ANION GAP (OHS): 10 MMOL/L (ref 8–16)
AST SERPL-CCNC: 30 UNIT/L (ref 11–45)
BASOPHILS # BLD AUTO: 0.1 K/UL
BASOPHILS NFR BLD AUTO: 0.7 %
BILIRUB SERPL-MCNC: 1.5 MG/DL (ref 0.1–1)
BUN SERPL-MCNC: 11 MG/DL (ref 8–23)
CALCIUM SERPL-MCNC: 9.3 MG/DL (ref 8.7–10.5)
CHLORIDE SERPL-SCNC: 104 MMOL/L (ref 95–110)
CO2 SERPL-SCNC: 25 MMOL/L (ref 23–29)
CREAT SERPL-MCNC: 0.7 MG/DL (ref 0.5–1.4)
ERYTHROCYTE [DISTWIDTH] IN BLOOD BY AUTOMATED COUNT: 14.5 % (ref 11.5–14.5)
GFR SERPLBLD CREATININE-BSD FMLA CKD-EPI: >60 ML/MIN/1.73/M2
GLUCOSE SERPL-MCNC: 219 MG/DL (ref 70–110)
HCT VFR BLD AUTO: 42.8 % (ref 37–48.5)
HGB BLD-MCNC: 14.1 GM/DL (ref 12–16)
IMM GRANULOCYTES # BLD AUTO: 0.09 K/UL (ref 0–0.04)
IMM GRANULOCYTES NFR BLD AUTO: 0.6 % (ref 0–0.5)
INR PPP: 2.5 (ref 0.8–1.2)
LYMPHOCYTES # BLD AUTO: 2.29 K/UL (ref 1–4.8)
MAGNESIUM SERPL-MCNC: 1.7 MG/DL (ref 1.6–2.6)
MCH RBC QN AUTO: 31.2 PG (ref 27–31)
MCHC RBC AUTO-ENTMCNC: 32.9 G/DL (ref 32–36)
MCV RBC AUTO: 95 FL (ref 82–98)
NUCLEATED RBC (/100WBC) (OHS): 0 /100 WBC
OHS QRS DURATION: 80 MS
OHS QTC CALCULATION: 463 MS
PHOSPHATE SERPL-MCNC: 2.4 MG/DL (ref 2.7–4.5)
PLATELET # BLD AUTO: 204 K/UL (ref 150–450)
PMV BLD AUTO: 11.9 FL (ref 9.2–12.9)
POCT GLUCOSE: 234 MG/DL (ref 70–110)
POCT GLUCOSE: 262 MG/DL (ref 70–110)
POCT GLUCOSE: 271 MG/DL (ref 70–110)
POTASSIUM SERPL-SCNC: 4.4 MMOL/L (ref 3.5–5.1)
PROT SERPL-MCNC: 7 GM/DL (ref 6–8.4)
PROTHROMBIN TIME: 25.6 SECONDS (ref 9–12.5)
RBC # BLD AUTO: 4.52 M/UL (ref 4–5.4)
RELATIVE EOSINOPHIL (OHS): 1 %
RELATIVE LYMPHOCYTE (OHS): 16 % (ref 18–48)
RELATIVE MONOCYTE (OHS): 7.7 % (ref 4–15)
RELATIVE NEUTROPHIL (OHS): 74 % (ref 38–73)
SODIUM SERPL-SCNC: 139 MMOL/L (ref 136–145)
WBC # BLD AUTO: 14.35 K/UL (ref 3.9–12.7)

## 2025-06-15 PROCEDURE — 36415 COLL VENOUS BLD VENIPUNCTURE: CPT

## 2025-06-15 PROCEDURE — 97161 PT EVAL LOW COMPLEX 20 MIN: CPT

## 2025-06-15 PROCEDURE — 84100 ASSAY OF PHOSPHORUS: CPT

## 2025-06-15 PROCEDURE — 25000003 PHARM REV CODE 250

## 2025-06-15 PROCEDURE — 85610 PROTHROMBIN TIME: CPT | Performed by: STUDENT IN AN ORGANIZED HEALTH CARE EDUCATION/TRAINING PROGRAM

## 2025-06-15 PROCEDURE — 25000003 PHARM REV CODE 250: Performed by: INTERNAL MEDICINE

## 2025-06-15 PROCEDURE — 80053 COMPREHEN METABOLIC PANEL: CPT

## 2025-06-15 PROCEDURE — S4991 NICOTINE PATCH NONLEGEND: HCPCS | Performed by: STUDENT IN AN ORGANIZED HEALTH CARE EDUCATION/TRAINING PROGRAM

## 2025-06-15 PROCEDURE — 83735 ASSAY OF MAGNESIUM: CPT

## 2025-06-15 PROCEDURE — 85025 COMPLETE CBC W/AUTO DIFF WBC: CPT

## 2025-06-15 PROCEDURE — 25000003 PHARM REV CODE 250: Performed by: STUDENT IN AN ORGANIZED HEALTH CARE EDUCATION/TRAINING PROGRAM

## 2025-06-15 PROCEDURE — 63600175 PHARM REV CODE 636 W HCPCS

## 2025-06-15 RX ORDER — METOPROLOL SUCCINATE 100 MG/1
100 TABLET, EXTENDED RELEASE ORAL DAILY
Status: DISCONTINUED | OUTPATIENT
Start: 2025-06-15 | End: 2025-06-16 | Stop reason: HOSPADM

## 2025-06-15 RX ORDER — INSULIN GLARGINE 100 [IU]/ML
20 INJECTION, SOLUTION SUBCUTANEOUS DAILY
Status: DISCONTINUED | OUTPATIENT
Start: 2025-06-16 | End: 2025-06-16 | Stop reason: HOSPADM

## 2025-06-15 RX ORDER — VANCOMYCIN HYDROCHLORIDE 125 MG/1
125 CAPSULE ORAL 4 TIMES DAILY
Qty: 24 CAPSULE | Refills: 0 | Status: SHIPPED | OUTPATIENT
Start: 2025-06-15 | End: 2025-06-21

## 2025-06-15 RX ORDER — IBUPROFEN 200 MG
1 TABLET ORAL DAILY
Qty: 30 PATCH | Refills: 2 | Status: SHIPPED | OUTPATIENT
Start: 2025-06-15

## 2025-06-15 RX ORDER — METOPROLOL SUCCINATE 100 MG/1
200 TABLET, EXTENDED RELEASE ORAL DAILY
Status: DISCONTINUED | OUTPATIENT
Start: 2025-06-15 | End: 2025-06-15

## 2025-06-15 RX ORDER — INSULIN GLARGINE 100 [IU]/ML
10 INJECTION, SOLUTION SUBCUTANEOUS ONCE
Status: COMPLETED | OUTPATIENT
Start: 2025-06-15 | End: 2025-06-15

## 2025-06-15 RX ORDER — INSULIN DEGLUDEC 200 U/ML
30 INJECTION, SOLUTION SUBCUTANEOUS DAILY
Qty: 9 ML | Refills: 0 | Status: SHIPPED | OUTPATIENT
Start: 2025-06-15 | End: 2025-08-14

## 2025-06-15 RX ORDER — AMLODIPINE AND BENAZEPRIL HYDROCHLORIDE 5; 20 MG/1; MG/1
2 CAPSULE ORAL DAILY
Status: DISCONTINUED | OUTPATIENT
Start: 2025-06-15 | End: 2025-06-15

## 2025-06-15 RX ORDER — WARFARIN 2.5 MG/1
1.25 TABLET ORAL DAILY
Qty: 15 TABLET | Refills: 0 | Status: SHIPPED | OUTPATIENT
Start: 2025-06-15 | End: 2025-07-15

## 2025-06-15 RX ORDER — LEVOFLOXACIN 750 MG/1
750 TABLET, FILM COATED ORAL DAILY
Qty: 3 TABLET | Refills: 0 | Status: SHIPPED | OUTPATIENT
Start: 2025-06-16 | End: 2025-06-19

## 2025-06-15 RX ORDER — MAGNESIUM SULFATE HEPTAHYDRATE 40 MG/ML
2 INJECTION, SOLUTION INTRAVENOUS ONCE
Status: COMPLETED | OUTPATIENT
Start: 2025-06-15 | End: 2025-06-15

## 2025-06-15 RX ORDER — METOPROLOL SUCCINATE 100 MG/1
200 TABLET, EXTENDED RELEASE ORAL DAILY
Qty: 60 TABLET | Refills: 1 | Status: SHIPPED | OUTPATIENT
Start: 2025-06-15 | End: 2025-08-14

## 2025-06-15 RX ADMIN — ATORVASTATIN CALCIUM 80 MG: 40 TABLET, FILM COATED ORAL at 08:06

## 2025-06-15 RX ADMIN — INSULIN ASPART 2 UNITS: 100 INJECTION, SOLUTION INTRAVENOUS; SUBCUTANEOUS at 08:06

## 2025-06-15 RX ADMIN — NICOTINE 1 PATCH: 14 PATCH, EXTENDED RELEASE TRANSDERMAL at 08:06

## 2025-06-15 RX ADMIN — FLUTICASONE FUROATE AND VILANTEROL TRIFENATATE 1 PUFF: 100; 25 POWDER RESPIRATORY (INHALATION) at 08:06

## 2025-06-15 RX ADMIN — LEVOFLOXACIN 750 MG: 750 TABLET, FILM COATED ORAL at 08:06

## 2025-06-15 RX ADMIN — INSULIN ASPART 3 UNITS: 100 INJECTION, SOLUTION INTRAVENOUS; SUBCUTANEOUS at 12:06

## 2025-06-15 RX ADMIN — INSULIN GLARGINE 7 UNITS: 100 INJECTION, SOLUTION SUBCUTANEOUS at 08:06

## 2025-06-15 RX ADMIN — ASPIRIN 81 MG: 81 TABLET, COATED ORAL at 08:06

## 2025-06-15 RX ADMIN — MAGNESIUM SULFATE HEPTAHYDRATE 2 G: 40 INJECTION, SOLUTION INTRAVENOUS at 10:06

## 2025-06-15 RX ADMIN — VANCOMYCIN HYDROCHLORIDE 125 MG: KIT at 06:06

## 2025-06-15 RX ADMIN — VANCOMYCIN HYDROCHLORIDE 125 MG: KIT at 12:06

## 2025-06-15 RX ADMIN — METOPROLOL SUCCINATE 100 MG: 100 TABLET, EXTENDED RELEASE ORAL at 10:06

## 2025-06-15 RX ADMIN — INSULIN ASPART 3 UNITS: 100 INJECTION, SOLUTION INTRAVENOUS; SUBCUTANEOUS at 05:06

## 2025-06-15 RX ADMIN — WARFARIN SODIUM 1.25 MG: 2.5 TABLET ORAL at 05:06

## 2025-06-15 RX ADMIN — INSULIN GLARGINE 10 UNITS: 100 INJECTION, SOLUTION SUBCUTANEOUS at 10:06

## 2025-06-15 NOTE — PLAN OF CARE
Problem: Adult Inpatient Plan of Care  Goal: Plan of Care Review  Outcome: Progressing  Goal: Patient-Specific Goal (Individualized)  Outcome: Progressing  Goal: Absence of Hospital-Acquired Illness or Injury  Outcome: Progressing  Goal: Optimal Comfort and Wellbeing  Outcome: Progressing  Goal: Readiness for Transition of Care  Outcome: Progressing     Problem: COPD (Chronic Obstructive Pulmonary Disease)  Goal: Optimal Chronic Illness Coping  Outcome: Progressing  Goal: Optimal Level of Functional [place]  Outcome: Progressing  Goal: Absence of Infection Signs and Symptoms  Outcome: Progressing  Goal: Improved Oral Intake  Outcome: Progressing  Goal: Effective Oxygenation and Ventilation  Outcome: Progressing     Problem: Diabetes Comorbidity  Goal: Blood Glucose Level Within Targeted Range  Outcome: Progressing     Problem: Fall Injury Risk  Goal: Absence of Fall and Fall-Related Injury  Outcome: Progressing     Problem: Infection  Goal: Absence of Infection Signs and Symptoms  Outcome: Progressing

## 2025-06-15 NOTE — PLAN OF CARE
AAOX4,VSS,O2 sats 98% on room air, patient hyperglycemic with glucose ranging from 215-320.Plan of care discussed with patient, patient being discharged. Patient has no complaints of chest pain/SOB,palpitations, dizziness, lightheadedness, headache, diarrhea, or n/v. Subconjunctival hemorrhage in eye has resolved.Discussed medications and care.Patient being discharged on Lotrel as home medicatio, Coumadin restarted.  Patient has no questions at this time.Pt visualised and stable, with no acute distress noted.Call light within reach.Pt resting,bed at lowest position.Pt's family by the bedside.Will continue to monitor through the shift.       Problem: Adult Inpatient Plan of Care  Goal: Plan of Care Review  Outcome: Met  Goal: Patient-Specific Goal (Individualized)  Outcome: Met  Goal: Absence of Hospital-Acquired Illness or Injury  Outcome: Met  Goal: Optimal Comfort and Wellbeing  Outcome: Met  Goal: Readiness for Transition of Care  Outcome: Met     Problem: COPD (Chronic Obstructive Pulmonary Disease)  Goal: Optimal Chronic Illness Coping  Outcome: Met  Goal: Optimal Level of Functional [place]  Outcome: Progressing  Goal: Absence of Infection Signs and Symptoms  Outcome: Met  Goal: Improved Oral Intake  Outcome: Met  Goal: Effective Oxygenation and Ventilation  Outcome: Met     Problem: Diabetes Comorbidity  Goal: Blood Glucose Level Within Targeted Range  Outcome: Not Progressing     Problem: Fall Injury Risk  Goal: Absence of Fall and Fall-Related Injury  Outcome: Met     Problem: Infection  Goal: Absence of Infection Signs and Symptoms  Outcome: Progressing

## 2025-06-15 NOTE — ASSESSMENT & PLAN NOTE
Last A1c reviewed-   Lab Results   Component Value Date    HGBA1C 8.3 (H) 06/09/2025     Most recent fingerstick glucose reviewed-   Recent Labs   Lab 06/14/25  0640 06/14/25  1202 06/14/25  1642 06/14/25  2111   POCTGLUCOSE 231* 277* 313* 259*     Current correctional scale  Low  Repeat A1c  Consider adding SGLT2i  Maintain anti-hyperglycemic dose as follows-   Antihyperglycemics (From admission, onward)      Start     Stop Route Frequency Ordered    06/13/25 1215  insulin glargine U-100 (Lantus) pen 7 Units         -- SubQ Daily 06/13/25 1212    06/09/25 0607  insulin aspart U-100 pen 0-5 Units         -- SubQ Before meals & nightly PRN 06/09/25 0513          Hold Oral hypoglycemics while patient is in the hospital.

## 2025-06-15 NOTE — PLAN OF CARE
PT eval completed- see note for details, goals and POC established.     Problem: Physical Therapy  Goal: Physical Therapy Goal  Description: Goals to be met by: 7/15/25     Patient will increase functional independence with mobility by performin. Gait  x 150 feet with Supervision using LRAD or no AD.     Outcome: Progressing   6/15/2025

## 2025-06-15 NOTE — PT/OT/SLP EVAL
Physical Therapy Evaluation    Patient Name:  Karen Mohan   MRN:  [id number]    Recommendations:     Discharge Recommendations: No Therapy Indicated   Discharge Equipment Recommendations: none   Barriers to discharge: None    Assessment:     Karen Mohan is a 69 y.o. female admitted with a medical diagnosis of Supratherapeutic INR.  She presents with the following impairments/functional limitations: impaired cardiopulmonary response to activity, weakness, impaired endurance, gait instability     Pt agreeable and tolerated PT evaluation well. Pt able to amb ~20 ft in the room with SBA and no AD this session. Pt demonstrated mild lateral sway when amb but no LOB. Pt encouraged to continue to mobilize with nursing when able.     Rehab Prognosis: Good; patient would benefit from acute skilled PT services to address these deficits and reach maximum level of function.    Recent Surgery: * No surgery found *      Plan:     During this hospitalization, patient to be seen 3 x/week to address the identified rehab impairments via gait training, therapeutic activities, therapeutic exercises, neuromuscular re-education and progress toward the following goals:    Plan of Care Expires:  07/15/25    Subjective     Chief Complaint: none reported  Patient/Family Comments/goals: To go home  Pain/Comfort:  Pain Rating 1: 0/10  Pain Rating Post-Intervention 1: 0/10    Patients cultural, spiritual, Adventist conflicts given the current situation: no    Patient History:     Living Environment: Pt lives with spouse in [facility] with 3 NAEEM and L HR.    Prior Level of Function: primarily IND, occasionally uses rollator for longer distances  DME owned: rollator, RW, shower chair  Caregiver Assistance: spouse    Objective:     Communicated with RN prior to session.  Patient found HOB elevated with telemetry  upon PT entry to room.    General Precautions: Standard, fall  Orthopedic Precautions:N/A   Braces: N/A  Respiratory Status: Room  air    Exams:  Gross Motor Coordination:  WFL  RLE ROM: WFL  RLE Strength: WFL  LLE ROM: WFL  LLE Strength: WFL    Functional Mobility:    Bed Mobility:   Supine > Sit: independence  Sit > Supine: independence    Transfers:   Sit <> Stand Transfer: supervision from EOB without AD    Balance:   Sitting balance: FAIR+: Maintains balance through MINIMAL excursions of active trunk motion  Standing balance:   FAIR: Maintains without assist but unable to take challenges  FAIR+: Needs CLOSE SUPERVISION during gait and is able to right self with minor LOB                 Gait:  Distance: ~20 ft in the room   Assistive Device: no AD  Assistance Level: stand by assistance  Gait Assessment: Pt amb with decreased dimas and mild lateral sway but no LOB      AM-PAC 6 CLICK MOBILITY  Total Score:23       Treatment & Education:  Pt educated on tip to reduce fall risk and safety with mobility and using call button for assistance from nursing staff with OOB mobility.  Pt educated on sitting up in chair throughout most of day  Pt educated on amb 2-3x per day with assistance from staff and increased movement during hospital stay.  All questions answered within the scope of PT.  White board updated accordingly.    Patient left HOB elevated with all lines intact, call button in reach, and spouse present.    GOALS:   Multidisciplinary Problems       Physical Therapy Goals          Problem: Physical Therapy    Goal Priority Disciplines Outcome Interventions   Physical Therapy Goal     PT, PT/OT Progressing    Description: Goals to be met by: 7/15/25     Patient will increase functional independence with mobility by performin. Gait  x 150 feet with Supervision using LRAD or no AD.                          DME Justifications:  No DME recommended requiring DME justifications    History:     Past Medical History:   Diagnosis Date    Diabetes mellitus     Heavy tobacco smoker     Hypercholesteremia     Hypertension     Miscarriage      Myocardial infarct x2    PVD (peripheral vascular disease)     Stroke        Past Surgical History:   Procedure Laterality Date    CARDIAC SURGERY      cardiac stent placement    CORONARY STENT PLACEMENT      TUBAL LIGATION         Time Tracking:     PT Received On: 06/15/25  PT Start Time: 1435     PT Stop Time: 1445  PT Total Time (min): 10 min     Billable Minutes: Evaluation 10      06/15/2025

## 2025-06-15 NOTE — ASSESSMENT & PLAN NOTE
Patient's blood pressure range in the last 24 hours was: BP  Min: 121/60  Max: 152/70.The patient's inpatient anti-hypertensive regimen is listed below:  Current Antihypertensives       Plan  - BP is controlled, no changes needed to their regimen

## 2025-06-15 NOTE — PROGRESS NOTES
"[facility]and Shepard - Cardiology [facility]  Progress Note    Patient Name: Karen Mohan  MRN: [id number]  Patient Class: IP- Inpatient   Admission Date: 6/9/2025  Length of Stay: 3 days  Attending Physician: Diane Araujo MD  Primary Care Provider: Ghazala Valencia MD        Subjective     Principal Problem:Supratherapeutic INR        HPI:  Karen Mohan is a 69-year-old female with a PMHx of CAD s/p MI with LV thrombus on coumadin, HLD, HTN, DM2, tobacco use, emphysema presenting today for small volume hemoptysis. She coughed earlier and had dark red blood mixed with sputum. This happened 2 additional times. She also noticed that her left eye looked bloodshot earlier today prior to coughing. No preceding productive cough, fever/chills, sick contacts. She is on Coumadin, last INR check was 1 month ago. She is a current smoker, denies chest pain or shortness of breath. No leg swelling. No recent fevers or chills. Denies epistaxis.     H/H elevated in the setting of chronic emphysema. INR supratherapeutic >10. No recent trauma, melena, or hematuria.    Overview/Hospital Course:  Patient presented with possible hemoptysis or sneezing up blood associated with a blood shot left eye, poor historian and varying stories.  She was found to have supratherapeutic INR greater than 10.  Admitted and received oral and IV vitamin K with correction of INR. She has leukocytosis, however normal procalcitonin.  She was initially started on doxycycline on presentation given the "hemoptysis" and emphysema noted on CT and Did receive a 1 time dose of prednisone. She was Found to be cdiff antigen and toxin positive, with dark stool, hb stable, spoke with GI, they recommend treating cdiff for now. Cdiff may account for leukocytosis.    Interval History:      Hemoptysis improved. INR supratherapeutic.  Holding warfarin. Starting CAP antibiotics.     On the toilette had episode of sudden SOB and tachycardia. SOB very quickly " resolved once in back in bed. Tachycardia to 120s (sinus tach on EKG) remained prolonged for some time. TTE ordered to evaluate LV thrombus.     Review of Systems   Unable to perform ROS: Other     Objective:     Vital Signs (Most Recent):  Temp: 98.2 °F (36.8 °C) (06/14/25 2315)  Pulse: 96 (06/14/25 2327)  Resp: 18 (06/14/25 2315)  BP: (!) 152/70 (06/14/25 2315)  SpO2: 97 % (06/14/25 2315) Vital Signs (24h Range):  Temp:  [97.5 °F (36.4 °C)-98.7 °F (37.1 °C)] 98.2 °F (36.8 °C)  Pulse:  [] 96  Resp:  [17-18] 18  SpO2:  [94 %-97 %] 97 %  BP: (121-152)/(59-71) 152/70     Weight: 63.4 kg (139 lb 12.4 oz)  Body mass index is 21.89 kg/m².    Intake/Output Summary (Last 24 hours) at 6/15/2025 0015  Last data filed at 6/14/2025 1822  Gross per 24 hour   Intake 444 ml   Output --   Net 444 ml           Physical Exam  Constitutional:       General: She is not in acute distress.  HENT:      Head: Normocephalic.      Right Ear: External ear normal.      Left Ear: External ear normal.      Nose: Nose normal.   Eyes:      Comments: Blood shot left eye improved   Cardiovascular:      Rate and Rhythm: Normal rate.      Heart sounds: Normal heart sounds.   Pulmonary:      Breath sounds: Normal breath sounds.   Abdominal:      Palpations: Abdomen is soft.      Tenderness: There is no abdominal tenderness.   Skin:     General: Skin is warm.   Neurological:      General: No focal deficit present.      Mental Status: She is alert and oriented to person, place, and time.   Psychiatric:         Mood and Affect: Mood normal.         Thought Content: Thought content normal.              Significant Labs: All pertinent labs within the past 24 hours have been reviewed.  Physical Exam      Assessment & Plan  Supratherapeutic INR  INR >10  S/p vitamin K dose  Repeat INR and trend    6/10  Continue to hold warfarin.  INR this morning still elevated greater than 10, we will give an additional dose of vitamin K IV 5 mg, discussed with  pharmacy.  6/11  INR corrected.  1.5 this morning.  We will resume warfarin at a lower dose 5 mg daily, heparin bridge in the interim.  Pharm consult  HTN (hypertension)  Patient's blood pressure range in the last 24 hours was: BP  Min: 121/60  Max: 152/70.The patient's inpatient anti-hypertensive regimen is listed below:  Current Antihypertensives       Plan  - BP is controlled, no changes needed to their regimen  Hyperlipidemia LDL goal <70  Continue lipitor 40  Goal <70 in diabetic, smoker, CAD    Diabetes mellitus, type 2  Last A1c reviewed-   Lab Results   Component Value Date    HGBA1C 8.3 (H) 06/09/2025     Most recent fingerstick glucose reviewed-   Recent Labs   Lab 06/14/25  0640 06/14/25  1202 06/14/25  1642 06/14/25  2111   POCTGLUCOSE 231* 277* 313* 259*     Current correctional scale  Low  Repeat A1c  Consider adding SGLT2i  Maintain anti-hyperglycemic dose as follows-   Antihyperglycemics (From admission, onward)      Start     Stop Route Frequency Ordered    06/13/25 1215  insulin glargine U-100 (Lantus) pen 7 Units         -- SubQ Daily 06/13/25 1212    06/09/25 0607  insulin aspart U-100 pen 0-5 Units         -- SubQ Before meals & nightly PRN 06/09/25 0513          Hold Oral hypoglycemics while patient is in the hospital.  Tobacco abuse  Smoking cessation  PRN nicotine patch    LV (left ventricular) mural thrombus  Hold coumadin today, re-evaluate restarting at lower dose based on INR levels  Continue coumadin for LV thrombus when safe to do so.    History of MI (myocardial infarction)  On aspirin and atorvastatin  GDMT: spionolactone, metoprolol, benazepril  Repeat echo outpatient given last available in 2016  Jardiance tried but had mild nausea/vomiting, try re-initiating    Hemoptysis  Minimal volume hemoptysis in setting of elevated INR  CT chest reviewed with chronic significant emphysema  Treat for COPD exacerbation: received antibiotics in the ED, continue doxycycline  -inhalers added,  breathing treatments prn, prednisone 40mg x5 days  -referral to pulmonology outpatient  Smoking cessation    6/11  Different stories, hard to discern if it was true hemoptysis or sneezing up blood.  CT does report findings of COPD.  No evidence of exacerbation.  Continue nebs p.r.n.. D/c Steroids and doxycycline. O/p pulm referral  Clostridium difficile enteritis  Dark loose stools.  C diff antigen and toxin positive.  Hemoglobin is stable, spoke with GI they recommended treated C diff for now.  Leukocytosis also noted, however normal procalcitonin.  Isolation.  Start oral vancomycin, monitor cbc    VTE Risk Mitigation (From admission, onward)           Ordered     Reason for No Pharmacological VTE Prophylaxis  Once        Question:  Reasons:  Answer:  Already adequately anticoagulated on oral Anticoagulants    06/09/25 0513     IP VTE HIGH RISK PATIENT  Once         06/09/25 0513     Place sequential compression device  Until discontinued         06/09/25 0513                    Discharge Planning   JONH: 6/16/2025     Code Status: Full Code   Medical Readiness for Discharge Date:   Discharge Plan A: Home with family, Home Health   Discharge Delays: None known at this time                    Diane Araujo MD  Department of Hospital Medicine   [facility] - Cardiology Stepdown

## 2025-06-15 NOTE — NURSING
Patient's glucose 271.3 units of Novolog insulin administered per sliding scale. E MD Van notified before discharging patient. No orders given, plan of care ongoing.

## 2025-06-15 NOTE — SUBJECTIVE & OBJECTIVE
Interval History:      Hemoptysis improved. INR supratherapeutic.  Holding warfarin. Starting CAP antibiotics.     On the toilette had episode of sudden SOB and tachycardia. SOB very quickly resolved once in back in bed. Tachycardia to 120s (sinus tach on EKG) remained prolonged for some time. TTE ordered to evaluate LV thrombus.     Review of Systems   Unable to perform ROS: Other     Objective:     Vital Signs (Most Recent):  Temp: 98.2 °F (36.8 °C) (06/14/25 2315)  Pulse: 96 (06/14/25 2327)  Resp: 18 (06/14/25 2315)  BP: (!) 152/70 (06/14/25 2315)  SpO2: 97 % (06/14/25 2315) Vital Signs (24h Range):  Temp:  [97.5 °F (36.4 °C)-98.7 °F (37.1 °C)] 98.2 °F (36.8 °C)  Pulse:  [] 96  Resp:  [17-18] 18  SpO2:  [94 %-97 %] 97 %  BP: (121-152)/(59-71) 152/70     Weight: 63.4 kg (139 lb 12.4 oz)  Body mass index is 21.89 kg/m².    Intake/Output Summary (Last 24 hours) at 6/15/2025 0015  Last data filed at 6/14/2025 1822  Gross per 24 hour   Intake 444 ml   Output --   Net 444 ml           Physical Exam  Constitutional:       General: She is not in acute distress.  HENT:      Head: Normocephalic.      Right Ear: External ear normal.      Left Ear: External ear normal.      Nose: Nose normal.   Eyes:      Comments: Blood shot left eye improved   Cardiovascular:      Rate and Rhythm: Normal rate.      Heart sounds: Normal heart sounds.   Pulmonary:      Breath sounds: Normal breath sounds.   Abdominal:      Palpations: Abdomen is soft.      Tenderness: There is no abdominal tenderness.   Skin:     General: Skin is warm.   Neurological:      General: No focal deficit present.      Mental Status: She is alert and oriented to person, place, and time.   Psychiatric:         Mood and Affect: Mood normal.         Thought Content: Thought content normal.              Significant Labs: All pertinent labs within the past 24 hours have been reviewed.  Physical Exam

## 2025-06-16 NOTE — PLAN OF CARE
Future Appointments   Date Time Provider Department Center   6/23/2025  9:40 AM Lara Carter MD [facility] follow up scheduled for patient on 06/23/2025 @ 09:40am.

## 2025-06-16 NOTE — PLAN OF CARE
I contacted the coumadin clinic to get patient scheduled with appointment,  stated provider needed to send a message. A message was sent.  was notified that patient needs a referral placed in so that patient will be scheduled with appointment.

## 2025-06-17 ENCOUNTER — ANTI-COAG VISIT (OUTPATIENT)
Dept: CARDIOLOGY | Facility: CLINIC | Age: 70
End: 2025-06-17

## 2025-06-17 DIAGNOSIS — Z79.01 ANTICOAGULANT LONG-TERM USE: Primary | ICD-10-CM

## 2025-06-17 DIAGNOSIS — I51.3 LV (LEFT VENTRICULAR) MURAL THROMBUS: Primary | ICD-10-CM

## 2025-06-17 DIAGNOSIS — I51.3 LV (LEFT VENTRICULAR) MURAL THROMBUS: ICD-10-CM

## 2025-06-17 NOTE — PROGRESS NOTES
68 y/o F s/p MI with LV thrombus on coumadin, HLD, HTN, DM2, tobacco use.  Pt's last INR check 1 month ago & presented to ED w/ supratherapeutic INR >10. C diff antigen and toxin positive.  Pt previously on doxy for COPD exacerbation.  Of note, pt d/c'd on levaquin + vanco PO.     6/19/25-Of note, pt was taking warfarin 10mg daily prior to admit & has warfarin was reduced to 1.25mg daily on d/c; however, has been taking a higher dose than recommended.  See calendar.

## 2025-06-18 ENCOUNTER — PATIENT MESSAGE (OUTPATIENT)
Dept: CARDIOLOGY | Facility: CLINIC | Age: 70
End: 2025-06-18
Payer: MEDICARE

## 2025-06-19 ENCOUNTER — PATIENT MESSAGE (OUTPATIENT)
Dept: CARDIOLOGY | Facility: CLINIC | Age: 70
End: 2025-06-19
Payer: MEDICARE

## 2025-06-19 NOTE — PROGRESS NOTES
Spoke to patient's , Mr. Garcia, regarding enrollment into the Coumadin Clinic. He verified the patient has a (green) 2.5 mg  warfarin tablet, and a warfarin regimen of 2.5 mg qPM. INR scheduled for 06.20.2025.    Education on diet and when to call the Coumadin Clinic reviewed. Patient will avoid foods with vitamin K, and EtOH. The importance of a consistent diet discussed. Questions and concerns addressed at this time, and he  expressed understanding. Education sheets sent via patient portal to reinforce teaching.     Update- Mr. Garcia advised on a warfarin dose of 1.25 mg for tonight, 6/19.

## 2025-06-19 NOTE — PHYSICIAN QUERY
Please clarify if there is any clinical correlation between Hemoptysis and Coumadin. Are the conditions:   Due to or associated with each other

## 2025-06-20 ENCOUNTER — PATIENT MESSAGE (OUTPATIENT)
Dept: CARDIOLOGY | Facility: CLINIC | Age: 70
End: 2025-06-20

## 2025-06-20 ENCOUNTER — ANTI-COAG VISIT (OUTPATIENT)
Dept: CARDIOLOGY | Facility: CLINIC | Age: 70
End: 2025-06-20
Payer: MEDICARE

## 2025-06-20 ENCOUNTER — LAB VISIT (OUTPATIENT)
Dept: LAB | Facility: HOSPITAL | Age: 70
End: 2025-06-20
Attending: FAMILY MEDICINE
Payer: MEDICARE

## 2025-06-20 DIAGNOSIS — I51.3 LV (LEFT VENTRICULAR) MURAL THROMBUS: ICD-10-CM

## 2025-06-20 DIAGNOSIS — Z79.01 ANTICOAGULANT LONG-TERM USE: ICD-10-CM

## 2025-06-20 DIAGNOSIS — I51.3 LV (LEFT VENTRICULAR) MURAL THROMBUS: Primary | ICD-10-CM

## 2025-06-20 LAB
INR PPP: 1.6 (ref 0.8–1.2)
PROTHROMBIN TIME: 16.7 SECONDS (ref 9–12.5)

## 2025-06-20 PROCEDURE — 85610 PROTHROMBIN TIME: CPT

## 2025-06-20 PROCEDURE — 36415 COLL VENOUS BLD VENIPUNCTURE: CPT

## 2025-06-20 NOTE — PROGRESS NOTES
[facility]    2025 1:54 PM    Assessment/Plan:    Patient presents today with subtherapeutic  INR.    Assessment of patient findings and chart review: Reviewed    Recommendation for patient's warfarin regimen: Increase per calendar     Recommend repeat INR Monday   _________________________________________________________________    Karen Mohan (69 y.o.) is followed by the [facility].    Anticoagulation Summary  As of 2025      INR goal:  2.0-3.0   TTR:  --   INR used for dosin.6 (2025)   Warfarin maintenance plan:  No maintenance plan   Plan last modified:  Lea Harris, PharmD (2025)   Next INR check:  2025   Target end date:  --    Indications    LV (left ventricular) mural thrombus [I51.3]                 Anticoagulation Episode Summary       INR check location:  Clinic Lab    Preferred lab:  --    Send INR reminders to:  [facility] COUMADIN MONITORING POOL    Comments:  [facility] IM/[facility] (Fri)          Anticoagulation Care Providers       Provider Role Specialty Phone number    Lara Carter MD [facility] Internal Medicine [phone]

## 2025-06-23 ENCOUNTER — ANTI-COAG VISIT (OUTPATIENT)
Dept: CARDIOLOGY | Facility: CLINIC | Age: 70
End: 2025-06-23

## 2025-06-23 ENCOUNTER — OFFICE VISIT (OUTPATIENT)
Dept: INTERNAL MEDICINE | Facility: CLINIC | Age: 70
End: 2025-06-23
Payer: MEDICARE

## 2025-06-23 ENCOUNTER — LAB VISIT (OUTPATIENT)
Dept: LAB | Facility: HOSPITAL | Age: 70
End: 2025-06-23
Payer: MEDICARE

## 2025-06-23 VITALS
HEART RATE: 74 BPM | WEIGHT: 145.31 LBS | HEIGHT: 67 IN | BODY MASS INDEX: 22.81 KG/M2 | SYSTOLIC BLOOD PRESSURE: 118 MMHG | OXYGEN SATURATION: 97 % | DIASTOLIC BLOOD PRESSURE: 62 MMHG

## 2025-06-23 DIAGNOSIS — I51.3 LV (LEFT VENTRICULAR) MURAL THROMBUS: Primary | ICD-10-CM

## 2025-06-23 DIAGNOSIS — A04.72 CLOSTRIDIUM DIFFICILE ENTERITIS: ICD-10-CM

## 2025-06-23 DIAGNOSIS — J44.9 CHRONIC OBSTRUCTIVE PULMONARY DISEASE, UNSPECIFIED COPD TYPE: ICD-10-CM

## 2025-06-23 DIAGNOSIS — E11.51 TYPE 2 DIABETES MELLITUS WITH DIABETIC PERIPHERAL ANGIOPATHY WITHOUT GANGRENE, WITHOUT LONG-TERM CURRENT USE OF INSULIN: ICD-10-CM

## 2025-06-23 DIAGNOSIS — Z79.01 ANTICOAGULANT LONG-TERM USE: ICD-10-CM

## 2025-06-23 DIAGNOSIS — Z86.73 H/O: CVA (CEREBROVASCULAR ACCIDENT): Chronic | ICD-10-CM

## 2025-06-23 DIAGNOSIS — Z09 HOSPITAL DISCHARGE FOLLOW-UP: Primary | ICD-10-CM

## 2025-06-23 DIAGNOSIS — I51.3 LV (LEFT VENTRICULAR) MURAL THROMBUS: ICD-10-CM

## 2025-06-23 DIAGNOSIS — E78.5 HYPERLIPIDEMIA LDL GOAL <70: ICD-10-CM

## 2025-06-23 LAB
INR PPP: 1.6 (ref 0.8–1.2)
PROTHROMBIN TIME: 16.7 SECONDS (ref 9–12.5)

## 2025-06-23 PROCEDURE — 1101F PT FALLS ASSESS-DOCD LE1/YR: CPT | Mod: CPTII,S$GLB,, | Performed by: INTERNAL MEDICINE

## 2025-06-23 PROCEDURE — 99214 OFFICE O/P EST MOD 30 MIN: CPT | Mod: S$GLB,,, | Performed by: INTERNAL MEDICINE

## 2025-06-23 PROCEDURE — 1111F DSCHRG MED/CURRENT MED MERGE: CPT | Mod: CPTII,S$GLB,, | Performed by: INTERNAL MEDICINE

## 2025-06-23 PROCEDURE — 3074F SYST BP LT 130 MM HG: CPT | Mod: CPTII,S$GLB,, | Performed by: INTERNAL MEDICINE

## 2025-06-23 PROCEDURE — 3078F DIAST BP <80 MM HG: CPT | Mod: CPTII,S$GLB,, | Performed by: INTERNAL MEDICINE

## 2025-06-23 PROCEDURE — 36415 COLL VENOUS BLD VENIPUNCTURE: CPT

## 2025-06-23 PROCEDURE — 99999 PR PBB SHADOW E&M-EST. PATIENT-LVL IV: CPT | Mod: PBBFAC,,, | Performed by: INTERNAL MEDICINE

## 2025-06-23 PROCEDURE — 3008F BODY MASS INDEX DOCD: CPT | Mod: CPTII,S$GLB,, | Performed by: INTERNAL MEDICINE

## 2025-06-23 PROCEDURE — 3288F FALL RISK ASSESSMENT DOCD: CPT | Mod: CPTII,S$GLB,, | Performed by: INTERNAL MEDICINE

## 2025-06-23 PROCEDURE — 4010F ACE/ARB THERAPY RXD/TAKEN: CPT | Mod: CPTII,S$GLB,, | Performed by: INTERNAL MEDICINE

## 2025-06-23 PROCEDURE — 3052F HG A1C>EQUAL 8.0%<EQUAL 9.0%: CPT | Mod: CPTII,S$GLB,, | Performed by: INTERNAL MEDICINE

## 2025-06-23 PROCEDURE — 85610 PROTHROMBIN TIME: CPT

## 2025-06-23 PROCEDURE — 1125F AMNT PAIN NOTED PAIN PRSNT: CPT | Mod: CPTII,S$GLB,, | Performed by: INTERNAL MEDICINE

## 2025-06-23 NOTE — PROGRESS NOTES
[facility]    2025 10:29 AM    Assessment/Plan:    Patient presents today with subtherapeutic  INR.    Assessment of patient findings and chart review: Reviewed    Recommendation for patient's warfarin regimen: Increase per calendar    Recommend repeat INR Thursday  _________________________________________________________________    Karen Mohan (69 y.o.) is followed by the [facility].    Anticoagulation Summary  As of 2025      INR goal:  2.0-3.0   TTR:  --   INR used for dosin.6 (2025)   Warfarin maintenance plan:  No maintenance plan   Plan last modified:  Lea Harris, PharmD (2025)   Next INR check:  2025   Target end date:  --    Indications    LV (left ventricular) mural thrombus [I51.3]                 Anticoagulation Episode Summary       INR check location:  Clinic Lab    Preferred lab:  --    Send INR reminders to:  [facility] COUMADIN MONITORING POOL    Comments:  [facility] IM/[facility] (Fri)          Anticoagulation Care Providers       Provider Role Specialty Phone number    Lara Carter MD [facility] Internal Medicine [phone]

## 2025-06-23 NOTE — PROGRESS NOTES
"Subjective:       Patient ID: Karen Mohan is a 69 y.o. female.    Chief Complaint: Follow-up      Recently admitted from 6/9 to 6/25 after prsenting with hemopytisis.     Per hospiral discharge summary:    "Patient presented with possible hemoptysis or sneezing up blood associated with a blood shot left eye, poor historian and varying stories. She was found to have supratherapeutic INR greater than 10. Admitted and received oral and IV vitamin K with correction of INR. She has leukocytosis, however normal procalcitonin. She was initially started on doxycycline on presentation given the "hemoptysis" and emphysema noted on CT and Did receive a 1 time dose of prednisone. She was Found to be cdiff antigen and toxin positive, with dark stool, hb stable, spoke with GI, they recommend treating cdiff for now. Cdiff may account for leukocytosis.     Started on oral vanc for C. Diff. Completed course on 6/21. Diarrhea has subsided.     On wararin for LV thrombus took whole tablet last night. Now established with coumadin clinic.    Her PCP is outside [facility]. Ghazala Valencia.      Transitional Care Note    Family and/or Caretaker present at visit?  Yes.  Diagnostic tests reviewed/disposition: No diagnosic tests pending after this hospitalization.  Disease/illness education: YES   Home health/community services discussion/referrals: Patient does not have home health established from hospital visit.  They do not need home health.  If needed, we will set up home health for the patient.   Establishment or re-establishment of referral orders for community resources: needs to establish in coumagin clinic.   Discussion with other health care providers: needs to follow up with PCP and cardiologist.        Review of Systems   Constitutional:  Negative for activity change, appetite change and chills.   HENT:  Negative for ear pain, sinus pressure/congestion and sneezing.    Respiratory:  Negative for cough and shortness of breath.  "   Cardiovascular:  Negative for chest pain, palpitations and leg swelling.   Gastrointestinal:  Negative for abdominal distention, abdominal pain, constipation, diarrhea, nausea and vomiting.   Genitourinary:  Negative for dysuria and hematuria.   Musculoskeletal:  Negative for arthralgias, back pain and myalgias.   Neurological:  Negative for dizziness and headaches.   Psychiatric/Behavioral:  Negative for agitation. The patient is not nervous/anxious.          Objective:      Physical Exam  Constitutional:       Appearance: Normal appearance.   HENT:      Head: Normocephalic.   Cardiovascular:      Rate and Rhythm: Normal rate and regular rhythm.      Pulses: Normal pulses.      Heart sounds: Normal heart sounds.   Pulmonary:      Effort: Pulmonary effort is normal.      Breath sounds: Normal breath sounds.   Abdominal:      General: Abdomen is flat. Bowel sounds are normal.      Palpations: Abdomen is soft.   Musculoskeletal:         General: Normal range of motion.      Cervical back: Normal range of motion and neck supple.   Skin:     General: Skin is warm and dry.   Neurological:      General: No focal deficit present.      Mental Status: She is alert and oriented to person, place, and time.   Psychiatric:         Mood and Affect: Mood normal.         Assessment:       Problem List Items Addressed This Visit          Neuro    H/O: CVA (cerebrovascular accident) (Chronic)       Pulmonary    Chronic obstructive pulmonary disease, unspecified COPD type       Cardiac/Vascular    Hyperlipidemia LDL goal <70    Relevant Orders    CBC Auto Differential (Completed)    Comprehensive Metabolic Panel (Completed)       Endocrine    Type 2 diabetes mellitus with diabetic peripheral angiopathy without gangrene, without long-term current use of insulin    Morbid obesity due to excess calories       Orthopedic    Unstageable pressure ulcer     Other Visit Diagnoses         Hospital discharge follow-up    -  Primary             Plan:       Karen was seen today for follow-up.    Diagnoses and all orders for this visit:    Hospital discharge follow-up  H/O: CVA (cerebrovascular accident)  -     Ambulatory referral/consult to Internal Medicine  Admitted for hemoptysis due to supra therapeutic INR.  She is now enrolled in coumadin clinic.  INR was subtherapeutic today.   Encouraged to scheduled follow up with PCP     Chronic obstructive pulmonary disease, unspecified COPD type  Continue albuterol inhaler.   Smoking cessation    Hyperlipidemia LDL goal <70  -     CBC Auto Differential; Future  -     Comprehensive Metabolic Panel; Future    Type 2 diabetes mellitus with diabetic peripheral angiopathy without gangrene, without long-term current use of insulin  Continue metformin. BG normal.        C. Diff Enteritis:  Completed course of vancomycin. Diarrhea has resolved. Repeat CBC today to ensure resolution of leokocytosis.

## 2025-06-26 ENCOUNTER — ANTI-COAG VISIT (OUTPATIENT)
Dept: CARDIOLOGY | Facility: CLINIC | Age: 70
End: 2025-06-26
Payer: MEDICARE

## 2025-06-26 ENCOUNTER — LAB VISIT (OUTPATIENT)
Dept: LAB | Facility: HOSPITAL | Age: 70
End: 2025-06-26
Payer: MEDICARE

## 2025-06-26 DIAGNOSIS — I51.3 LV (LEFT VENTRICULAR) MURAL THROMBUS: ICD-10-CM

## 2025-06-26 DIAGNOSIS — I51.3 LV (LEFT VENTRICULAR) MURAL THROMBUS: Primary | ICD-10-CM

## 2025-06-26 DIAGNOSIS — Z79.01 ANTICOAGULANT LONG-TERM USE: ICD-10-CM

## 2025-06-26 LAB
INR PPP: 2.4 (ref 0.8–1.2)
PROTHROMBIN TIME: 24.4 SECONDS (ref 9–12.5)

## 2025-06-26 PROCEDURE — 85610 PROTHROMBIN TIME: CPT

## 2025-06-26 PROCEDURE — 93793 ANTICOAG MGMT PT WARFARIN: CPT | Mod: S$GLB,,,

## 2025-06-26 PROCEDURE — 36415 COLL VENOUS BLD VENIPUNCTURE: CPT

## 2025-06-26 RX ORDER — WARFARIN 2.5 MG/1
TABLET ORAL
Qty: 40 TABLET | Refills: 0 | Status: SHIPPED | OUTPATIENT
Start: 2025-06-26 | End: 2025-07-20 | Stop reason: SDUPTHER

## 2025-06-26 NOTE — PROGRESS NOTES
6/26/25   called requesting a prescription for Warfarin 2.5 mg tablets be called in to [facility] ph # [phone], states only has 1 tablet left, had INR drawn today,  wants call back when prescription has been called in

## 2025-06-26 NOTE — PROGRESS NOTES
[facility]    2025 1:12 PM    Assessment/Plan:    Patient presents today with therapeutic INR.    Assessment of patient findings and chart review: Reviewed.  Will increase to an equivalent dose      Recommendation for patient's warfarin regimen: Increase per calendar     Recommend repeat INR Monday  _________________________________________________________________    Karen Mohan (69 y.o.) is followed by the [facility].    Anticoagulation Summary  As of 2025      INR goal:  2.0-3.0   TTR:  --   INR used for dosin.4 (2025)   Warfarin maintenance plan:  No maintenance plan   Plan last modified:  Lea Harris, PharmD (2025)   Next INR check:  2025   Target end date:  --    Indications    LV (left ventricular) mural thrombus [I51.3]                 Anticoagulation Episode Summary       INR check location:  Clinic Lab    Preferred lab:  --    Send INR reminders to:  [facility] COUMADIN MONITORING POOL    Comments:  [facility] (Fri[facility]          Anticoagulation Care Providers       Provider Role Specialty Phone number    Lara Carter MD [facility] Internal Medicine [phone]

## 2025-06-30 ENCOUNTER — ANTI-COAG VISIT (OUTPATIENT)
Dept: CARDIOLOGY | Facility: CLINIC | Age: 70
End: 2025-06-30
Payer: MEDICARE

## 2025-06-30 ENCOUNTER — LAB VISIT (OUTPATIENT)
Dept: LAB | Facility: HOSPITAL | Age: 70
End: 2025-06-30
Payer: MEDICARE

## 2025-06-30 DIAGNOSIS — I51.3 LV (LEFT VENTRICULAR) MURAL THROMBUS: ICD-10-CM

## 2025-06-30 DIAGNOSIS — I51.3 LV (LEFT VENTRICULAR) MURAL THROMBUS: Primary | ICD-10-CM

## 2025-06-30 DIAGNOSIS — Z79.01 ANTICOAGULANT LONG-TERM USE: ICD-10-CM

## 2025-06-30 LAB
INR PPP: 1.7 (ref 0.8–1.2)
PROTHROMBIN TIME: 17.9 SECONDS (ref 9–12.5)

## 2025-06-30 PROCEDURE — 36415 COLL VENOUS BLD VENIPUNCTURE: CPT

## 2025-06-30 PROCEDURE — 85610 PROTHROMBIN TIME: CPT

## 2025-06-30 PROCEDURE — 93793 ANTICOAG MGMT PT WARFARIN: CPT | Mod: S$GLB,,,

## 2025-06-30 NOTE — PROGRESS NOTES
[facility]    2025 3:53 PM    Assessment/Plan:    Patient presents today with subtherapeutic  INR.    Assessment of patient findings and chart review: INR dropped below range.     Recommendation for patient's warfarin regimen: Increase maintenance dose    Recommend repeat INR in 1 week  _________________________________________________________________    Karen SCHMIDT Mohan (69 y.o.) is followed by the [facility].    Anticoagulation Summary  As of 2025      INR goal:  2.0-3.0   TTR:  49.5% (3 d)   INR used for dosin.7 (2025)   Warfarin maintenance plan:  2.5 mg (2.5 mg x 1) every Sun, Tue, u; 3.75 mg (2.5 mg x 1.5) all other days   Weekly warfarin total:  22.5 mg   Plan last modified:  Lea Harris, PharmD (2025)   Next INR check:  2025   Target end date:  --    Indications    LV (left ventricular) mural thrombus [I51.3]                 Anticoagulation Episode Summary       INR check location:  Clinic Lab    Preferred lab:  --    Send INR reminders to:  [facility] COUMADIN MONITORING POOL    Comments:  [facility] IM/[facility] (Fri)          Anticoagulation Care Providers       Provider Role Specialty Phone number    Lara Carter MD [facility] Internal Medicine [phone]

## 2025-07-07 ENCOUNTER — ANTI-COAG VISIT (OUTPATIENT)
Dept: CARDIOLOGY | Facility: CLINIC | Age: 70
End: 2025-07-07
Payer: MEDICARE

## 2025-07-07 ENCOUNTER — LAB VISIT (OUTPATIENT)
Dept: LAB | Facility: HOSPITAL | Age: 70
End: 2025-07-07
Payer: MEDICARE

## 2025-07-07 DIAGNOSIS — I51.3 LV (LEFT VENTRICULAR) MURAL THROMBUS: ICD-10-CM

## 2025-07-07 DIAGNOSIS — I51.3 LV (LEFT VENTRICULAR) MURAL THROMBUS: Primary | ICD-10-CM

## 2025-07-07 DIAGNOSIS — Z79.01 ANTICOAGULANT LONG-TERM USE: ICD-10-CM

## 2025-07-07 LAB
INR PPP: 1.7 (ref 0.8–1.2)
PROTHROMBIN TIME: 18 SECONDS (ref 9–12.5)

## 2025-07-07 PROCEDURE — 36415 COLL VENOUS BLD VENIPUNCTURE: CPT

## 2025-07-07 PROCEDURE — 85610 PROTHROMBIN TIME: CPT

## 2025-07-07 PROCEDURE — 93793 ANTICOAG MGMT PT WARFARIN: CPT | Mod: S$GLB,,,

## 2025-07-07 NOTE — PROGRESS NOTES
[facility]    2025 1:37 PM    Assessment/Plan:    Patient presents today with subtherapeutic  INR.    Assessment of patient findings and chart review: Reviewed    Recommendation for patient's warfarin regimen: Boost dose today to 5mg then increase maintenance dose    Recommend repeat INR Thursday  _________________________________________________________________    Karen SCHMIDT Mohan (69 y.o.) is followed by the [facility].    Anticoagulation Summary  As of 2025      INR goal:  2.0-3.0   TTR:  16.1% (1.4 wk)   INR used for dosin.7 (2025)   Warfarin maintenance plan:  2.5 mg (2.5 mg x 1) every Sun; 3.75 mg (2.5 mg x 1.5) all other days   Weekly warfarin total:  25 mg   Plan last modified:  Lea Harris, PharmD (2025)   Next INR check:  7/10/2025   Target end date:  --    Indications    LV (left ventricular) mural thrombus [I51.3]                 Anticoagulation Episode Summary       INR check location:  Clinic Lab    Preferred lab:  --    Send INR reminders to:  [facility] COUMADIN MONITORING POOL    Comments:  [facility] IM/[facility] (Fri)          Anticoagulation Care Providers       Provider Role Specialty Phone number    Lara Carter MD [facility] Internal Medicine [phone]

## 2025-07-10 ENCOUNTER — ANTI-COAG VISIT (OUTPATIENT)
Dept: CARDIOLOGY | Facility: CLINIC | Age: 70
End: 2025-07-10
Payer: MEDICARE

## 2025-07-10 ENCOUNTER — LAB VISIT (OUTPATIENT)
Dept: LAB | Facility: HOSPITAL | Age: 70
End: 2025-07-10
Payer: MEDICARE

## 2025-07-10 DIAGNOSIS — Z79.01 ANTICOAGULANT LONG-TERM USE: ICD-10-CM

## 2025-07-10 DIAGNOSIS — I51.3 LV (LEFT VENTRICULAR) MURAL THROMBUS: ICD-10-CM

## 2025-07-10 DIAGNOSIS — I51.3 LV (LEFT VENTRICULAR) MURAL THROMBUS: Primary | ICD-10-CM

## 2025-07-10 LAB
INR PPP: 1.8 (ref 0.8–1.2)
PROTHROMBIN TIME: 18.7 SECONDS (ref 9–12.5)

## 2025-07-10 PROCEDURE — 93793 ANTICOAG MGMT PT WARFARIN: CPT | Mod: S$GLB,,,

## 2025-07-10 PROCEDURE — 85610 PROTHROMBIN TIME: CPT

## 2025-07-10 PROCEDURE — 36415 COLL VENOUS BLD VENIPUNCTURE: CPT

## 2025-07-10 NOTE — PROGRESS NOTES
[facility]    07/10/2025 3:07 PM    Assessment/Plan:    Patient presents today with subtherapeutic  INR.    Assessment of patient findings and chart review: Pt denies any changes     Recommendation for patient's warfarin regimen: Increase per calendar     Recommend repeat INR Monday  _________________________________________________________________    Karen ROXANA Mohan (69 y.o.) is followed by the [facility].    Anticoagulation Summary  As of 7/10/2025      INR goal:  2.0-3.0   TTR:  12.5% (1.9 wk)   INR used for dosin.8 (7/10/2025)   Warfarin maintenance plan:  2.5 mg (2.5 mg x 1) every Sun; 3.75 mg (2.5 mg x 1.5) all other days   Weekly warfarin total:  25 mg   Plan last modified:  Lea Harris, PharmD (2025)   Next INR check:  2025   Target end date:  --    Indications    LV (left ventricular) mural thrombus [I51.3]                 Anticoagulation Episode Summary       INR check location:  Clinic Lab    Preferred lab:  --    Send INR reminders to:  [facility] COUMADIN MONITORING POOL    Comments:  [facility] IM/[facility] (Fri)          Anticoagulation Care Providers       Provider Role Specialty Phone number    Lara Carter MD [facility] Internal Medicine [phone]

## 2025-07-14 ENCOUNTER — ANTI-COAG VISIT (OUTPATIENT)
Dept: CARDIOLOGY | Facility: CLINIC | Age: 70
End: 2025-07-14
Payer: MEDICARE

## 2025-07-14 ENCOUNTER — LAB VISIT (OUTPATIENT)
Dept: LAB | Facility: HOSPITAL | Age: 70
End: 2025-07-14
Payer: MEDICARE

## 2025-07-14 DIAGNOSIS — Z79.01 ANTICOAGULANT LONG-TERM USE: ICD-10-CM

## 2025-07-14 DIAGNOSIS — I51.3 LV (LEFT VENTRICULAR) MURAL THROMBUS: Primary | ICD-10-CM

## 2025-07-14 DIAGNOSIS — I51.3 LV (LEFT VENTRICULAR) MURAL THROMBUS: ICD-10-CM

## 2025-07-14 LAB
INR PPP: 2.2 (ref 0.8–1.2)
PROTHROMBIN TIME: 23 SECONDS (ref 9–12.5)

## 2025-07-14 PROCEDURE — 36415 COLL VENOUS BLD VENIPUNCTURE: CPT

## 2025-07-14 PROCEDURE — 85610 PROTHROMBIN TIME: CPT

## 2025-07-14 PROCEDURE — 93793 ANTICOAG MGMT PT WARFARIN: CPT | Mod: S$GLB,,,

## 2025-07-14 NOTE — PROGRESS NOTES
[facility]    2025 3:53 PM    Assessment/Plan:    Patient presents today with therapeutic INR.    Assessment of patient findings and chart review: Reviewed    Recommendation for patient's warfarin regimen: Continue current maintenance dose    Recommend repeat INR Thursday.  Will continue to monitor closely.  _________________________________________________________________    Karen ROXANA Mohan (69 y.o.) is followed by the [facility].    Anticoagulation Summary  As of 2025      INR goal:  2.0-3.0   TTR:  21.1% (2.4 wk)   INR used for dosin.2 (2025)   Warfarin maintenance plan:  5 mg (2.5 mg x 2) every Tue, Sat; 3.75 mg (2.5 mg x 1.5) all other days   Weekly warfarin total:  28.75 mg   Plan last modified:  Lea Harris, PharmD (2025)   Next INR check:  2025   Target end date:  --    Indications    LV (left ventricular) mural thrombus [I51.3]                 Anticoagulation Episode Summary       INR check location:  Clinic Lab    Preferred lab:  --    Send INR reminders to:  [facility] COUMADIN MONITORING POOL    Comments:  [facility] IM/[facility] (Fri)          Anticoagulation Care Providers       Provider Role Specialty Phone number    Lara Carter MD [facility] Internal Medicine [phone]

## 2025-07-17 ENCOUNTER — ANTI-COAG VISIT (OUTPATIENT)
Dept: CARDIOLOGY | Facility: CLINIC | Age: 70
End: 2025-07-17
Payer: MEDICARE

## 2025-07-17 ENCOUNTER — LAB VISIT (OUTPATIENT)
Dept: LAB | Facility: HOSPITAL | Age: 70
End: 2025-07-17
Payer: MEDICARE

## 2025-07-17 DIAGNOSIS — I51.3 LV (LEFT VENTRICULAR) MURAL THROMBUS: ICD-10-CM

## 2025-07-17 DIAGNOSIS — I51.3 LV (LEFT VENTRICULAR) MURAL THROMBUS: Primary | ICD-10-CM

## 2025-07-17 DIAGNOSIS — Z79.01 ANTICOAGULANT LONG-TERM USE: ICD-10-CM

## 2025-07-17 LAB
INR PPP: 2.5 (ref 0.8–1.2)
PROTHROMBIN TIME: 25.6 SECONDS (ref 9–12.5)

## 2025-07-17 PROCEDURE — 93793 ANTICOAG MGMT PT WARFARIN: CPT | Mod: S$GLB,,,

## 2025-07-17 PROCEDURE — 85610 PROTHROMBIN TIME: CPT

## 2025-07-17 PROCEDURE — 36415 COLL VENOUS BLD VENIPUNCTURE: CPT

## 2025-07-17 NOTE — PROGRESS NOTES
[facility]    2025 12:26 PM    Assessment/Plan:    Patient presents today with therapeutic INR.    Assessment of patient findings and chart review: Reviewed    Recommendation for patient's warfarin regimen: Continue current maintenance dose    Recommend repeat INR in 1 week  _________________________________________________________________    Karen Mohan (69 y.o.) is followed by the [facility].    Anticoagulation Summary  As of 2025      INR goal:  2.0-3.0   TTR:  32.6% (2.9 wk)   INR used for dosin.5 (2025)   Warfarin maintenance plan:  5 mg (2.5 mg x 2) every Tue, Sat; 3.75 mg (2.5 mg x 1.5) all other days   Weekly warfarin total:  28.75 mg   Plan last modified:  Lea Harris, PharmD (2025)   Next INR check:  2025   Target end date:  --    Indications    LV (left ventricular) mural thrombus [I51.3]                 Anticoagulation Episode Summary       INR check location:  Clinic Lab    Preferred lab:  --    Send INR reminders to:  [facility] COUMADIN MONITORING POOL    Comments:  [facility] IM/[facility] (Fri)          Anticoagulation Care Providers       Provider Role Specialty Phone number    Lara Carter MD [facility] Internal Medicine [phone]

## 2025-07-20 DIAGNOSIS — I51.3 LV (LEFT VENTRICULAR) MURAL THROMBUS: ICD-10-CM

## 2025-07-20 PROBLEM — E11.51 TYPE 2 DIABETES MELLITUS WITH DIABETIC PERIPHERAL ANGIOPATHY WITHOUT GANGRENE, WITHOUT LONG-TERM CURRENT USE OF INSULIN: Status: ACTIVE | Noted: 2025-07-20

## 2025-07-21 RX ORDER — WARFARIN 2.5 MG/1
TABLET ORAL
Qty: 40 TABLET | Refills: 0 | Status: SHIPPED | OUTPATIENT
Start: 2025-07-21

## 2025-07-24 ENCOUNTER — LAB VISIT (OUTPATIENT)
Dept: LAB | Facility: HOSPITAL | Age: 70
End: 2025-07-24
Payer: MEDICARE

## 2025-07-24 ENCOUNTER — ANTI-COAG VISIT (OUTPATIENT)
Dept: CARDIOLOGY | Facility: CLINIC | Age: 70
End: 2025-07-24
Payer: MEDICARE

## 2025-07-24 DIAGNOSIS — I51.3 LV (LEFT VENTRICULAR) MURAL THROMBUS: Primary | ICD-10-CM

## 2025-07-24 DIAGNOSIS — I51.3 LV (LEFT VENTRICULAR) MURAL THROMBUS: ICD-10-CM

## 2025-07-24 DIAGNOSIS — Z79.01 ANTICOAGULANT LONG-TERM USE: ICD-10-CM

## 2025-07-24 LAB
INR PPP: 3.6 (ref 0.8–1.2)
PROTHROMBIN TIME: 35.6 SECONDS (ref 9–12.5)

## 2025-07-24 PROCEDURE — 93793 ANTICOAG MGMT PT WARFARIN: CPT | Mod: S$GLB,,,

## 2025-07-24 PROCEDURE — 36415 COLL VENOUS BLD VENIPUNCTURE: CPT

## 2025-07-24 PROCEDURE — 85610 PROTHROMBIN TIME: CPT

## 2025-07-24 NOTE — PROGRESS NOTES
[facility]    07/24/2025 12:39 PM    Assessment/Plan:    Patient presents today with supratherapeutic INR.    Assessment of patient findings and chart review: Pt denies any changes     Recommendation for patient's warfarin regimen: Hold dose today then resume current maintenance dose    Recommend repeat INR in 1 week  _________________________________________________________________    Karen ROXANA Mohan (69 y.o.) is followed by the [facility].    Anticoagulation Summary  As of 7/24/2025      INR goal:  2.0-3.0   TTR:  35.9% (3.9 wk)   INR used for dosing:  3.6 (7/24/2025)   Warfarin maintenance plan:  5 mg (2.5 mg x 2) every Tue, Sat; 3.75 mg (2.5 mg x 1.5) all other days   Weekly warfarin total:  28.75 mg   Plan last modified:  Lea Harris, PharmD (7/14/2025)   Next INR check:  7/31/2025   Target end date:  --    Indications    LV (left ventricular) mural thrombus [I51.3]                 Anticoagulation Episode Summary       INR check location:  Clinic Lab    Preferred lab:  --    Send INR reminders to:  [facility] COUMADIN MONITORING POOL    Comments:  [facility] IM/[facility] (Fri)          Anticoagulation Care Providers       Provider Role Specialty Phone number    Lara Carter MD [facility] Internal Medicine [phone]

## 2025-07-24 NOTE — PROGRESS NOTES
The [facility] aims to reduce bleeding risk associated with the concomitant use of anticoagulants and antiplatelet agents in patients without an indication for dual therapy.     We have identified Karen Mohan as a patient on warfarin therapy whose antiplatelet agent may be de-escalated or discontinued, and provider has been notified.

## 2025-07-30 ENCOUNTER — ANTI-COAG VISIT (OUTPATIENT)
Dept: CARDIOLOGY | Facility: CLINIC | Age: 70
End: 2025-07-30
Payer: MEDICARE

## 2025-07-30 ENCOUNTER — LAB VISIT (OUTPATIENT)
Dept: LAB | Facility: HOSPITAL | Age: 70
End: 2025-07-30
Payer: MEDICARE

## 2025-07-30 DIAGNOSIS — I51.3 LV (LEFT VENTRICULAR) MURAL THROMBUS: ICD-10-CM

## 2025-07-30 DIAGNOSIS — Z79.01 ANTICOAGULANT LONG-TERM USE: ICD-10-CM

## 2025-07-30 DIAGNOSIS — I51.3 LV (LEFT VENTRICULAR) MURAL THROMBUS: Primary | ICD-10-CM

## 2025-07-30 LAB
INR PPP: 2.5 (ref 0.8–1.2)
PROTHROMBIN TIME: 25.6 SECONDS (ref 9–12.5)

## 2025-07-30 PROCEDURE — 85610 PROTHROMBIN TIME: CPT

## 2025-07-30 PROCEDURE — 36415 COLL VENOUS BLD VENIPUNCTURE: CPT

## 2025-07-30 PROCEDURE — 93793 ANTICOAG MGMT PT WARFARIN: CPT | Mod: S$GLB,,,

## 2025-07-30 NOTE — PROGRESS NOTES
[facility]    2025 11:12 AM    Assessment/Plan:    Patient presents today with therapeutic INR.    Assessment of patient findings and chart review: Reviewed    Recommendation for patient's warfarin regimen: Continue current maintenance dose    Recommend repeat INR in 1 week  _________________________________________________________________    Karen Mohan (69 y.o.) is followed by the [facility].    Anticoagulation Summary  As of 2025      INR goal:  2.0-3.0   TTR:  37.6% (1.1 mo)   INR used for dosin.5 (2025)   Warfarin maintenance plan:  5 mg (2.5 mg x 2) every Tue, Sat; 3.75 mg (2.5 mg x 1.5) all other days   Weekly warfarin total:  28.75 mg   Plan last modified:  Lea Harris, PharmD (2025)   Next INR check:  2025   Target end date:  --    Indications    LV (left ventricular) mural thrombus [I51.3]                 Anticoagulation Episode Summary       INR check location:  Clinic Lab    Preferred lab:  --    Send INR reminders to:  [facility] COUMADIN MONITORING POOL    Comments:  [facility] IM/[facility] (Fri)          Anticoagulation Care Providers       Provider Role Specialty Phone number    Lara Carter MD [facility] Internal Medicine [phone]

## 2025-08-06 ENCOUNTER — ANTI-COAG VISIT (OUTPATIENT)
Dept: CARDIOLOGY | Facility: CLINIC | Age: 70
End: 2025-08-06
Payer: MEDICARE

## 2025-08-06 ENCOUNTER — LAB VISIT (OUTPATIENT)
Dept: LAB | Facility: HOSPITAL | Age: 70
End: 2025-08-06
Payer: MEDICARE

## 2025-08-06 DIAGNOSIS — I51.3 LV (LEFT VENTRICULAR) MURAL THROMBUS: ICD-10-CM

## 2025-08-06 DIAGNOSIS — Z79.01 ANTICOAGULANT LONG-TERM USE: ICD-10-CM

## 2025-08-06 DIAGNOSIS — I51.3 LV (LEFT VENTRICULAR) MURAL THROMBUS: Primary | ICD-10-CM

## 2025-08-06 LAB
INR PPP: 3 (ref 0.8–1.2)
PROTHROMBIN TIME: 30.4 SECONDS (ref 9–12.5)

## 2025-08-06 PROCEDURE — 93793 ANTICOAG MGMT PT WARFARIN: CPT | Mod: S$GLB,,,

## 2025-08-06 PROCEDURE — 85610 PROTHROMBIN TIME: CPT

## 2025-08-06 PROCEDURE — 36415 COLL VENOUS BLD VENIPUNCTURE: CPT

## 2025-08-06 NOTE — PROGRESS NOTES
[facility]    08/06/2025 11:19 AM    Assessment/Plan:    Patient presents today with therapeutic INR.    Assessment of patient findings and chart review: Reviewed    Recommendation for patient's warfarin regimen: Continue current maintenance dose    Recommend repeat INR in 2 weeks  _________________________________________________________________    Karen Mohan (69 y.o.) is followed by the [facility].    Anticoagulation Summary  As of 8/6/2025      INR goal:  2.0-3.0   TTR:  48.5% (1.3 mo)   INR used for dosing:  3.0 (8/6/2025)   Warfarin maintenance plan:  5 mg (2.5 mg x 2) every Tue, Sat; 3.75 mg (2.5 mg x 1.5) all other days   Weekly warfarin total:  28.75 mg   Plan last modified:  Lea Harris, PharmD (7/14/2025)   Next INR check:  8/20/2025   Target end date:  --    Indications    LV (left ventricular) mural thrombus [I51.3]                 Anticoagulation Episode Summary       INR check location:  Clinic Lab    Preferred lab:  --    Send INR reminders to:  [facility] COUMADIN MONITORING POOL    Comments:  [facility] IM/[facility] (Fri)          Anticoagulation Care Providers       Provider Role Specialty Phone number    Lara Carter MD [facility] Internal Medicine [phone]

## 2025-08-14 RX ORDER — METOPROLOL SUCCINATE 100 MG/1
200 TABLET, EXTENDED RELEASE ORAL DAILY
Qty: 60 TABLET | Refills: 1 | Status: CANCELLED | OUTPATIENT
Start: 2025-08-14 | End: 2025-10-13

## 2025-08-15 RX ORDER — METOPROLOL SUCCINATE 100 MG/1
200 TABLET, EXTENDED RELEASE ORAL DAILY
Qty: 60 TABLET | Refills: 1 | Status: CANCELLED | OUTPATIENT
Start: 2025-08-14 | End: 2025-10-13

## 2025-08-17 DIAGNOSIS — I51.3 LV (LEFT VENTRICULAR) MURAL THROMBUS: ICD-10-CM

## 2025-08-20 ENCOUNTER — LAB VISIT (OUTPATIENT)
Dept: LAB | Facility: HOSPITAL | Age: 70
End: 2025-08-20
Payer: MEDICARE

## 2025-08-20 ENCOUNTER — ANTI-COAG VISIT (OUTPATIENT)
Dept: CARDIOLOGY | Facility: CLINIC | Age: 70
End: 2025-08-20
Payer: MEDICARE

## 2025-08-20 DIAGNOSIS — I51.3 LV (LEFT VENTRICULAR) MURAL THROMBUS: ICD-10-CM

## 2025-08-20 DIAGNOSIS — I51.3 LV (LEFT VENTRICULAR) MURAL THROMBUS: Primary | ICD-10-CM

## 2025-08-20 DIAGNOSIS — Z79.01 ANTICOAGULANT LONG-TERM USE: ICD-10-CM

## 2025-08-20 LAB
INR PPP: 4.1 (ref 0.8–1.2)
PROTHROMBIN TIME: 40.6 SECONDS (ref 9–12.5)

## 2025-08-20 PROCEDURE — 85610 PROTHROMBIN TIME: CPT

## 2025-08-20 PROCEDURE — 93793 ANTICOAG MGMT PT WARFARIN: CPT | Mod: S$GLB,,,

## 2025-08-20 PROCEDURE — 36415 COLL VENOUS BLD VENIPUNCTURE: CPT

## 2025-08-28 ENCOUNTER — ANTI-COAG VISIT (OUTPATIENT)
Dept: CARDIOLOGY | Facility: CLINIC | Age: 70
End: 2025-08-28
Payer: MEDICARE

## 2025-08-28 ENCOUNTER — LAB VISIT (OUTPATIENT)
Dept: LAB | Facility: HOSPITAL | Age: 70
End: 2025-08-28
Payer: MEDICARE

## 2025-08-28 DIAGNOSIS — I51.3 LV (LEFT VENTRICULAR) MURAL THROMBUS: ICD-10-CM

## 2025-08-28 DIAGNOSIS — Z79.01 ANTICOAGULANT LONG-TERM USE: ICD-10-CM

## 2025-08-28 DIAGNOSIS — I51.3 LV (LEFT VENTRICULAR) MURAL THROMBUS: Primary | ICD-10-CM

## 2025-08-28 LAB
INR PPP: 3.2 (ref 0.8–1.2)
PROTHROMBIN TIME: 32.3 SECONDS (ref 9–12.5)

## 2025-08-28 PROCEDURE — 93793 ANTICOAG MGMT PT WARFARIN: CPT | Mod: S$GLB,,,

## 2025-08-28 PROCEDURE — 36415 COLL VENOUS BLD VENIPUNCTURE: CPT

## 2025-08-28 PROCEDURE — 85610 PROTHROMBIN TIME: CPT

## 2025-09-04 RX ORDER — METOPROLOL SUCCINATE 100 MG/1
200 TABLET, EXTENDED RELEASE ORAL DAILY
Qty: 60 TABLET | Refills: 1 | OUTPATIENT
Start: 2025-09-04 | End: 2025-11-03

## 2025-09-04 RX ORDER — WARFARIN 2.5 MG/1
TABLET ORAL
Qty: 40 TABLET | Refills: 0 | OUTPATIENT
Start: 2025-09-04